# Patient Record
Sex: FEMALE | Race: WHITE | NOT HISPANIC OR LATINO | Employment: OTHER | RURAL
[De-identification: names, ages, dates, MRNs, and addresses within clinical notes are randomized per-mention and may not be internally consistent; named-entity substitution may affect disease eponyms.]

---

## 2017-06-16 ENCOUNTER — OFFICE VISIT (OUTPATIENT)
Dept: PODIATRY | Facility: CLINIC | Age: 62
End: 2017-06-16

## 2017-06-16 VITALS — WEIGHT: 180 LBS | BODY MASS INDEX: 29.99 KG/M2 | HEIGHT: 65 IN

## 2017-06-16 DIAGNOSIS — L84 CORNS: ICD-10-CM

## 2017-06-16 DIAGNOSIS — M79.672 LEFT FOOT PAIN: Primary | ICD-10-CM

## 2017-06-16 DIAGNOSIS — B35.1 ONYCHOMYCOSIS: ICD-10-CM

## 2017-06-16 PROCEDURE — 99203 OFFICE O/P NEW LOW 30 MIN: CPT | Performed by: PODIATRIST

## 2017-06-16 RX ORDER — ALBUTEROL SULFATE 90 UG/1
POWDER, METERED RESPIRATORY (INHALATION)
Refills: 1 | COMMUNITY
Start: 2017-03-17 | End: 2018-04-04

## 2017-06-16 RX ORDER — FEXOFENADINE HYDROCHLORIDE AND PSEUDOEPHEDRINE HYDROCHLORIDE 60; 120 MG/1; MG/1
TABLET, FILM COATED, EXTENDED RELEASE ORAL
Refills: 0 | COMMUNITY
Start: 2017-06-02 | End: 2019-10-15

## 2017-06-16 RX ORDER — FLUTICASONE PROPIONATE 50 MCG
SPRAY, SUSPENSION (ML) NASAL
Refills: 2 | COMMUNITY
Start: 2017-05-11 | End: 2018-08-17

## 2017-06-16 RX ORDER — TIOTROPIUM BROMIDE INHALATION SPRAY 1.56 UG/1
SPRAY, METERED RESPIRATORY (INHALATION)
Refills: 5 | COMMUNITY
Start: 2017-05-23 | End: 2018-04-04

## 2017-06-16 RX ORDER — EPINEPHRINE 0.3 MG/.3ML
INJECTION SUBCUTANEOUS
Refills: 1 | COMMUNITY
Start: 2017-05-05 | End: 2021-08-18 | Stop reason: SDUPTHER

## 2017-06-16 RX ORDER — MONTELUKAST SODIUM 10 MG/1
TABLET ORAL
Refills: 3 | COMMUNITY
Start: 2017-05-11 | End: 2018-04-04

## 2017-06-16 RX ORDER — DEXLANSOPRAZOLE 60 MG/1
CAPSULE, DELAYED RELEASE ORAL
Refills: 12 | COMMUNITY
Start: 2017-05-11 | End: 2018-05-02

## 2017-06-16 RX ORDER — FAMOTIDINE 40 MG/1
TABLET, FILM COATED ORAL
Refills: 5 | COMMUNITY
Start: 2017-05-31 | End: 2018-08-17

## 2017-06-16 RX ORDER — DICYCLOMINE HCL 20 MG
TABLET ORAL
Refills: 4 | COMMUNITY
Start: 2017-05-31 | End: 2018-08-17

## 2017-06-16 NOTE — PROGRESS NOTES
Genna Garcia  1955  62 y.o. female   Patient presents today for callus of the left foot and toenail fungus of the right foot.    6/16/2017  Chief Complaint   Patient presents with   • Right Foot - Nail Problem   • Left Foot - Callouses           History of Present Illness    62-year-old female presents to clinic today with chief complaint of callus to her left foot and nail fungus on the right foot.  The left fourth digit has a callus on the distal tip of it which is very painful.  She describes the pain as constant and sharp especially when shoe gear.  She occasionally uses the nail nippers to trim it which helps a little.  She also complains of discolored thickened right fourth digit toenail which she believes is spreading to her great toenail.  This has been present for greater than one year.  She has tried nothing besides trimming it to relieve the issue.  She denies any injuries to her feet.  She has no other pedal complaints.        No past medical history on file.      No past surgical history on file.      No family history on file.      Social History     Social History   • Marital status:      Spouse name: N/A   • Number of children: N/A   • Years of education: N/A     Occupational History   • Not on file.     Social History Main Topics   • Smoking status: Former Smoker   • Smokeless tobacco: Not on file   • Alcohol use No   • Drug use: Not on file   • Sexual activity: Not on file     Other Topics Concern   • Not on file     Social History Narrative   • No narrative on file         Current Outpatient Prescriptions   Medication Sig Dispense Refill   • ALLEGRA-D ALLERGY & CONGESTION  MG per 12 hr tablet TK 1 T PO BID  0   • BREO ELLIPTA 100-25 MCG/INH aerosol powder  INHALE 1 PUFF PO QD  4   • DEXILANT 60 MG capsule TK 1 C PO QD  12   • dicyclomine (BENTYL) 20 MG tablet TK 1 T PO TID 30 MINUTES BEFORE MEALS  4   • EPINEPHrine (EPIPEN) 0.3 MG/0.3ML solution auto-injector injection INJECT  "INTRAMUSCULARLY AS DIRECTED  1   • famotidine (PEPCID) 40 MG tablet TK 1 T PO QHS  5   • fluticasone (FLONASE) 50 MCG/ACT nasal spray SHAKE LQ AND U 1 SPR IEN BID  2   • montelukast (SINGULAIR) 10 MG tablet TK 1 T PO D  3   • mupirocin (BACTROBAN) 2 % ointment APPLY TO NASAL PASSAGES BID UTD  0   • nystatin (MYCOSTATIN) 049904 UNIT/ML suspension SWISH AND GARGLE 5ML QID  0   • PROAIR RESPICLICK 108 (90 BASE) MCG/ACT inhaler INL 2 PFS PO Q 4 TO 6 H PRF SOB OR WHZ  1   • SPIRIVA RESPIMAT 1.25 MCG/ACT aerosol solution INHALE 2 PUFFS PO QD  5     No current facility-administered medications for this visit.          OBJECTIVE    Ht 65\" (165.1 cm)  Wt 180 lb (81.6 kg)  BMI 29.95 kg/m2      Review of Systems   Constitutional: Negative for chills and fever.   Cardiovascular: Negative for chest pain.   Gastrointestinal: Negative for constipation, diarrhea, nausea and vomiting.   Skin: Negative for wound. callus left foot, discolored thick toe nails right foot  Musculoskeletal: left foot pain      Constitutional: well developed, well nourished    HEENT: Normocephalic and atraumatic, normal hearing    Respiratory: Non labored respirations noted    Cardiovascular:    DP/PT pulses palpable    CFT brisk  to all digits  Skin temp is warm to warm from proximal tibia to distal digits  Pedal hair growth diminished    No erythema or edema noted   Diffuse varicosities noted to bilateral lower extremities    Musculoskeletal:  Muscle strength is 5/5 for all muscle groups tested   ROM of the 1st MTP is full without pain or crepitus  ROM of the MTJ is full without pain or crepitus    ROM of the STJ is full without pain or crepitus    ROM of the ankle joint is full without pain or crepitus    Pain on palpation to the distal left fourth digit  Rectus foot type     Dermatological:   Nails 1-5 are within normal limits for length and thickness  on the left foot, fourth digit nail and hallux nail are thickened, discolored on the right  Skin is " warm, dry and intact    Webspaces 1-4 bilateral are clean, dry and intact.   No subcutaneous nodules or masses noted    No open wounds noted   Hyperkeratotic lesion with central nucleated core noted to the distal tip of the left fourth digit.    Neurological:   Protective sensation intact    Sensation intact to light touch    DTR intact    Psychiatric: A&O x 3 with normal mood and affect. NAD.         Procedures        ASSESSMENT AND PLAN    Genna was seen today for nail problem and callouses.    Diagnoses and all orders for this visit:    Left foot pain    Corns    Onychomycosis    - Comprehensive foot and ankle exam performed  - Educated patient on proper callus care  - Rx for urea 40%  - Diagnoses, prevention and treatment of fungal nails were discussed with the patient including nail biopsy and treatment with oral antifungal versus nail avulsion both temporary and permanent versus regular debridements.  Patient will continue to debride the nail herself.  - All questions were answered and the patient is in agreement with the current treatment plan.  - RTC in 2 months            This document has been electronically signed by Lars Brambila DPM on June 16, 2017 9:27 AM     6/16/2017  9:27 AM    EMR Dragon/Transcription disclaimer:   Much of this encounter note is an electronic transcription/translation of spoken language to printed text. The electronic translation of spoken language may permit erroneous, or at times, nonsensical words or phrases to be inadvertently transcribed; Although I have reviewed the note for such errors, some may still exist.

## 2017-06-22 ENCOUNTER — TELEPHONE (OUTPATIENT)
Dept: PODIATRY | Facility: CLINIC | Age: 62
End: 2017-06-22

## 2017-06-22 NOTE — TELEPHONE ENCOUNTER
CARLOS CALLED AND SAID THAT THE UREA CREAM SCRIPT YOU GAVE TO JOSEMANUEL WITT WAS NOT COVERED UNDER HER INSURANCE.    SAYS SHE SENT SOMETHING ASKING IF THERE WAS A REPLACEMENT/SOMETHING ELSE SHE COULD USE.    HAS NOT HEARD ANYTHING BACK.      THANKS.

## 2018-04-04 ENCOUNTER — OFFICE VISIT (OUTPATIENT)
Dept: OTOLARYNGOLOGY | Facility: CLINIC | Age: 63
End: 2018-04-04

## 2018-04-04 VITALS — TEMPERATURE: 96.7 F | BODY MASS INDEX: 29.09 KG/M2 | WEIGHT: 181 LBS | HEIGHT: 66 IN

## 2018-04-04 DIAGNOSIS — K21.9 LARYNGOPHARYNGEAL REFLUX (LPR): Primary | ICD-10-CM

## 2018-04-04 DIAGNOSIS — J34.3 NASAL TURBINATE HYPERTROPHY: ICD-10-CM

## 2018-04-04 DIAGNOSIS — J34.89 NASAL VALVE STENOSIS: ICD-10-CM

## 2018-04-04 PROCEDURE — 99204 OFFICE O/P NEW MOD 45 MIN: CPT | Performed by: OTOLARYNGOLOGY

## 2018-04-04 RX ORDER — AZELASTINE 1 MG/ML
2 SPRAY, METERED NASAL 2 TIMES DAILY
Qty: 30 ML | Refills: 11 | Status: SHIPPED | OUTPATIENT
Start: 2018-04-04 | End: 2018-07-11

## 2018-04-04 RX ORDER — DIAZEPAM 5 MG/1
5 TABLET ORAL NIGHTLY PRN
COMMUNITY
End: 2018-08-17

## 2018-04-04 RX ORDER — RANITIDINE 300 MG/1
300 TABLET ORAL 2 TIMES DAILY
Qty: 60 TABLET | Refills: 3 | Status: SHIPPED | OUTPATIENT
Start: 2018-04-04 | End: 2018-07-11

## 2018-04-04 NOTE — PROGRESS NOTES
Subjective   Genna Garcia is a 63 y.o. female.   Chief complaint drainage coughing throat irritation symptoms eustachian tube problems  History of Present Illness   A she was referred from her primary physician allergist for chronic drainage congestion coughing throat clearing patient's condition she has significant reflux she's had a CT scan sinuses which is normal except for a deviated septum she is been on immunotherapy without desire results has esophageal reflux is no medication for that is not controlling her symptoms.  She had the an EGD which revealed a hiatal hernia in the past old records were reviewed and his significant reflux findings previously.  She thinks it reflux is causing irritation throat going up in her nose.  Drainage is a concern of hers she's not responded to nasal steroids    The following portions of the patient's history were reviewed and updated as appropriate: allergies, current medications, past family history, past medical history, past social history, past surgical history and problem list.      Genna Garcia reports that she has quit smoking. She has never used smokeless tobacco. She reports that she does not drink alcohol or use drugs.  Patient is not a tobacco user and has been counseled for use of tobacco products    Family History   Problem Relation Age of Onset   • Heart failure Mother    • Thyroid disease Mother          Current Outpatient Prescriptions:   •  ALLEGRA-D ALLERGY & CONGESTION  MG per 12 hr tablet, TK 1 T PO BID, Disp: , Rfl: 0  •  DEXILANT 60 MG capsule, TK 1 C PO QD, Disp: , Rfl: 12  •  diazePAM (VALIUM) 5 MG tablet, Take 5 mg by mouth At Night As Needed for Anxiety., Disp: , Rfl:   •  dicyclomine (BENTYL) 20 MG tablet, TK 1 T PO TID 30 MINUTES BEFORE MEALS, Disp: , Rfl: 4  •  EPINEPHrine (EPIPEN) 0.3 MG/0.3ML solution auto-injector injection, INJECT INTRAMUSCULARLY AS DIRECTED, Disp: , Rfl: 1  •  famotidine (PEPCID) 40 MG tablet, TK 1 T PO QHS,  Disp: , Rfl: 5  •  fluticasone (FLONASE) 50 MCG/ACT nasal spray, SHAKE LQ AND U 1 SPR IEN BID, Disp: , Rfl: 2  •  mupirocin (BACTROBAN) 2 % ointment, APPLY TO NASAL PASSAGES BID UTD, Disp: , Rfl: 0  •  azelastine (ASTELIN) 0.1 % nasal spray, 2 sprays into each nostril 2 (Two) Times a Day. Use in each nostril as directed, Disp: 30 mL, Rfl: 11  •  raNITIdine (ZANTAC) 300 MG tablet, Take 1 tablet by mouth 2 (Two) Times a Day., Disp: 60 tablet, Rfl: 3    Allergies   Allergen Reactions   • Sulfa Antibiotics        Past Medical History:   Diagnosis Date   • GERD (gastroesophageal reflux disease)    • Sinusitis          Review of Systems   Constitutional: Negative.    HENT: Positive for hearing loss.         Hoarseness  choking   Eyes: Negative.    Respiratory: Positive for cough.    Gastrointestinal: Positive for abdominal pain, diarrhea, nausea and vomiting.        Heart burn     Endocrine: Negative.         Hot flashes   Genitourinary: Negative.    Musculoskeletal: Positive for back pain.        Muscle weakness  Leg pain   Skin: Negative.    Allergic/Immunologic: Negative.    Neurological: Positive for headaches.   Hematological: Negative.    Psychiatric/Behavioral: Negative.    All other systems reviewed and are negative.          Objective   Physical Exam   Constitutional: She is oriented to person, place, and time. She appears well-developed and well-nourished.   HENT:   Head: Normocephalic and atraumatic.   Right Ear: Hearing, tympanic membrane, external ear and ear canal normal.   Left Ear: Hearing, tympanic membrane, external ear and ear canal normal.   Nose: Mucosal edema and septal deviation present. No rhinorrhea or nasal deformity. No epistaxis. Right sinus exhibits no maxillary sinus tenderness and no frontal sinus tenderness. Left sinus exhibits no maxillary sinus tenderness and no frontal sinus tenderness.   Mouth/Throat: Uvula is midline, oropharynx is clear and moist and mucous membranes are normal. No  trismus in the jaw. Normal dentition. No oropharyngeal exudate or posterior oropharyngeal edema. No tonsillar exudate.   Eyes: Conjunctivae are normal.   Neck: Normal range of motion. Neck supple. No JVD present. No tracheal deviation present. No thyromegaly present.   Pulmonary/Chest: Effort normal.   Lymphadenopathy:        Head (right side): No submental, no submandibular, no tonsillar, no preauricular, no posterior auricular and no occipital adenopathy present.        Head (left side): No submental, no submandibular, no tonsillar, no preauricular, no posterior auricular and no occipital adenopathy present.     She has no cervical adenopathy.        Right cervical: No superficial cervical, no deep cervical and no posterior cervical adenopathy present.       Left cervical: No superficial cervical, no deep cervical and no posterior cervical adenopathy present.   Neurological: She is alert and oriented to person, place, and time. No cranial nerve deficit.   Skin: Skin is warm.   Psychiatric: She has a normal mood and affect. Her speech is normal and behavior is normal. Thought content normal.   Nursing note and vitals reviewed.        Reviewed report of her CT and chest x-ray showing atelectasis and normal sinuses  Indirecvt laryngoscopy is no mass lesion but evidence of reflux no nodules or polyps seen  Assessment/Plan   Genna was seen today for ear problem, sinus problem and cough.    Diagnoses and all orders for this visit:    Laryngopharyngeal reflux (LPR)    Nasal turbinate hypertrophy    Nasal valve stenosis    Other orders  -     azelastine (ASTELIN) 0.1 % nasal spray; 2 sprays into each nostril 2 (Two) Times a Day. Use in each nostril as directed  -     raNITIdine (ZANTAC) 300 MG tablet; Take 1 tablet by mouth 2 (Two) Times a Day.      Just suggesting reflux medications and adding to it.  I talked her she must limit her diet particularly at night.  She may need to consider seeing a gastroesophageal surgeon  regarding Nissen fundoplication if not improving  Review  the use and side effects or nasal spray and her ranitidine she'll use along with her proton pump inhibitor.  See side effects problem she'll let us know otherwise reassess in about a month to recheck she is to call for questions or problems in interim talked about flexible laryngoscopy if not improving on follow-up

## 2018-04-04 NOTE — PATIENT INSTRUCTIONS

## 2018-05-02 ENCOUNTER — OFFICE VISIT (OUTPATIENT)
Dept: OTOLARYNGOLOGY | Facility: CLINIC | Age: 63
End: 2018-05-02

## 2018-05-02 VITALS — WEIGHT: 178 LBS | HEIGHT: 66 IN | BODY MASS INDEX: 28.61 KG/M2 | TEMPERATURE: 96.4 F

## 2018-05-02 DIAGNOSIS — J34.89 NASAL VALVE STENOSIS: ICD-10-CM

## 2018-05-02 DIAGNOSIS — K21.9 LARYNGOPHARYNGEAL REFLUX (LPR): Primary | ICD-10-CM

## 2018-05-02 DIAGNOSIS — J34.3 NASAL TURBINATE HYPERTROPHY: ICD-10-CM

## 2018-05-02 PROCEDURE — 99213 OFFICE O/P EST LOW 20 MIN: CPT | Performed by: OTOLARYNGOLOGY

## 2018-05-02 RX ORDER — TIOTROPIUM BROMIDE INHALATION SPRAY 1.56 UG/1
SPRAY, METERED RESPIRATORY (INHALATION)
Refills: 5 | COMMUNITY
Start: 2018-04-10 | End: 2018-08-17

## 2018-05-02 RX ORDER — ACETAMINOPHEN AND CODEINE PHOSPHATE 300; 30 MG/1; MG/1
TABLET ORAL
Refills: 0 | COMMUNITY
Start: 2018-04-26 | End: 2018-08-17 | Stop reason: SDUPTHER

## 2018-05-02 NOTE — PROGRESS NOTES
Subjective   Genna Garcia is a 63 y.o. female.   Chief complaint follow-up LPR, nasal obstruction    History of Present Illness   A she thinks most her symptoms related to her nasal obstruction that she does have some rhinitis symptoms she continues see Dr. Sampson 4.  She is using her spray is like she is more open than she was still has trouble especially when front of whether come through.  I have any facial swelling fever chills says she's swallowing better not is most throat irritation.  Her voice is been good she's not choking  Recently on abx Augmentin, nasal spray helping and LPR    The following portions of the patient's history were reviewed and updated as appropriate: allergies, current medications, past family history, past medical history, past social history, past surgical history and problem list.      Current Outpatient Prescriptions:   •  acetaminophen-codeine (TYLENOL #3) 300-30 MG per tablet, TK 1 T PO Q 6 H PRF PAIN, Disp: , Rfl: 0  •  ALLEGRA-D ALLERGY & CONGESTION  MG per 12 hr tablet, TK 1 T PO BID, Disp: , Rfl: 0  •  azelastine (ASTELIN) 0.1 % nasal spray, 2 sprays into each nostril 2 (Two) Times a Day. Use in each nostril as directed, Disp: 30 mL, Rfl: 11  •  diazePAM (VALIUM) 5 MG tablet, Take 5 mg by mouth At Night As Needed for Anxiety., Disp: , Rfl:   •  dicyclomine (BENTYL) 20 MG tablet, TK 1 T PO TID 30 MINUTES BEFORE MEALS, Disp: , Rfl: 4  •  EPINEPHrine (EPIPEN) 0.3 MG/0.3ML solution auto-injector injection, INJECT INTRAMUSCULARLY AS DIRECTED, Disp: , Rfl: 1  •  famotidine (PEPCID) 40 MG tablet, TK 1 T PO QHS, Disp: , Rfl: 5  •  fluticasone (FLONASE) 50 MCG/ACT nasal spray, SHAKE LQ AND U 1 SPR IEN BID, Disp: , Rfl: 2  •  mupirocin (BACTROBAN) 2 % ointment, APPLY TO NASAL PASSAGES BID UTD, Disp: , Rfl: 0  •  raNITIdine (ZANTAC) 300 MG tablet, Take 1 tablet by mouth 2 (Two) Times a Day., Disp: 60 tablet, Rfl: 3  •  SPIRIVA RESPIMAT 1.25 MCG/ACT aerosol solution inhaler,  INHALE 2 PUFFS PO QD, Disp: , Rfl: 5    Allergies   Allergen Reactions   • Augmentin [Amoxicillin-Pot Clavulanate] Diarrhea   • Sulfa Antibiotics              Review of Systems   Constitutional: Negative for fever.   HENT: Negative for facial swelling and voice change.    Respiratory: Negative for choking.    Hematological: Negative for adenopathy.           Objective   Physical Exam   Constitutional: She is oriented to person, place, and time. She appears well-developed and well-nourished.   HENT:   Head: Normocephalic and atraumatic.   Right Ear: Hearing, tympanic membrane, external ear and ear canal normal.   Left Ear: Hearing, tympanic membrane, external ear and ear canal normal.   Nose: Nasal deformity and septal deviation present. No mucosal edema or rhinorrhea. No epistaxis. Right sinus exhibits no maxillary sinus tenderness and no frontal sinus tenderness. Left sinus exhibits no maxillary sinus tenderness and no frontal sinus tenderness.       Mouth/Throat: Uvula is midline and oropharynx is clear and moist. No trismus in the jaw. Normal dentition. No oropharyngeal exudate or posterior oropharyngeal edema.   Eyes: Conjunctivae are normal.   Neck: Normal range of motion. Neck supple. No JVD present. No tracheal deviation present. No thyromegaly present.   Pulmonary/Chest: Effort normal.   Musculoskeletal: Normal range of motion.   Lymphadenopathy:        Head (right side): No submental, no submandibular, no tonsillar, no preauricular, no posterior auricular and no occipital adenopathy present.        Head (left side): No submental, no submandibular, no tonsillar, no preauricular, no posterior auricular and no occipital adenopathy present.     She has no cervical adenopathy.        Right cervical: No superficial cervical, no deep cervical and no posterior cervical adenopathy present.       Left cervical: No superficial cervical, no deep cervical and no posterior cervical adenopathy present.   Neurological: She  is alert and oriented to person, place, and time. No cranial nerve deficit.   Skin: Skin is warm.   Psychiatric: She has a normal mood and affect. Her speech is normal and behavior is normal. Thought content normal.   Nursing note and vitals reviewed.          Assessment/Plan   Genna was seen today for follow-up.    Diagnoses and all orders for this visit:    Laryngopharyngeal reflux (LPR)    Nasal turbinate hypertrophy    Nasal valve stenosis    Discussed in detail nasal valve repair  Continue medications   Discussed risk and benefits medications she has probably refills for did not need to prescribe her today that we discussed this in detail.    Lengthy discussion about nasal surgery was involved the risks benefits and what can be expected would not be expected tolerated can only (he wouldn't history help with allergy symptoms and there is a 90% success rate with helping breathing we discussed risk change in appearance bleeding infection scarring she is leaning towards surgical weight until she thinks about this further we'll see her back in about 6 weeks then discuss further

## 2018-05-02 NOTE — PATIENT INSTRUCTIONS

## 2018-07-11 ENCOUNTER — OFFICE VISIT (OUTPATIENT)
Dept: OTOLARYNGOLOGY | Facility: CLINIC | Age: 63
End: 2018-07-11

## 2018-07-11 VITALS — TEMPERATURE: 97.6 F | WEIGHT: 180 LBS | BODY MASS INDEX: 28.93 KG/M2 | HEIGHT: 66 IN

## 2018-07-11 DIAGNOSIS — J34.3 NASAL TURBINATE HYPERTROPHY: ICD-10-CM

## 2018-07-11 DIAGNOSIS — J34.2 NASAL SEPTAL DEVIATION: ICD-10-CM

## 2018-07-11 DIAGNOSIS — K21.9 LARYNGOPHARYNGEAL REFLUX (LPR): Primary | ICD-10-CM

## 2018-07-11 DIAGNOSIS — J34.89 NASAL VALVE BLOCKAGE: ICD-10-CM

## 2018-07-11 PROCEDURE — 99213 OFFICE O/P EST LOW 20 MIN: CPT | Performed by: OTOLARYNGOLOGY

## 2018-07-11 RX ORDER — OLOPATADINE HYDROCHLORIDE 665 UG/1
2 SPRAY NASAL
Qty: 30 G | Refills: 11 | Status: CANCELLED | OUTPATIENT
Start: 2018-07-11

## 2018-07-11 RX ORDER — OLOPATADINE HYDROCHLORIDE 665 UG/1
2 SPRAY NASAL 2 TIMES DAILY
Qty: 30.5 G | Refills: 6 | Status: SHIPPED | OUTPATIENT
Start: 2018-07-11 | End: 2020-09-08 | Stop reason: SDUPTHER

## 2018-07-11 NOTE — PATIENT INSTRUCTIONS

## 2018-07-11 NOTE — PROGRESS NOTES
Subjective   Genna Garcia is a 63 y.o. female.   Follow-up LPR    History of Present Illness      The patient has a history of LPR seeing and gastroenterologist in [place] whose done a dilation.  He's treating her with a different proton pump inhibitors after Zantac.  She is doing better with the nasal spray having less drainage and less congestion that still a problem for her.  She's concerned about the taste.  She also has questions about nasal surgery and success rate        The following portions of the patient's history were reviewed and updated as appropriate: allergies, current medications, past family history, past medical history, past social history, past surgical history and problem list.      Current Outpatient Prescriptions:   •  acetaminophen-codeine (TYLENOL #3) 300-30 MG per tablet, TK 1 T PO Q 6 H PRF PAIN, Disp: , Rfl: 0  •  ALLEGRA-D ALLERGY & CONGESTION  MG per 12 hr tablet, TK 1 T PO BID, Disp: , Rfl: 0  •  azelastine (ASTELIN) 0.1 % nasal spray, 2 sprays into each nostril 2 (Two) Times a Day. Use in each nostril as directed, Disp: 30 mL, Rfl: 11  •  diazePAM (VALIUM) 5 MG tablet, Take 5 mg by mouth At Night As Needed for Anxiety., Disp: , Rfl:   •  dicyclomine (BENTYL) 20 MG tablet, TK 1 T PO TID 30 MINUTES BEFORE MEALS, Disp: , Rfl: 4  •  EPINEPHrine (EPIPEN) 0.3 MG/0.3ML solution auto-injector injection, INJECT INTRAMUSCULARLY AS DIRECTED, Disp: , Rfl: 1  •  famotidine (PEPCID) 40 MG tablet, TK 1 T PO QHS, Disp: , Rfl: 5  •  fluticasone (FLONASE) 50 MCG/ACT nasal spray, SHAKE LQ AND U 1 SPR IEN BID, Disp: , Rfl: 2  •  mupirocin (BACTROBAN) 2 % ointment, APPLY TO NASAL PASSAGES BID UTD, Disp: , Rfl: 0  •  SPIRIVA RESPIMAT 1.25 MCG/ACT aerosol solution inhaler, INHALE 2 PUFFS PO QD, Disp: , Rfl: 5    Allergies   Allergen Reactions   • Augmentin [Amoxicillin-Pot Clavulanate] Diarrhea   • Sulfa Antibiotics              Review of Systems   Constitutional: Negative for fever.   HENT:  Positive for congestion, postnasal drip and rhinorrhea. Negative for facial swelling.    Hematological: Negative for adenopathy.           Objective   Physical Exam   Constitutional: She is oriented to person, place, and time. She appears well-developed and well-nourished.   HENT:   Head: Normocephalic and atraumatic.   Right Ear: Hearing, tympanic membrane, external ear and ear canal normal.   Left Ear: Hearing, tympanic membrane, external ear and ear canal normal.   Nose: Nasal deformity and septal deviation present. No mucosal edema or rhinorrhea. No epistaxis. Right sinus exhibits no maxillary sinus tenderness and no frontal sinus tenderness. Left sinus exhibits no maxillary sinus tenderness and no frontal sinus tenderness.       Mouth/Throat: Uvula is midline and oropharynx is clear and moist. No trismus in the jaw. Normal dentition. No oropharyngeal exudate or posterior oropharyngeal edema.   Eyes: Conjunctivae are normal.   Neck: Normal range of motion. Neck supple. No JVD present. No tracheal deviation present. No thyromegaly present.   Pulmonary/Chest: Effort normal.   Musculoskeletal: Normal range of motion.   Lymphadenopathy:        Head (right side): No submental, no submandibular, no tonsillar, no preauricular, no posterior auricular and no occipital adenopathy present.        Head (left side): No submental, no submandibular, no tonsillar, no preauricular, no posterior auricular and no occipital adenopathy present.     She has no cervical adenopathy.        Right cervical: No superficial cervical, no deep cervical and no posterior cervical adenopathy present.       Left cervical: No superficial cervical, no deep cervical and no posterior cervical adenopathy present.   Neurological: She is alert and oriented to person, place, and time. No cranial nerve deficit.   Skin: Skin is warm.   Psychiatric: She has a normal mood and affect. Her speech is normal and behavior is normal. Thought content normal.    Nursing note and vitals reviewed.          Assessment/Plan   Genna was seen today for follow-up.    Diagnoses and all orders for this visit:    Laryngopharyngeal reflux (LPR)    Nasal turbinate hypertrophy    Nasal valve blockage    Nasal septal deviation    Other orders  -     olopatadine (PATANASE) 0.6 % solution nasal solution; 2 sprays by Each Nare route.        Which her nasal spray to 1 hopefully doesn't cause too much more but helps her symptoms and taste better.  Reviewed surgical issues in detail the risks benefits she's got think about that.  I gave her a list of names operations considered including nasal valve surgery septal surgery and turbinate surgery.  Discussed possible nasal valve surgery in the office I told her she may not be able to tolerate it.     see her back in 6 months and she has a problem or questions she'll have the GI physician treated her from GI standpoint for LPR

## 2018-08-17 ENCOUNTER — OFFICE VISIT (OUTPATIENT)
Dept: FAMILY MEDICINE CLINIC | Facility: CLINIC | Age: 63
End: 2018-08-17

## 2018-08-17 VITALS
RESPIRATION RATE: 16 BRPM | DIASTOLIC BLOOD PRESSURE: 77 MMHG | WEIGHT: 183 LBS | HEIGHT: 66 IN | TEMPERATURE: 97.5 F | OXYGEN SATURATION: 98 % | HEART RATE: 79 BPM | BODY MASS INDEX: 29.41 KG/M2 | SYSTOLIC BLOOD PRESSURE: 132 MMHG

## 2018-08-17 DIAGNOSIS — G89.29 CHRONIC LOW BACK PAIN, UNSPECIFIED BACK PAIN LATERALITY, WITH SCIATICA PRESENCE UNSPECIFIED: ICD-10-CM

## 2018-08-17 DIAGNOSIS — G89.29 CHRONIC THORACIC BACK PAIN, UNSPECIFIED BACK PAIN LATERALITY: ICD-10-CM

## 2018-08-17 DIAGNOSIS — M54.5 CHRONIC LOW BACK PAIN, UNSPECIFIED BACK PAIN LATERALITY, WITH SCIATICA PRESENCE UNSPECIFIED: ICD-10-CM

## 2018-08-17 DIAGNOSIS — Z13.820 SCREENING FOR OSTEOPOROSIS: ICD-10-CM

## 2018-08-17 DIAGNOSIS — M54.6 CHRONIC THORACIC BACK PAIN, UNSPECIFIED BACK PAIN LATERALITY: ICD-10-CM

## 2018-08-17 DIAGNOSIS — M41.9 SCOLIOSIS OF THORACOLUMBAR SPINE, UNSPECIFIED SCOLIOSIS TYPE: Primary | ICD-10-CM

## 2018-08-17 PROCEDURE — 99214 OFFICE O/P EST MOD 30 MIN: CPT | Performed by: NURSE PRACTITIONER

## 2018-08-17 RX ORDER — DEXLANSOPRAZOLE 60 MG/1
60 CAPSULE, DELAYED RELEASE ORAL
Refills: 6 | COMMUNITY
Start: 2018-07-24 | End: 2019-06-26 | Stop reason: ALTCHOICE

## 2018-08-17 RX ORDER — METHOCARBAMOL 500 MG/1
500 TABLET, FILM COATED ORAL 3 TIMES DAILY PRN
Qty: 60 TABLET | Refills: 3 | Status: SHIPPED | OUTPATIENT
Start: 2018-08-17 | End: 2019-01-09

## 2018-08-17 RX ORDER — ACETAMINOPHEN AND CODEINE PHOSPHATE 300; 30 MG/1; MG/1
1 TABLET ORAL 2 TIMES DAILY
Qty: 60 TABLET | Refills: 0 | Status: SHIPPED | OUTPATIENT
Start: 2018-08-17 | End: 2019-06-26 | Stop reason: SDUPTHER

## 2018-08-20 NOTE — PROGRESS NOTES
Subjective   Genna Garcia is a 63 y.o. female.     Here today to establish;  She has chronic back pain.  She reports she might have scoliosis but has not seen a specialist about this.  She is willing to see a neurosurgeon.  She also wants a dexa scan done.  Has been told in the past that she might have osteoporosis.      Back Pain   This is a chronic problem. The current episode started more than 1 year ago. The problem occurs constantly. The problem has been gradually worsening since onset. The pain is present in the lumbar spine and thoracic spine. The pain does not radiate. The pain is at a severity of 8/10. The pain is severe. The pain is the same all the time. The symptoms are aggravated by bending, standing, stress and position. Stiffness is present all day. Pertinent negatives include no abdominal pain, bladder incontinence, bowel incontinence, chest pain, dysuria, fever, headaches, leg pain, numbness, paresis, paresthesias, pelvic pain, perianal numbness, tingling, weakness or weight loss. Risk factors include menopause and sedentary lifestyle. She has tried analgesics (cant take nsaids due to reflux) for the symptoms. The treatment provided mild relief.        The following portions of the patient's history were reviewed and updated as appropriate: allergies, current medications, past family history, past medical history, past social history, past surgical history and problem list.    Review of Systems   Constitutional: Negative.  Negative for fever and unexpected weight loss.   HENT: Negative.    Respiratory: Negative.    Cardiovascular: Negative.  Negative for chest pain.   Gastrointestinal: Negative for abdominal pain and bowel incontinence.   Genitourinary: Negative for urinary incontinence, dysuria and pelvic pain.   Musculoskeletal: Positive for back pain.   Skin: Negative.    Neurological: Negative.  Negative for tingling, weakness, numbness and paresthesias.       Objective   Physical Exam    Constitutional: She is oriented to person, place, and time. She appears well-developed and well-nourished. No distress.   HENT:   Head: Normocephalic.   Eyes: Pupils are equal, round, and reactive to light.   Neck: Normal range of motion. Neck supple. No thyromegaly present.   Cardiovascular: Normal rate, regular rhythm and normal heart sounds.  Exam reveals no friction rub.    No murmur heard.  Pulmonary/Chest: Effort normal and breath sounds normal. No respiratory distress. She has no wheezes. She has no rales.   Abdominal: Soft.   Musculoskeletal:        Thoracic back: She exhibits pain and spasm.        Lumbar back: She exhibits decreased range of motion, tenderness, pain and spasm.   X ray is reviewed and shows degenerative changes along with mod severe left sided scoliosis.   Neurological: She is alert and oriented to person, place, and time.   Skin: Skin is warm and dry.   Psychiatric: She has a normal mood and affect.   Nursing note and vitals reviewed.        Assessment/Plan   Genna was seen today for back pain.    Diagnoses and all orders for this visit:    Chronic low back pain, unspecified back pain laterality, with sciatica presence unspecified  -     XR Spine Lumbar 4+ View (In Office)    Chronic thoracic back pain, unspecified back pain laterality  -     XR Spine Thoracic 4+ View (In Office)    Scoliosis of thoracolumbar spine, unspecified scoliosis type  -     acetaminophen-codeine (TYLENOL #3) 300-30 MG per tablet; Take 1 tablet by mouth 2 (Two) Times a Day.  -     methocarbamol (ROBAXIN) 500 MG tablet; Take 1 tablet by mouth 3 (Three) Times a Day As Needed for Muscle Spasms. For muscle spasms    huber report # [id number] is reviewed.

## 2018-08-21 ENCOUNTER — TELEPHONE (OUTPATIENT)
Dept: FAMILY MEDICINE CLINIC | Facility: CLINIC | Age: 63
End: 2018-08-21

## 2018-08-28 ENCOUNTER — TELEPHONE (OUTPATIENT)
Dept: FAMILY MEDICINE CLINIC | Facility: CLINIC | Age: 63
End: 2018-08-28

## 2018-08-28 DIAGNOSIS — M81.0 OSTEOPOROSIS, UNSPECIFIED OSTEOPOROSIS TYPE, UNSPECIFIED PATHOLOGICAL FRACTURE PRESENCE: Primary | ICD-10-CM

## 2018-08-28 RX ORDER — ALENDRONATE SODIUM 70 MG/1
70 TABLET ORAL
Qty: 12 TABLET | Refills: 1 | Status: SHIPPED | OUTPATIENT
Start: 2018-08-28 | End: 2018-10-18 | Stop reason: SDUPTHER

## 2018-08-30 ENCOUNTER — HOSPITAL ENCOUNTER (OUTPATIENT)
Dept: PHYSICAL THERAPY | Facility: HOSPITAL | Age: 63
Setting detail: THERAPIES SERIES
Discharge: HOME | End: 2018-08-30

## 2018-08-30 DIAGNOSIS — M41.9 SCOLIOSIS OF THORACOLUMBAR SPINE, UNSPECIFIED SCOLIOSIS TYPE: Primary | ICD-10-CM

## 2018-08-30 PROCEDURE — 97162 PT EVAL MOD COMPLEX 30 MIN: CPT | Performed by: PHYSICAL THERAPIST

## 2018-08-30 PROCEDURE — 97110 THERAPEUTIC EXERCISES: CPT | Performed by: PHYSICAL THERAPIST

## 2018-09-05 ENCOUNTER — HOSPITAL ENCOUNTER (OUTPATIENT)
Dept: PHYSICAL THERAPY | Facility: HOSPITAL | Age: 63
Setting detail: THERAPIES SERIES
Discharge: HOME | End: 2018-09-05

## 2018-09-05 DIAGNOSIS — M41.9 SCOLIOSIS OF THORACOLUMBAR SPINE, UNSPECIFIED SCOLIOSIS TYPE: Primary | ICD-10-CM

## 2018-09-05 PROCEDURE — 97110 THERAPEUTIC EXERCISES: CPT | Performed by: PHYSICAL THERAPIST

## 2018-09-05 NOTE — THERAPY TREATMENT NOTE
Outpatient Physical Therapy Ortho Treatment Note  [facility]     Patient Name: Genna Garcia  : 1955  MRN: [id number]  Today's Date: 2018      Visit Date: 2018  Visit 2/  Return to MD: YI  Re-cert date: 18  % improvement: 0%    Visit Dx:    ICD-10-CM ICD-9-CM   1. Scoliosis of thoracolumbar spine, unspecified scoliosis type M41.9 737.30       There is no problem list on file for this patient.       Past Medical History:   Diagnosis Date   • Elevated cholesterol    • Endometriosis    • Falls    • GERD (gastroesophageal reflux disease)    • Osteoporosis    • Scoliosis of thoracolumbar spine    • Sinusitis         Past Surgical History:   Procedure Laterality Date   • APPENDECTOMY     • FOREARM SURGERY     • HYSTERECTOMY     • SALPINGO OOPHORECTOMY     • WRIST FRACTURE SURGERY Right              PT Ortho     Row Name 18 1700       Subjective Comments    Subjective Comments pt reports doing HEP 2x/day  -BS       Precautions and Contraindications    Precautions/Limitations no known precautions/limitations  -BS       Subjective Pain    Able to rate subjective pain? yes  -BS    Pre-Treatment Pain Level 0  -BS      User Key  (r) = Recorded By, (t) = Taken By, (c) = Cosigned By    Initials Name Provider Type    Krunal Patel, PT Physical Therapist                            PT Assessment/Plan     Row Name 18 170          PT Assessment    Assessment Comments pt with reduced LBP with RFIL. Limited by B hip flex and core trunk weakness.  -BS        PT Plan    PT Frequency 2x/week  -BS     PT Plan Comments continue w/ core trunk stability  -BS       User Key  (r) = Recorded By, (t) = Taken By, (c) = Cosigned By    Initials Name Provider Type    Krunal Patel, PT Physical Therapist                Modalities     Row Name 18 170             Subjective Pain    Post-Treatment Pain Level 0  -BS         Moist Heat    MH Applied Yes  -BS      Location low  "back   -BS      Rx Minutes 10 mins  -BS      MH S/P Rx Yes  -BS        User Key  (r) = Recorded By, (t) = Taken By, (c) = Cosigned By    Initials Name Provider Type    Krunal Patel, PT Physical Therapist                Exercises     Row Name 09/05/18 1700             Subjective Comments    Subjective Comments pt reports doing HEP 2x/day  -BS         Subjective Pain    Able to rate subjective pain? yes  -BS      Pre-Treatment Pain Level 0  -BS      Post-Treatment Pain Level 0  -BS         Exercise 1    Exercise Name 1 LTR  -BS      Sets 1 1  -BS      Reps 1 10  -BS      Time 1 5\"  -BS         Exercise 2    Exercise Name 2 B SLR  -BS      Sets 2 2  -BS      Reps 2 10  -BS         Exercise 3    Exercise Name 3 B standing HS stretch  -BS      Sets 3 1  -BS      Reps 3 1  -BS      Time 3 d/c'd due to increased post thigh burning  -BS         Exercise 4    Exercise Name 4 RFIL  -BS      Sets 4 1  -BS      Reps 4 15  -BS         Exercise 5    Exercise Name 5 RFIS  -BS      Sets 5 1  -BS      Reps 5 10  -BS        User Key  (r) = Recorded By, (t) = Taken By, (c) = Cosigned By    Initials Name Provider Type    Krunal Patel, PT Physical Therapist                               PT OP Goals     Row Name 09/05/18 1600          PT Short Term Goals    STG Date to Achieve 09/13/18  -BS     STG 1 Pt indep with HEP  -BS     STG 1 Progress Ongoing  -BS     STG 2 Improve lumbar spine rotation AROM bilat to minimal 75% limit   -BS     STG 2 Progress Ongoing  -BS     STG 3 Reduce LBP with prolonged standing and walking by 25%  -BS     STG 3 Progress Ongoing  -BS     STG 4 Improve B hip flex/B knee ext MMT to 4+/5  -BS     STG 4 Progress Ongoing  -BS        Long Term Goals    LTG Date to Achieve 09/27/18  -BS     LTG 1 Reduce LBP with prolonged standing and walking by 50%  -BS     LTG 1 Progress Ongoing  -BS     LTG 2 Reduce Modified Oswestry score to 16 or less  -BS     LTG 2 Progress Ongoing  -BS        Time Calculation    PT " Goal Re-Cert Due Date 09/20/18  -ZENAIDA       User Key  (r) = Recorded By, (t) = Taken By, (c) = Cosigned By    Initials Name Provider Type    Krunal Patel, PT Physical Therapist          Therapy Education  Given: HEP, Symptoms/condition management  Program: Reinforced  How Provided: Verbal  Provided to: Patient              Time Calculation:   Start Time: 1650  Stop Time: 1745  Time Calculation (min): 55 min  PT Non-Billable Time (min): 10 min  Total Timed Code Minutes- PT: 45 minute(s)  Therapy Suggested Charges     Code   Minutes Charges    None           Therapy Charges for Today     Code Description Service Date Service Provider Modifiers Qty    94664994327 HC PT THER PROC EA 15 MIN 9/5/2018 Krunal Wynn, PT GP 3    74376654548 HC PT THER SUPP EA 15 MIN 9/5/2018 Krunal Wynn, PT GP 1                    Krunal Wynn, PT  9/5/2018

## 2018-09-10 ENCOUNTER — HOSPITAL ENCOUNTER (OUTPATIENT)
Dept: PHYSICAL THERAPY | Facility: HOSPITAL | Age: 63
Setting detail: THERAPIES SERIES
Discharge: HOME | End: 2018-09-10

## 2018-09-10 DIAGNOSIS — M41.9 SCOLIOSIS OF THORACOLUMBAR SPINE, UNSPECIFIED SCOLIOSIS TYPE: Primary | ICD-10-CM

## 2018-09-10 PROCEDURE — 97110 THERAPEUTIC EXERCISES: CPT | Performed by: PHYSICAL THERAPIST

## 2018-09-10 NOTE — THERAPY TREATMENT NOTE
"    Outpatient Physical Therapy Ortho Treatment Note  [facility]     Patient Name: Genna Garcia  : 1955  MRN: [id number]  Today's Date: 9/10/2018      Visit Date: 09/10/2018  Visit 3/3  Return to MD: YI  Re-cert date: 18  % improvement: \"better\"%  Visit Dx:    ICD-10-CM ICD-9-CM   1. Scoliosis of thoracolumbar spine, unspecified scoliosis type M41.9 737.30       There is no problem list on file for this patient.       Past Medical History:   Diagnosis Date   • Elevated cholesterol    • Endometriosis    • Falls    • GERD (gastroesophageal reflux disease)    • Osteoporosis    • Scoliosis of thoracolumbar spine    • Sinusitis         Past Surgical History:   Procedure Laterality Date   • APPENDECTOMY     • FOREARM SURGERY     • HYSTERECTOMY     • SALPINGO OOPHORECTOMY     • WRIST FRACTURE SURGERY Right              PT Ortho     Row Name 09/10/18 1700       Subjective Comments    Subjective Comments pt reports aggravated back 2 days ago and felt tremendous relief doing HEP of RFIS to alleviate her symptoms.  -BS       Precautions and Contraindications    Precautions/Limitations no known precautions/limitations  -BS       Subjective Pain    Able to rate subjective pain? yes  -BS       Myotomal Screen- Lower Quarter Clearing    Hip flexion (L2) Right:;4- (Good -);Left:;4 (Good)  -BS    Knee extension (L3) Right:;4 (Good);Left:;4+ (Good +)  -BS      User Key  (r) = Recorded By, (t) = Taken By, (c) = Cosigned By    Initials Name Provider Type    BS Krunal Wynn, PT Physical Therapist                                Modalities     Row Name 09/10/18 170             Subjective Pain    Post-Treatment Pain Level 0  -BS        User Key  (r) = Recorded By, (t) = Taken By, (c) = Cosigned By    Initials Name Provider Type    Krunal Patel, PT Physical Therapist                Exercises     Row Name 09/10/18 1700             Subjective Comments    Subjective Comments pt reports aggravated " "back 2 days ago and felt tremendous relief doing HEP of RFIS to alleviate her symptoms.  -BS         Subjective Pain    Able to rate subjective pain? yes  -BS      Pre-Treatment Pain Level 0  -BS      Post-Treatment Pain Level 0  -BS         Exercise 1    Exercise Name 1 LTR  -BS      Sets 1 1  -BS      Reps 1 10  -BS      Time 1 5\"  -BS         Exercise 2    Exercise Name 2 B SLR  -BS      Sets 2 2  -BS      Reps 2 10  -BS         Exercise 3    Exercise Name 3 seated B HS stretch  -BS      Reps 3 2  -BS      Time 3 30\" hold ea  -BS         Exercise 4    Exercise Name 4 RFISitting  -BS      Sets 4 1  -BS      Reps 4 10  -BS         Exercise 5    Exercise Name 5 RFIS  -BS      Sets 5 1  -BS      Reps 5 10  -BS         Exercise 6    Exercise Name 6 Pro II, level 3  -BS      Time 6 10 minutes  -BS         Exercise 7    Exercise Name 7 supine B HS stretch  -BS      Reps 7 2  -BS      Time 7 30\" hold ea  -BS      Additional Comments d/c'd due to increased acid reflux  -BS         Exercise 8    Exercise Name 8 pec stretch at doorway  -BS      Reps 8 2  -BS      Time 8 30\" hold  -BS         Exercise 9    Exercise Name 9 seated thoracic ext w/ self mob  -BS      Sets 9 1  -BS      Reps 9 10  -BS        User Key  (r) = Recorded By, (t) = Taken By, (c) = Cosigned By    Initials Name Provider Type    Krunal Patel, PT Physical Therapist                               PT OP Goals     Row Name 09/10/18 1700          Time Calculation    PT Goal Re-Cert Due Date 09/20/18  -BS       User Key  (r) = Recorded By, (t) = Taken By, (c) = Cosigned By    Initials Name Provider Type    Krunal Patel, PT Physical Therapist                         Time Calculation:   Start Time: 1652  Stop Time: 1739  Time Calculation (min): 47 min  Total Timed Code Minutes- PT: 47 minute(s)  Therapy Suggested Charges     Code   Minutes Charges    None           Therapy Charges for Today     Code Description Service Date Service Provider Modifiers Qty "    97686531791  PT THER PROC EA 15 MIN 9/10/2018 Krunal Wynn, PT GP 3                    Krunal Wynn, PT  9/10/2018

## 2018-09-13 ENCOUNTER — HOSPITAL ENCOUNTER (OUTPATIENT)
Dept: PHYSICAL THERAPY | Facility: HOSPITAL | Age: 63
Setting detail: THERAPIES SERIES
Discharge: HOME | End: 2018-09-13

## 2018-09-13 DIAGNOSIS — M41.9 SCOLIOSIS OF THORACOLUMBAR SPINE, UNSPECIFIED SCOLIOSIS TYPE: Primary | ICD-10-CM

## 2018-09-13 PROCEDURE — 97110 THERAPEUTIC EXERCISES: CPT

## 2018-09-13 NOTE — THERAPY TREATMENT NOTE
Outpatient Physical Therapy Ortho Treatment Note  [facility]  Ilda Bledsoe PTA       Patient Name: Genna Garcia  : 1955  MRN: [id number]  Today's Date: 2018      Visit Date: 2018     Visits: 4/4  Insurance Visits Approved: 20 visits  Recert Due: 2018  MD Appt: TBD  Pain: pretreatment 0/10; post treatment 0/10  Improvement: pt is subjectively reporting does not rate% improvement since initial evaluation    Visit Dx:    ICD-10-CM ICD-9-CM   1. Scoliosis of thoracolumbar spine, unspecified scoliosis type M41.9 737.30       There is no problem list on file for this patient.       Past Medical History:   Diagnosis Date   • Elevated cholesterol    • Endometriosis    • Falls    • GERD (gastroesophageal reflux disease)    • Osteoporosis    • Scoliosis of thoracolumbar spine    • Sinusitis         Past Surgical History:   Procedure Laterality Date   • APPENDECTOMY     • FOREARM SURGERY     • HYSTERECTOMY     • SALPINGO OOPHORECTOMY     • WRIST FRACTURE SURGERY Right              PT Ortho     Row Name 18 1600       Subjective Comments    Subjective Comments patient reports that she will be glad to retire so that she can be up moving more. her work keeps her confined to a desk often.   -       Precautions and Contraindications    Precautions/Limitations no known precautions/limitations  -    Precautions pt cannot lay flat secondary to reflux issues; elevate at least 30 degrees  -       Subjective Pain    Able to rate subjective pain? yes  -    Pre-Treatment Pain Level 0  -    Post-Treatment Pain Level 0  -      User Key  (r) = Recorded By, (t) = Taken By, (c) = Cosigned By    Initials Name Provider Type     Ilda Bledsoe PTA Physical Therapy Assistant                            PT Assessment/Plan     Row Name 18 1600          PT Assessment    Assessment Comments patient tolerates all therex well this treatment with no increase in complaints. is  able to complete a standing hamstring stretch with no complaints when given good cues for appropriate technique.   -        PT Plan    PT Frequency 2x/week  -     PT Plan Comments continue per POC add hip abd with tband resist  -       User Key  (r) = Recorded By, (t) = Taken By, (c) = Cosigned By    Initials Name Provider Type     Ilda Bledsoe PTA Physical Therapy Assistant                Modalities     Row Name 09/13/18 1600             Moist Heat    MH Applied No   pt defers  -        User Key  (r) = Recorded By, (t) = Taken By, (c) = Cosigned By    Initials Name Provider Type     Ilda Bledsoe PTA Physical Therapy Assistant                Exercises     Row Name 09/13/18 1600             Subjective Comments    Subjective Comments patient reports that she will be glad to retire so that she can be up moving more. her work keeps her confined to a desk often.   -         Subjective Pain    Able to rate subjective pain? yes  -      Pre-Treatment Pain Level 0  -      Post-Treatment Pain Level 0  -         Exercise 1    Exercise Name 1 Pro II LE's  -      Time 1 10 minutes  -      Additional Comments L 4.0  -MH         Exercise 2    Exercise Name 2 B St. HS S  -MH      Reps 2 2  -MH      Time 2 30 sec hold  -MH         Exercise 3    Exercise Name 3 instruction for long sitting HS S  -MH         Exercise 4    Exercise Name 4 B Sitting Piriformis S  -MH      Reps 4 2  -MH      Time 4 30 sec hold  -MH         Exercise 5    Exercise Name 5 B Sitting QL S  -MH      Reps 5 10  -MH         Exercise 6    Exercise Name 6 Right Sidelying Bolster S  -MH      Time 6 2 minutes  -MH         Exercise 7    Exercise Name 7 LTR with HOB elevated with red wedge and pillows  -      Reps 7 10  -MH      Time 7 10 sec hold  -MH         Exercise 8    Exercise Name 8 Bridges  -      Sets 8 2  -MH      Reps 8 10  -MH      Time 8 5 sec hold  -MH         Exercise 9    Exercise Name 9 BKLL  -MH      Reps 9 20  -MH       Time 9 5 sec hold  -        User Key  (r) = Recorded By, (t) = Taken By, (c) = Cosigned By    Initials Name Provider Type     Ilda Bledsoe PTA Physical Therapy Assistant                               PT OP Goals     Row Name 09/13/18 1600          PT Short Term Goals    STG Date to Achieve 09/13/18  -     STG 1 Pt indep with HEP  -     STG 1 Progress Ongoing  -     STG 2 Improve lumbar spine rotation AROM bilat to minimal 75% limit   -     STG 2 Progress Ongoing  -     STG 3 Reduce LBP with prolonged standing and walking by 25%  -     STG 3 Progress Ongoing  -     STG 4 Improve B hip flex/B knee ext MMT to 4+/5  -     STG 4 Progress Ongoing  -        Long Term Goals    LTG Date to Achieve 09/27/18  -     LTG 1 Reduce LBP with prolonged standing and walking by 50%  -     LTG 1 Progress Ongoing  -     LTG 2 Reduce Modified Oswestry score to 16 or less  -     LTG 2 Progress Ongoing  -        Time Calculation    PT Goal Re-Cert Due Date 09/20/18  -       User Key  (r) = Recorded By, (t) = Taken By, (c) = Cosigned By    Initials Name Provider Type     Ilda Bledsoe PTA Physical Therapy Assistant          Therapy Education  Education Details: all therex completed in treatment today added to HEP  Given: HEP, Symptoms/condition management, Pain management, Posture/body mechanics  Program: Reinforced  How Provided: Verbal, Demonstration, Written  Provided to: Patient  Level of Understanding: Teach back education performed, Verbalized, Demonstrated              Time Calculation:   Start Time: 1645  Stop Time: 1735  Time Calculation (min): 50 min  Total Timed Code Minutes- PT: 50 minute(s)    Therapy Charges for Today     Code Description Service Date Service Provider Modifiers Qty    41072576088 HC PT THER PROC EA 15 MIN 9/13/2018 Ilda Bledsoe PTA GP 3    34618428299 HC PT THER SUPP EA 15 MIN 9/13/2018 Ilda Bledsoe PTA GP 1                    Ilda Bledsoe  PTA  9/13/2018

## 2018-09-17 ENCOUNTER — OFFICE VISIT (OUTPATIENT)
Dept: FAMILY MEDICINE CLINIC | Facility: CLINIC | Age: 63
End: 2018-09-17

## 2018-09-17 VITALS
WEIGHT: 183 LBS | SYSTOLIC BLOOD PRESSURE: 126 MMHG | RESPIRATION RATE: 18 BRPM | HEIGHT: 66 IN | OXYGEN SATURATION: 95 % | HEART RATE: 93 BPM | TEMPERATURE: 97.9 F | BODY MASS INDEX: 29.41 KG/M2 | DIASTOLIC BLOOD PRESSURE: 78 MMHG

## 2018-09-17 DIAGNOSIS — M41.9 SCOLIOSIS, UNSPECIFIED SCOLIOSIS TYPE, UNSPECIFIED SPINAL REGION: Primary | ICD-10-CM

## 2018-09-17 PROCEDURE — 99213 OFFICE O/P EST LOW 20 MIN: CPT | Performed by: NURSE PRACTITIONER

## 2018-09-18 ENCOUNTER — HOSPITAL ENCOUNTER (OUTPATIENT)
Dept: PHYSICAL THERAPY | Facility: HOSPITAL | Age: 63
Setting detail: THERAPIES SERIES
Discharge: HOME | End: 2018-09-18

## 2018-09-18 DIAGNOSIS — M41.9 SCOLIOSIS OF THORACOLUMBAR SPINE, UNSPECIFIED SCOLIOSIS TYPE: Primary | ICD-10-CM

## 2018-09-18 PROCEDURE — 97110 THERAPEUTIC EXERCISES: CPT | Performed by: PHYSICAL THERAPIST

## 2018-09-18 NOTE — THERAPY DISCHARGE NOTE
Outpatient Physical Therapy Ortho Progress Note/Discharge  [facility]     Patient Name: Genna Garcia  : 1955  MRN: [id number]  Today's Date: 2018      Visit Date: 2018  Visits: 5/5  Insurance Visits Approved: 20 visits  Recert Due: N/A  MD Appt: TBD  Pain: pretreatment 0/10; post treatment 0/10  Improvement: pt is subjectively reporting 25% improvement since initial evaluation  There is no problem list on file for this patient.       Past Medical History:   Diagnosis Date   • Elevated cholesterol    • Endometriosis    • Falls    • GERD (gastroesophageal reflux disease)    • Osteoporosis    • Scoliosis of thoracolumbar spine    • Sinusitis         Past Surgical History:   Procedure Laterality Date   • APPENDECTOMY     • FOREARM SURGERY     • HYSTERECTOMY     • SALPINGO OOPHORECTOMY     • WRIST FRACTURE SURGERY Right          Visit Dx:     ICD-10-CM ICD-9-CM   1. Scoliosis of thoracolumbar spine, unspecified scoliosis type M41.9 737.30                 PT Ortho     Row Name 18 1700       Subjective Comments    Subjective Comments pt arrived 15 min late for re-cert, 25% improvement overall.  -BS       Precautions and Contraindications    Precautions/Limitations no known precautions/limitations  -BS    Precautions pt cannot lay flat secondary to reflux issues; elevate at least 30 degrees  -BS       Subjective Pain    Able to rate subjective pain? yes  -BS    Pre-Treatment Pain Level 0  -BS       Myotomal Screen- Lower Quarter Clearing    Hip flexion (L2) Right:;4+ (Good +);Left:;4 (Good)  -BS    Knee extension (L3) Right:;4 (Good);Left:;4+ (Good +)  -BS       Lumbar ROM Screen- Lower Quarter Clearing    Lumbar Rotation Impaired   R 50%, L 100% WNL  -BS      User Key  (r) = Recorded By, (t) = Taken By, (c) = Cosigned By    Initials Name Provider Type    Krunal Patel, PT Physical Therapist                       Therapy Education  Given: HEP, Symptoms/condition  management, Pain management, Posture/body mechanics  Program: Reinforced  How Provided: Verbal, Demonstration, Written  Provided to: Patient  Level of Understanding: Teach back education performed, Verbalized, Demonstrated          PT OP Goals     Row Name 09/18/18 1700          PT Short Term Goals    STG Date to Achieve 09/13/18  -BS     STG 1 Pt indep with HEP  -BS     STG 1 Progress Met  -BS     STG 2 Improve lumbar spine rotation AROM bilat to minimal 75% limit   -BS     STG 2 Progress Partially Met  -BS     STG 3 Reduce LBP with prolonged standing and walking by 25%  -BS     STG 3 Progress Met  -BS     STG 4 Improve B hip flex/B knee ext MMT to 4+/5  -BS     STG 4 Progress Not Met  -BS        Long Term Goals    LTG Date to Achieve 09/27/18  -BS     LTG 1 Reduce LBP with prolonged standing and walking by 50%  -BS     LTG 1 Progress Not Met  -BS     LTG 2 Reduce Modified Oswestry score to 16 or less  -BS     LTG 2 Progress Not Met  -BS        Time Calculation    PT Goal Re-Cert Due Date 09/18/18  -BS       User Key  (r) = Recorded By, (t) = Taken By, (c) = Cosigned By    Initials Name Provider Type    BS Krunal Wynn, PT Physical Therapist                PT Assessment/Plan     Row Name 09/18/18 1700          PT Assessment    Functional Limitations Impaired gait;Performance in work activities;Performance in sport activities;Performance in leisure activities;Limitation in home management  -BS     Impairments Gait;Endurance;Sensation;Pain;Muscle strength;Posture;Range of motion  -BS     Assessment Comments pt met 2 of 4 STG's, progressed toward all goals. Pt expressed desire to d/c from skilled PT at this time.  -BS     Please refer to paper survey for additional self-reported information Yes  -BS     Rehab Potential Fair  -BS     Patient/caregiver participated in establishment of treatment plan and goals Yes  -BS     Patient would benefit from skilled therapy intervention Yes  -BS        PT Plan    PT Frequency  "Other (comment)   d/c visit  -BS     Predicted Duration of Therapy Intervention (Therapy Eval) d/c visit  -BS     Planned CPT's? PT EVAL MOD COMPLELITY: 32266;PT THER PROC EA 15 MIN: 69116  -BS     Physical Therapy Interventions (Optional Details) postural re-education;joint mobilization;patient/family education;stretching;strengthening  -BS     PT Plan Comments d/c'd from PT per pt request  -BS       User Key  (r) = Recorded By, (t) = Taken By, (c) = Cosigned By    Initials Name Provider Type    Krunal Patel, PT Physical Therapist                Exercises     Row Name 09/18/18 1700             Subjective Comments    Subjective Comments pt arrived 15 min late for re-cert, 25% improvement overall.  -BS         Subjective Pain    Able to rate subjective pain? yes  -BS      Pre-Treatment Pain Level 0  -BS      Post-Treatment Pain Level 0  -BS         Exercise 1    Exercise Name 1 Pro II LE's  -BS      Time 1 5 minutes  -BS         Exercise 2    Exercise Name 2 standing lumbar ext (forearms on the wall)  -BS      Sets 2 1  -BS      Reps 2 10  -BS         Exercise 3    Exercise Name 3 seated B HS stretch  -BS      Reps 3 2  -BS      Time 3 30\" hold ea  -BS         Exercise 4    Exercise Name 4 B Sitting Piriformis S  -BS      Reps 4 2  -BS      Time 4 30 sec hold  -BS         Exercise 5    Exercise Name 5 seated thoracic ext w/ self mob (against towel roll)  -BS      Sets 5 1  -BS      Reps 5 15  -BS         Exercise 6    Exercise Name 6 standing thoracic flex w/ self OP  -BS      Sets 6 1  -BS      Reps 6 10  -BS        User Key  (r) = Recorded By, (t) = Taken By, (c) = Cosigned By    Initials Name Provider Type    Krunal Patel, PT Physical Therapist                       Outcome Measure Options: Modifed Owestry  Modified Oswestry  Modified Oswestry Score/Comments: 17/50-34%      Time Calculation:   Start Time: 1700  Stop Time: 1730  Time Calculation (min): 30 min  Total Timed Code Minutes- PT: 30 " minute(s)  Therapy Suggested Charges     Code   Minutes Charges    None           Therapy Charges for Today     Code Description Service Date Service Provider Modifiers Qty    29962035715 HC PT THER PROC EA 15 MIN 9/18/2018 Krunal Wynn, PT GP 2          PT G-Codes  Outcome Measure Options: Paul Fox  Modified Oswestry Score/Comments: 17/50-34%              Krunal Wynn, PT  9/18/2018

## 2018-09-20 ENCOUNTER — APPOINTMENT (OUTPATIENT)
Dept: PHYSICAL THERAPY | Facility: HOSPITAL | Age: 63
End: 2018-09-20

## 2018-10-02 ENCOUNTER — TELEPHONE (OUTPATIENT)
Dept: FAMILY MEDICINE CLINIC | Facility: CLINIC | Age: 63
End: 2018-10-02

## 2018-10-02 DIAGNOSIS — N39.0 URINARY TRACT INFECTION WITHOUT HEMATURIA, SITE UNSPECIFIED: Primary | ICD-10-CM

## 2018-10-02 RX ORDER — NITROFURANTOIN 25; 75 MG/1; MG/1
100 CAPSULE ORAL EVERY 12 HOURS SCHEDULED
Qty: 20 CAPSULE | Refills: 0 | Status: SHIPPED | OUTPATIENT
Start: 2018-10-02 | End: 2019-01-09

## 2018-10-02 NOTE — TELEPHONE ENCOUNTER
Patient called and has an uti and wants to know if you will send her in and antibiotic for it she has tried cranberry Juice but not helping.  Seems to be getting worse.

## 2018-10-18 DIAGNOSIS — M81.0 OSTEOPOROSIS, UNSPECIFIED OSTEOPOROSIS TYPE, UNSPECIFIED PATHOLOGICAL FRACTURE PRESENCE: ICD-10-CM

## 2018-10-18 RX ORDER — ALENDRONATE SODIUM 70 MG/1
70 TABLET ORAL
Qty: 12 TABLET | Refills: 3 | Status: SHIPPED | OUTPATIENT
Start: 2018-10-18 | End: 2019-01-09

## 2019-01-09 ENCOUNTER — OFFICE VISIT (OUTPATIENT)
Dept: OTOLARYNGOLOGY | Facility: CLINIC | Age: 64
End: 2019-01-09

## 2019-01-09 VITALS — WEIGHT: 182 LBS | BODY MASS INDEX: 29.25 KG/M2 | TEMPERATURE: 97.1 F | HEIGHT: 66 IN

## 2019-01-09 DIAGNOSIS — J34.89 NASAL VALVE STENOSIS: ICD-10-CM

## 2019-01-09 DIAGNOSIS — K21.9 LARYNGOPHARYNGEAL REFLUX (LPR): ICD-10-CM

## 2019-01-09 DIAGNOSIS — J34.3 NASAL TURBINATE HYPERTROPHY: Primary | ICD-10-CM

## 2019-01-09 PROCEDURE — 99213 OFFICE O/P EST LOW 20 MIN: CPT | Performed by: OTOLARYNGOLOGY

## 2019-01-09 RX ORDER — SUCRALFATE 1 G/1
1 TABLET ORAL
Qty: 30 TABLET | Refills: 4 | Status: SHIPPED | OUTPATIENT
Start: 2019-01-09 | End: 2019-10-25 | Stop reason: SDUPTHER

## 2019-01-09 RX ORDER — FAMOTIDINE 20 MG/1
20 TABLET, FILM COATED ORAL DAILY
Refills: 4 | COMMUNITY
Start: 2018-11-01 | End: 2019-10-25 | Stop reason: SDUPTHER

## 2019-01-09 NOTE — PROGRESS NOTES
Subjective   Genna Garcia is a 63 y.o. female.     Follow-up LPR  History of Present Illness    Patient states she's seeing gastroenterologist and had a dilation she swallowing better she still has reflux at night despite being on the excellent and Pepcid.  She's not taking an acid she's tried Zantac that didn't help she's not having choking or breathing problems      The following portions of the patient's history were reviewed and updated as appropriate: allergies, current medications, past family history, past medical history, past social history, past surgical history and problem list.      Current Outpatient Medications:   •  acetaminophen-codeine (TYLENOL #3) 300-30 MG per tablet, Take 1 tablet by mouth 2 (Two) Times a Day., Disp: 60 tablet, Rfl: 0  •  ALLEGRA-D ALLERGY & CONGESTION  MG per 12 hr tablet, TK 1 T PO BID, Disp: , Rfl: 0  •  DEXILANT 60 MG capsule, Take 60 mg by mouth Once., Disp: , Rfl: 6  •  EPINEPHrine (EPIPEN) 0.3 MG/0.3ML solution auto-injector injection, INJECT INTRAMUSCULARLY AS DIRECTED, Disp: , Rfl: 1  •  famotidine (PEPCID) 20 MG tablet, Take 20 mg by mouth Daily., Disp: , Rfl: 4  •  olopatadine (PATANASE) 0.6 % solution nasal solution, 2 sprays by Each Nare route 2 (Two) Times a Day., Disp: 30.5 g, Rfl: 6  •  sucralfate (CARAFATE) 1 g tablet, Take 1 tablet by mouth every night at bedtime., Disp: 30 tablet, Rfl: 4    Allergies   Allergen Reactions   • Augmentin [Amoxicillin-Pot Clavulanate] Diarrhea   • Sulfa Antibiotics              Review of Systems   Constitutional: Negative for fever.   HENT: Positive for congestion. Negative for trouble swallowing and voice change.         Burning sour taste and mornings   Hematological: Negative for adenopathy.           Objective   Physical Exam   Constitutional: She is oriented to person, place, and time. She appears well-developed and well-nourished.   HENT:   Head: Normocephalic and atraumatic.   Right Ear: Hearing, tympanic membrane,  external ear and ear canal normal.   Left Ear: Hearing, tympanic membrane, external ear and ear canal normal.   Nose: Nasal deformity and septal deviation present. No mucosal edema or rhinorrhea. No epistaxis. Right sinus exhibits no maxillary sinus tenderness and no frontal sinus tenderness. Left sinus exhibits no maxillary sinus tenderness and no frontal sinus tenderness.       Mouth/Throat: Uvula is midline, oropharynx is clear and moist and mucous membranes are normal. No trismus in the jaw. Normal dentition. No oropharyngeal exudate or posterior oropharyngeal edema.   Eyes: Conjunctivae are normal.   Neck: Normal range of motion. Neck supple. No JVD present. No tracheal deviation present. No thyromegaly present.   Pulmonary/Chest: Effort normal.   Musculoskeletal: Normal range of motion.   Lymphadenopathy:        Head (right side): No submental, no submandibular, no tonsillar, no preauricular, no posterior auricular and no occipital adenopathy present.        Head (left side): No submental, no submandibular, no tonsillar, no preauricular, no posterior auricular and no occipital adenopathy present.     She has no cervical adenopathy.        Right cervical: No superficial cervical, no deep cervical and no posterior cervical adenopathy present.       Left cervical: No superficial cervical, no deep cervical and no posterior cervical adenopathy present.   Neurological: She is alert and oriented to person, place, and time. No cranial nerve deficit.   Skin: Skin is warm.   Psychiatric: She has a normal mood and affect. Her speech is normal and behavior is normal. Thought content normal.   Nursing note and vitals reviewed.         mirror laryngoscopy reveals mild swelling but no new masses  Assessment/Plan   Genna was seen today for follow-up.    Diagnoses and all orders for this visit:    Nasal turbinate hypertrophy    Laryngopharyngeal reflux (LPR)    Nasal valve stenosis    Other orders  -     sucralfate (CARAFATE) 1  g tablet; Take 1 tablet by mouth every night at bedtime.    And Carafate    She is not interested nasal valve surgery she'll continue to use her Breathe Right strips   I gave her a card to call us and give us some feedback and how she is doing next month   otherwise will see her back in 6 months

## 2019-06-25 DIAGNOSIS — M41.9 SCOLIOSIS OF THORACOLUMBAR SPINE, UNSPECIFIED SCOLIOSIS TYPE: ICD-10-CM

## 2019-06-25 RX ORDER — ACETAMINOPHEN AND CODEINE PHOSPHATE 300; 30 MG/1; MG/1
TABLET ORAL
Qty: 60 TABLET | Refills: 0 | Status: CANCELLED | OUTPATIENT
Start: 2019-06-25

## 2019-06-26 ENCOUNTER — OFFICE VISIT (OUTPATIENT)
Dept: FAMILY MEDICINE CLINIC | Facility: CLINIC | Age: 64
End: 2019-06-26

## 2019-06-26 VITALS
WEIGHT: 179 LBS | TEMPERATURE: 98.2 F | SYSTOLIC BLOOD PRESSURE: 128 MMHG | BODY MASS INDEX: 28.77 KG/M2 | HEART RATE: 80 BPM | DIASTOLIC BLOOD PRESSURE: 78 MMHG | OXYGEN SATURATION: 95 % | HEIGHT: 66 IN

## 2019-06-26 DIAGNOSIS — M41.9 SCOLIOSIS OF THORACOLUMBAR SPINE, UNSPECIFIED SCOLIOSIS TYPE: ICD-10-CM
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
## 2019-06-26 DIAGNOSIS — R07.81 PLEURITIC PAIN: Primary | ICD-10-CM

## 2019-06-26 PROCEDURE — 99213 OFFICE O/P EST LOW 20 MIN: CPT | Performed by: NURSE PRACTITIONER

## 2019-06-26 RX ORDER — ALBUTEROL SULFATE 90 UG/1
AEROSOL, METERED RESPIRATORY (INHALATION)
Refills: 4 | COMMUNITY
Start: 2019-05-24 | End: 2019-10-15

## 2019-06-26 RX ORDER — METHYLPREDNISOLONE 4 MG/1
TABLET ORAL
Qty: 1 EACH | Refills: 0 | Status: SHIPPED | OUTPATIENT
Start: 2019-06-26 | End: 2019-10-15

## 2019-06-26 RX ORDER — ACETAMINOPHEN AND CODEINE PHOSPHATE 300; 30 MG/1; MG/1
1 TABLET ORAL 2 TIMES DAILY
Qty: 60 TABLET | Refills: 0 | Status: SHIPPED | OUTPATIENT
Start: 2019-06-26 | End: 2020-04-03 | Stop reason: SDUPTHER

## 2019-06-26 NOTE — PROGRESS NOTES
"Subjective   Genna Garcia is a 64 y.o. female.     [facility] Visit    PCP: MARCELLA Beal    CC: \"fell a week ago, though I broke a rib, but it has gotten worse.  Left rib pain behind breast, left lung and rib pain\"    PMH: scoliosis--needs refill of Tylenol #3--uses BID as needed--usually refilled by LISA Maier who is out of office today.       Rib Injury   This is a new problem. The current episode started in the past 7 days (tripped on her patio on 6-20-19 and fell onto her left side and has had left side rib pain since that time). The problem occurs daily. The problem has been gradually worsening (started getting worse on 6-24-19 and now having pain with deep breathing, coughing). Associated symptoms include chest pain (pleuritic ). Pertinent negatives include no abdominal pain, anorexia, arthralgias, change in bowel habit, chills, congestion, coughing, diaphoresis, fatigue, fever, headaches, joint swelling, myalgias, nausea, neck pain, numbness, rash, sore throat, swollen glands, urinary symptoms, vertigo, visual change, vomiting or weakness. The symptoms are aggravated by twisting and coughing (deep breathing). Treatments tried: tylenol #3, motrin, ice/heat. The treatment provided mild relief.        The following portions of the patient's history were reviewed and updated as appropriate: allergies, current medications, past medical history, past social history, past surgical history and problem list.    Review of Systems   Constitutional: Negative for appetite change, chills, diaphoresis, fatigue and fever.   HENT: Negative for congestion and sore throat.    Respiratory: Negative for cough, chest tightness, shortness of breath ( pain with deep breathing) and wheezing.    Cardiovascular: Positive for chest pain (pleuritic ). Negative for palpitations and leg swelling.   Gastrointestinal: Positive for GERD ( chronic). Negative for abdominal pain, anorexia, change in bowel habit, nausea and " "vomiting.   Genitourinary: Negative for difficulty urinating.   Musculoskeletal: Negative for arthralgias, joint swelling, myalgias and neck pain.   Skin: Negative for color change, rash and bruise.   Neurological: Negative for vertigo, weakness and numbness.     /78 (BP Location: Left arm, Patient Position: Sitting, Cuff Size: Adult)   Pulse 80   Temp 98.2 °F (36.8 °C) (Tympanic)   Ht 167.6 cm (66\")   Wt 81.2 kg (179 lb)   SpO2 95%   BMI 28.89 kg/m²     Objective   Physical Exam   Constitutional: She is oriented to person, place, and time. She appears well-developed and well-nourished. No distress ( no distress, but in some discomfort with movement, deep breathing).   Cardiovascular: Normal rate and regular rhythm.   Pulmonary/Chest: Effort normal and breath sounds normal. She has no wheezes. She has no rales.   Musculoskeletal:        Thoracic back: She exhibits decreased range of motion, tenderness, swelling ( mild) and deformity (scoliosis).        Lumbar back: She exhibits deformity (scoliosis).        Back:    Pain also noted to left anterior rib cage just below breast   Neurological: She is alert and oriented to person, place, and time.   Skin: Skin is warm and dry. No abrasion, no bruising, no ecchymosis, no laceration and no rash noted. No erythema.   Nursing note and vitals reviewed.    No results found for this or any previous visit (from the past 24 hour(s)).  Xr Ribs Left With Pa Chest    Result Date: 6/26/2019  CONCLUSION:    1. No evidence of an active cardiopulmonary process.  2. No evidence of a displaced rib fracture.     3. Thoracolumbar scoliosis.                                            Electronically signed by:  KEEGAN Sorensen MD  6/26/2019 11:56 AM CDT Workstation: [id number]        Assessment/Plan   Genna was seen today for rib injury and back pain.    Diagnoses and all orders for this visit:    Pleuritic pain  -     methylPREDNISolone (MEDROL, KIRTI,) 4 MG tablet; Take as " directed on package instructions.    Fall, initial encounter  -     XR Ribs Left With PA Chest  -     acetaminophen-codeine (TYLENOL #3) 300-30 MG per tablet; Take 1 tablet by mouth 2 (Two) Times a Day.    Scoliosis of thoracolumbar spine, unspecified scoliosis type  -     acetaminophen-codeine (TYLENOL #3) 300-30 MG per tablet; Take 1 tablet by mouth 2 (Two) Times a Day.      Rx for Medrol, refill of Tylenol #3--[facility] # [id number] reviewed    Continue with Motrin every 6-8 hours  Continue with heat/ice as needed    See PCP or RTC if symptoms persist/worsen  See PCP for routine f/u visit and management of chronic medical conditions

## 2019-06-26 NOTE — PATIENT INSTRUCTIONS
Pleurisy  Pleurisy is irritation and swelling (inflammation) of the linings of your lungs (pleura). This can cause pain in your chest, back, or shoulder. It can also cause trouble breathing.  Follow these instructions at home:  Medicines  · Take over-the-counter and prescription medicines only as told by your doctor.  · If you were prescribed antibiotic medicine, take it as told by your doctor. Do not stop taking the antibiotic even if you start to feel better.  Activity  · Rest and return to your normal activities as told by your doctor. Ask your doctor what activities are safe for you.  · Do not drive or use heavy machinery while taking prescription pain medicine.  General instructions  · Watch for any changes in your condition.  · Take deep breaths often, even if it is painful. This can help prevent lung problems.  · When lying down, lie on your painful side. This may help you feel less pain.  · Do not smoke. If you need help quitting, ask your doctor.  · Keep all follow-up visits as told by your doctor. This is important.  Contact a doctor if:  · You have pain that:  ? Gets worse.  ? Does not get better with medicine.  ? Lasts for more than 1 week.  · You have a fever or chills.  · You have a cough that does not get better at home.  · You have trouble breathing that does not get better at home.  · You cough up liquid that looks like pus (purulent secretions).  Get help right away if:  · Your lips, fingernails, or toenails turn dark or turn blue.  · You cough up blood.  · You have trouble breathing that gets worse.  · You are making loud noises when you breathe (wheezing) and this gets worse.  · You have pain that spreads to your neck, arms, or jaw.  · You get a rash.  · You throw up (vomit).  · You pass out (faint).  Summary  · Pleurisy is irritation and swelling (inflammation) of the linings of your lungs (pleura).  · Pleurisy can cause pain and trouble breathing.  · If you have a cough that does not get better  at home, contact your doctor.  · Get help right away if you are having trouble breathing and it is getting worse.  This information is not intended to replace advice given to you by your health care provider. Make sure you discuss any questions you have with your health care provider.  Document Released: 11/30/2009 Document Revised: 09/11/2017 Document Reviewed: 09/11/2017  [employer] Interactive Patient Education © 2019 [employer]

## 2019-08-07 ENCOUNTER — OFFICE VISIT (OUTPATIENT)
Dept: OTOLARYNGOLOGY | Facility: CLINIC | Age: 64
End: 2019-08-07

## 2019-08-07 VITALS — TEMPERATURE: 97.4 F | WEIGHT: 179 LBS | HEIGHT: 66 IN | BODY MASS INDEX: 28.77 KG/M2

## 2019-08-07 DIAGNOSIS — J34.89 NASAL VALVE STENOSIS: ICD-10-CM

## 2019-08-07 DIAGNOSIS — R13.14 PHARYNGOESOPHAGEAL DYSPHAGIA: ICD-10-CM

## 2019-08-07 DIAGNOSIS — J34.2 NASAL SEPTAL DEVIATION: ICD-10-CM

## 2019-08-07 DIAGNOSIS — J34.89 NASAL VALVE BLOCKAGE: ICD-10-CM

## 2019-08-07 DIAGNOSIS — K21.9 LARYNGOPHARYNGEAL REFLUX (LPR): Primary | ICD-10-CM

## 2019-08-07 PROCEDURE — 31575 DIAGNOSTIC LARYNGOSCOPY: CPT | Performed by: OTOLARYNGOLOGY

## 2019-08-07 PROCEDURE — 99213 OFFICE O/P EST LOW 20 MIN: CPT | Performed by: OTOLARYNGOLOGY

## 2019-08-07 RX ORDER — DOXYCYCLINE 100 MG/1
100 CAPSULE ORAL DAILY
Refills: 0 | COMMUNITY
Start: 2019-07-30 | End: 2019-08-07

## 2019-08-07 RX ORDER — NICOTINE POLACRILEX 2 MG
1 GUM BUCCAL DAILY
COMMUNITY

## 2019-08-07 NOTE — PATIENT INSTRUCTIONS

## 2019-08-07 NOTE — PROGRESS NOTES
Subjective   Genna Garcia is a 64 y.o. female.     Follow-up nose throat  History of Present Illness   Since not having sore throat just have choking cannot swallow pills has thick mucus that collects and hangs up in her throat she has severe reflux can go back and see her other GI physician had to have a dilated esophagus previously.  She wants to see someone new also has some lung problems and seen Dr. Sampson and told she had abnormal chest x-ray is concerned that may be causing her cough to she has not discussed this recently with her primary physician  No neck mass is not losing weight she does say that her voice is changed and her range is not as good as though she is not complete lost her voice she does have trouble with pills in addition to other things guarding her swallowing    The following portions of the patient's history were reviewed and updated as appropriate: allergies, current medications, past family history, past medical history, past social history, past surgical history and problem list.      Current Outpatient Medications:   •  acetaminophen-codeine (TYLENOL #3) 300-30 MG per tablet, Take 1 tablet by mouth 2 (Two) Times a Day., Disp: 60 tablet, Rfl: 0  •  albuterol sulfate  (90 Base) MCG/ACT inhaler, INHALE 2 PUFFS PO Q 4 TO 6 H PRN SOB OR WHEEZING, Disp: , Rfl: 4  •  ALLEGRA-D ALLERGY & CONGESTION  MG per 12 hr tablet, TK 1 T PO BID, Disp: , Rfl: 0  •  Biotin 1 MG capsule, Take  by mouth., Disp: , Rfl:   •  EPINEPHrine (EPIPEN) 0.3 MG/0.3ML solution auto-injector injection, INJECT INTRAMUSCULARLY AS DIRECTED, Disp: , Rfl: 1  •  famotidine (PEPCID) 20 MG tablet, Take 20 mg by mouth Daily., Disp: , Rfl: 4  •  olopatadine (PATANASE) 0.6 % solution nasal solution, 2 sprays by Each Nare route 2 (Two) Times a Day., Disp: 30.5 g, Rfl: 6  •  sucralfate (CARAFATE) 1 g tablet, Take 1 tablet by mouth every night at bedtime., Disp: 30 tablet, Rfl: 4  •  methylPREDNISolone (MEDROL, KIRTI,)  4 MG tablet, Take as directed on package instructions., Disp: 1 each, Rfl: 0    Allergies   Allergen Reactions   • Augmentin [Amoxicillin-Pot Clavulanate] Diarrhea   • Sulfa Antibiotics              Review of Systems   Constitutional: Negative for fever.   HENT: Positive for trouble swallowing and voice change.    Respiratory: Positive for cough. Negative for shortness of breath.            Objective   Physical Exam   Constitutional: She is oriented to person, place, and time. She appears well-developed and well-nourished.   HENT:   Head: Normocephalic and atraumatic.   Right Ear: Hearing, tympanic membrane, external ear and ear canal normal.   Left Ear: Hearing, tympanic membrane, external ear and ear canal normal.   Nose: Nasal deformity and septal deviation present. No mucosal edema or rhinorrhea. No epistaxis. Right sinus exhibits no maxillary sinus tenderness and no frontal sinus tenderness. Left sinus exhibits no maxillary sinus tenderness and no frontal sinus tenderness.       Mouth/Throat: Uvula is midline, oropharynx is clear and moist and mucous membranes are normal. No trismus in the jaw. Normal dentition. No oropharyngeal exudate or posterior oropharyngeal edema. Tonsils are 1+ on the right. Tonsils are 1+ on the left. No tonsillar exudate.   Eyes: Conjunctivae are normal.   Neck: Neck supple. No JVD present. No tracheal deviation present. No thyromegaly present.   Pulmonary/Chest: Effort normal.   Musculoskeletal: Normal range of motion.   Lymphadenopathy:        Head (right side): No submental, no submandibular, no tonsillar, no preauricular, no posterior auricular and no occipital adenopathy present.        Head (left side): No submental, no submandibular, no tonsillar, no preauricular, no posterior auricular and no occipital adenopathy present.     She has no cervical adenopathy.        Right cervical: No superficial cervical, no deep cervical and no posterior cervical adenopathy present.       Left  cervical: No superficial cervical, no deep cervical and no posterior cervical adenopathy present.   Neurological: She is alert and oriented to person, place, and time. No cranial nerve deficit.   Skin: Skin is warm.   Psychiatric: She has a normal mood and affect. Her speech is normal and behavior is normal. Thought content normal.   Nursing note and vitals reviewed.        Procedure Note    Pre-operative Diagnosis:   Chief Complaint   Patient presents with   • Follow-up   Dysphasia voice changes    Post-operative Diagnosis: same    Anesthesia: topical with xylocaine and neosynephrine    Endoscopy Type:  Flexible Laryngoscopy    Procedure Details:    The patient was placed in the sitting position.  After topical anesthesia and decongestion, the 4 mm laryngoscope was passed.  The nasal cavities, nasopharynx, oropharynx, hypopharynx, and larynx were all examined.  Vocal cords were examined during respiration and phonation.  The following findings were noted:    Findings: Previously noted nasal findings were confirmed. Nasopharynx without mass, hypopharynx and larynx without evidence of neoplasm. Vocal cord mobility intact. There is chronic appearing edema and erythema of the laryngeal structures consistent with chronic laryngitis.    Condition:  Stable.  Patient tolerated procedure well.    Complications:  None  Assessment/Plan   Genna was seen today for follow-up.    Diagnoses and all orders for this visit:    Laryngopharyngeal reflux (LPR)  -     Ambulatory Referral to Gastroenterology    Nasal valve stenosis    Nasal valve blockage    Nasal septal deviation    Pharyngoesophageal dysphagia  -     Ambulatory Referral to Gastroenterology    No new medications medications get GI opinion  She is not interested in nasal surgery  \  Previous CT of the sinuses we will hold off on that we will get GI opinion regarding her swallowing and she is to talk to her regular physician whether she had to have a other lung test done  because she apparently had abnormal x-rays

## 2019-09-05 ENCOUNTER — OFFICE VISIT (OUTPATIENT)
Dept: GASTROENTEROLOGY | Facility: CLINIC | Age: 64
End: 2019-09-05

## 2019-09-05 VITALS
SYSTOLIC BLOOD PRESSURE: 140 MMHG | HEART RATE: 101 BPM | DIASTOLIC BLOOD PRESSURE: 80 MMHG | OXYGEN SATURATION: 97 % | WEIGHT: 178 LBS | BODY MASS INDEX: 28.61 KG/M2 | HEIGHT: 66 IN

## 2019-09-05 DIAGNOSIS — R13.19 OTHER DYSPHAGIA: Primary | ICD-10-CM

## 2019-09-05 PROCEDURE — 99203 OFFICE O/P NEW LOW 30 MIN: CPT | Performed by: INTERNAL MEDICINE

## 2019-09-05 RX ORDER — PANTOPRAZOLE SODIUM 40 MG/1
40 TABLET, DELAYED RELEASE ORAL DAILY
COMMUNITY
Start: 2019-08-19 | End: 2019-10-25 | Stop reason: SDUPTHER

## 2019-09-05 RX ORDER — SODIUM CHLORIDE 0.9 % (FLUSH) 0.9 %
3 SYRINGE (ML) INJECTION EVERY 12 HOURS SCHEDULED
Status: CANCELLED | OUTPATIENT
Start: 2019-10-16

## 2019-09-05 RX ORDER — SODIUM CHLORIDE 0.9 % (FLUSH) 0.9 %
10 SYRINGE (ML) INJECTION AS NEEDED
Status: CANCELLED | OUTPATIENT
Start: 2019-10-16

## 2019-09-05 RX ORDER — DEXTROSE AND SODIUM CHLORIDE 5; .45 G/100ML; G/100ML
30 INJECTION, SOLUTION INTRAVENOUS CONTINUOUS PRN
Status: CANCELLED | OUTPATIENT
Start: 2019-10-16

## 2019-09-05 NOTE — PROGRESS NOTES
[facility]      Chief Complaint:   Chief Complaint   Patient presents with   • Heartburn   • Difficulty Swallowing       Subjective     HPI:   Patient with dysphasia.  Patient states that food is getting caught in her esophagus and choking her at night.  Patient states that she feels it irritating her vocal cords.  Patient has been seen by ENT who asked her to come to see me as she may need dilatation of her esophagus again patient has had dilatation previously.    Plan; we will schedule patient for EGD to evaluate with probable dilatation    Past Medical History:   Past Medical History:   Diagnosis Date   • Elevated cholesterol    • Endometriosis    • Falls    • GERD (gastroesophageal reflux disease)    • Osteoporosis    • Scoliosis of thoracolumbar spine    • Sinusitis        Past Surgical History:  Past Surgical History:   Procedure Laterality Date   • APPENDECTOMY     • FOREARM SURGERY     • HYSTERECTOMY     • SALPINGO OOPHORECTOMY     • WRIST FRACTURE SURGERY Right        Family History:  Family History   Problem Relation Age of Onset   • Heart failure Mother    • Thyroid disease Mother    • Ulcerative colitis Father        Social History:   reports that she has quit smoking. She has never used smokeless tobacco. She reports that she does not drink alcohol or use drugs.    Medications:   Prior to Admission medications    Medication Sig Start Date End Date Taking? Authorizing Provider   acetaminophen-codeine (TYLENOL #3) 300-30 MG per tablet Take 1 tablet by mouth 2 (Two) Times a Day. 6/26/19  Yes Brad George APRN   albuterol sulfate  (90 Base) MCG/ACT inhaler INHALE 2 PUFFS PO Q 4 TO 6 H PRN SOB OR WHEEZING 5/24/19  Yes Provider, MD Roosevelt   ALLEGRA-D ALLERGY & CONGESTION  MG per 12 hr tablet TK 1 T PO BID 6/2/17  Yes Provider, MD Roosevelt   Biotin 1 MG capsule Take  by mouth.   Yes Provider, MD Roosevelt   EPINEPHrine (EPIPEN) 0.3 MG/0.3ML solution  "auto-injector injection INJECT INTRAMUSCULARLY AS DIRECTED 5/5/17  Yes Roosevelt Bryant MD   famotidine (PEPCID) 20 MG tablet Take 20 mg by mouth Daily. 11/1/18  Yes Roosevelt Bryant MD   olopatadine (PATANASE) 0.6 % solution nasal solution 2 sprays by Each Nare route 2 (Two) Times a Day. 7/11/18  Yes Alejandro Gonzalez MD   pantoprazole (PROTONIX) 40 MG EC tablet  8/19/19  Yes Roosevelt Bryant MD   sucralfate (CARAFATE) 1 g tablet Take 1 tablet by mouth every night at bedtime. 1/9/19  Yes Alejandro Gonzalez MD   methylPREDNISolone (MEDROL, KIRTI,) 4 MG tablet Take as directed on package instructions. 6/26/19   Brad George APRN       Allergies:  Augmentin [amoxicillin-pot clavulanate] and Sulfa antibiotics    ROS:    Review of Systems   Constitutional: Negative for activity change, appetite change, chills, diaphoresis, fatigue, fever and unexpected weight change.   HENT: Positive for trouble swallowing. Negative for sore throat.    Respiratory: Negative for shortness of breath.    Gastrointestinal: Negative for abdominal distention, abdominal pain, anal bleeding, blood in stool, constipation, diarrhea, nausea, rectal pain and vomiting.   Endocrine: Negative for polydipsia, polyphagia and polyuria.   Genitourinary: Negative for difficulty urinating.   Musculoskeletal: Negative for arthralgias.   Skin: Negative for pallor.   Allergic/Immunologic: Negative for food allergies.   Neurological: Negative for weakness and light-headedness.   Psychiatric/Behavioral: Negative for behavioral problems.     Objective     Blood pressure 140/80, pulse 101, height 167.6 cm (66\"), weight 80.7 kg (178 lb), SpO2 97 %.    Physical Exam   Constitutional: She is oriented to person, place, and time. She appears well-developed and well-nourished. No distress.   HENT:   Head: Normocephalic and atraumatic.   Cardiovascular: Normal rate, regular rhythm, normal heart sounds and intact distal pulses. Exam reveals no gallop and no " friction rub.   No murmur heard.  Pulmonary/Chest: Breath sounds normal. No respiratory distress. She has no wheezes. She has no rales. She exhibits no tenderness.   Abdominal: Soft. Bowel sounds are normal. She exhibits no distension and no mass. There is no tenderness. There is no rebound and no guarding. No hernia.   Musculoskeletal: Normal range of motion. She exhibits no edema.   Neurological: She is alert and oriented to person, place, and time.   Skin: Skin is warm and dry. No rash noted. She is not diaphoretic. No erythema. No pallor.   Psychiatric: She has a normal mood and affect. Her behavior is normal. Judgment and thought content normal.        Assessment/Plan   Genna was seen today for heartburn and difficulty swallowing.    Diagnoses and all orders for this visit:    Other dysphagia  -     Case Request; Standing  -     sodium chloride 0.9 % flush 3 mL  -     sodium chloride 0.9 % flush 10 mL  -     dextrose 5 % and sodium chloride 0.45 % infusion  -     Case Request    Other orders  -     Follow Anesthesia Guidelines / Standing Orders; Future  -     Obtain Informed Consent; Future  -     Implement Anesthesia Orders Day of Procedure; Standing  -     Obtain Informed Consent; Standing  -     POC Glucose Once; Standing  -     Insert Peripheral IV; Standing  -     Saline Lock & Maintain IV Access; Standing        ESOPHAGOGASTRODUODENOSCOPY (N/A)     Diagnosis Plan   1. Other dysphagia  Case Request    sodium chloride 0.9 % flush 3 mL    sodium chloride 0.9 % flush 10 mL    dextrose 5 % and sodium chloride 0.45 % infusion    Case Request       Anticipated Surgical Procedure:  Orders Placed This Encounter   Procedures   • Follow Anesthesia Guidelines / Standing Orders     Standing Status:   Future   • Obtain Informed Consent     Standing Status:   Future     Order Specific Question:   Informed Consent Given For     Answer:   ESOPHAGOGASTRODUODENOSCOPY       The risks, benefits, and alternatives of this  procedure have been discussed with the patient or the responsible party- the patient understands and agrees to proceed.

## 2019-09-05 NOTE — PATIENT INSTRUCTIONS
Heartburn    Heartburn is a type of pain or discomfort that can happen in the throat or chest. It is often described as a burning pain. It may also cause a bad taste in the mouth. Heartburn may feel worse when you lie down or bend over. It may be caused by stomach contents that move back up (reflux) into the tube that connects the mouth with the stomach (esophagus).  Follow these instructions at home:  Take these actions to lessen your discomfort and to help avoid problems.  Diet  · Follow a diet as told by your doctor. You may need to avoid foods and drinks such as:  ? Coffee and tea (with or without caffeine).  ? Drinks that contain alcohol.  ? Energy drinks and sports drinks.  ? Carbonated drinks or sodas.  ? Chocolate and cocoa.  ? Peppermint and mint flavorings.  ? Garlic and onions.  ? Horseradish.  ? Spicy and acidic foods, such as peppers, chili powder, brambila powder, vinegar, hot sauces, and BBQ sauce.  ? Citrus fruit juices and citrus fruits, such as oranges, jacques, and limes.  ? Tomato-based foods, such as red sauce, chili, salsa, and pizza with red sauce.  ? Fried and fatty foods, such as donuts, french fries, potato chips, and high-fat dressings.  ? High-fat meats, such as hot dogs, rib eye steak, sausage, ham, and betancourt.  ? High-fat dairy items, such as whole milk, butter, and cream cheese.  · Eat small meals often. Avoid eating large meals.  · Avoid drinking large amounts of liquid with your meals.  · Avoid eating meals during the 2-3 hours before bedtime.  · Avoid lying down right after you eat.  · Do not exercise right after you eat.  General instructions  · Pay attention to any changes in your symptoms.  · Take over-the-counter and prescription medicines only as told by your doctor. Do not take aspirin, ibuprofen, or other NSAIDs unless your doctor says it is okay.  · Do not use any tobacco products, including cigarettes, chewing tobacco, and e-cigarettes. If you need help quitting, ask your  doctor.  · Wear loose clothes. Do not wear anything tight around your waist.  · Raise (elevate) the head of your bed about 6 inches (15 cm).  · Try to lower your stress. If you need help doing this, ask your doctor.  · If you are overweight, lose an amount of weight that is healthy for you. Ask your doctor about a safe weight loss goal.  · Keep all follow-up visits as told by your doctor. This is important.  Contact a doctor if:  · You have new symptoms.  · You lose weight and you do not know why it is happening.  · You have trouble swallowing, or it hurts to swallow.  · You have wheezing or a cough that keeps happening.  · Your symptoms do not get better with treatment.  · You have heartburn often for more than two weeks.  Get help right away if:  · You have pain in your arms, neck, jaw, teeth, or back.  · You feel sweaty, dizzy, or light-headed.  · You have chest pain or shortness of breath.  · You throw up (vomit) and your throw up looks like blood or coffee grounds.  · Your poop (stool) is bloody or black.  This information is not intended to replace advice given to you by your health care provider. Make sure you discuss any questions you have with your health care provider.  Document Released: 08/29/2012 Document Revised: 05/25/2017 Document Reviewed: 04/13/2016  [employer] Interactive Patient Education © 2019 [employer]

## 2019-10-15 RX ORDER — ALBUTEROL SULFATE 90 UG/1
2 AEROSOL, METERED RESPIRATORY (INHALATION) EVERY 4 HOURS PRN
COMMUNITY
End: 2022-03-30

## 2019-10-16 ENCOUNTER — HOSPITAL ENCOUNTER (OUTPATIENT)
Facility: HOSPITAL | Age: 64
Setting detail: HOSPITAL OUTPATIENT SURGERY
Discharge: HOME | End: 2019-10-16
Attending: INTERNAL MEDICINE | Admitting: INTERNAL MEDICINE

## 2019-10-16 ENCOUNTER — ANESTHESIA (OUTPATIENT)
Dept: GASTROENTEROLOGY | Facility: HOSPITAL | Age: 64
End: 2019-10-16

## 2019-10-16 ENCOUNTER — ANESTHESIA EVENT (OUTPATIENT)
Dept: GASTROENTEROLOGY | Facility: HOSPITAL | Age: 64
End: 2019-10-16

## 2019-10-16 VITALS
HEART RATE: 85 BPM | DIASTOLIC BLOOD PRESSURE: 74 MMHG | RESPIRATION RATE: 18 BRPM | WEIGHT: 175.93 LBS | SYSTOLIC BLOOD PRESSURE: 124 MMHG | BODY MASS INDEX: 30.04 KG/M2 | OXYGEN SATURATION: 97 % | TEMPERATURE: 97.3 F | HEIGHT: 64 IN

## 2019-10-16 DIAGNOSIS — R13.19 OTHER DYSPHAGIA: ICD-10-CM

## 2019-10-16 PROCEDURE — 88305 TISSUE EXAM BY PATHOLOGIST: CPT | Performed by: INTERNAL MEDICINE

## 2019-10-16 PROCEDURE — 88305 TISSUE EXAM BY PATHOLOGIST: CPT | Performed by: PATHOLOGY

## 2019-10-16 PROCEDURE — 25010000002 PROPOFOL 10 MG/ML EMULSION: Performed by: NURSE ANESTHETIST, CERTIFIED REGISTERED

## 2019-10-16 PROCEDURE — 43239 EGD BIOPSY SINGLE/MULTIPLE: CPT | Performed by: INTERNAL MEDICINE

## 2019-10-16 PROCEDURE — 43248 EGD GUIDE WIRE INSERTION: CPT | Performed by: INTERNAL MEDICINE

## 2019-10-16 RX ORDER — PROPOFOL 10 MG/ML
VIAL (ML) INTRAVENOUS AS NEEDED
Status: DISCONTINUED | OUTPATIENT
Start: 2019-10-16 | End: 2019-10-16 | Stop reason: SURG

## 2019-10-16 RX ORDER — PROMETHAZINE HYDROCHLORIDE 25 MG/ML
12.5 INJECTION, SOLUTION INTRAMUSCULAR; INTRAVENOUS ONCE AS NEEDED
Status: DISCONTINUED | OUTPATIENT
Start: 2019-10-16 | End: 2019-10-16 | Stop reason: HOSPADM

## 2019-10-16 RX ORDER — PROMETHAZINE HYDROCHLORIDE 25 MG/1
25 SUPPOSITORY RECTAL ONCE AS NEEDED
Status: DISCONTINUED | OUTPATIENT
Start: 2019-10-16 | End: 2019-10-16 | Stop reason: HOSPADM

## 2019-10-16 RX ORDER — PROMETHAZINE HYDROCHLORIDE 25 MG/1
25 TABLET ORAL ONCE AS NEEDED
Status: DISCONTINUED | OUTPATIENT
Start: 2019-10-16 | End: 2019-10-16 | Stop reason: HOSPADM

## 2019-10-16 RX ORDER — ONDANSETRON 2 MG/ML
4 INJECTION INTRAMUSCULAR; INTRAVENOUS ONCE AS NEEDED
Status: DISCONTINUED | OUTPATIENT
Start: 2019-10-16 | End: 2019-10-16 | Stop reason: HOSPADM

## 2019-10-16 RX ORDER — LIDOCAINE HYDROCHLORIDE 20 MG/ML
INJECTION, SOLUTION EPIDURAL; INFILTRATION; INTRACAUDAL; PERINEURAL AS NEEDED
Status: DISCONTINUED | OUTPATIENT
Start: 2019-10-16 | End: 2019-10-16 | Stop reason: SURG

## 2019-10-16 RX ORDER — SODIUM CHLORIDE 0.9 % (FLUSH) 0.9 %
10 SYRINGE (ML) INJECTION AS NEEDED
Status: DISCONTINUED | OUTPATIENT
Start: 2019-10-16 | End: 2019-10-16 | Stop reason: HOSPADM

## 2019-10-16 RX ORDER — DEXTROSE AND SODIUM CHLORIDE 5; .45 G/100ML; G/100ML
30 INJECTION, SOLUTION INTRAVENOUS CONTINUOUS PRN
Status: DISCONTINUED | OUTPATIENT
Start: 2019-10-16 | End: 2019-10-16 | Stop reason: HOSPADM

## 2019-10-16 RX ORDER — SODIUM CHLORIDE 0.9 % (FLUSH) 0.9 %
3 SYRINGE (ML) INJECTION EVERY 12 HOURS SCHEDULED
Status: DISCONTINUED | OUTPATIENT
Start: 2019-10-16 | End: 2019-10-16 | Stop reason: HOSPADM

## 2019-10-16 RX ADMIN — DEXTROSE AND SODIUM CHLORIDE 30 ML/HR: 5; 450 INJECTION, SOLUTION INTRAVENOUS at 14:57

## 2019-10-16 RX ADMIN — PROPOFOL 150 MG: 10 INJECTION, EMULSION INTRAVENOUS at 15:12

## 2019-10-16 RX ADMIN — LIDOCAINE HYDROCHLORIDE 50 MG: 20 INJECTION, SOLUTION EPIDURAL; INFILTRATION; INTRACAUDAL at 15:12

## 2019-10-16 RX ADMIN — LIDOCAINE HYDROCHLORIDE 50 MG: 20 INJECTION, SOLUTION EPIDURAL; INFILTRATION; INTRACAUDAL at 15:10

## 2019-10-16 NOTE — ANESTHESIA PREPROCEDURE EVALUATION
Anesthesia Evaluation     Patient summary reviewed and Nursing notes reviewed   NPO Solid Status: > 8 hours  NPO Liquid Status: > 8 hours           Airway   No difficulty expected  Dental      Pulmonary - negative pulmonary ROS and normal exam   Cardiovascular - normal exam    (+) hyperlipidemia,       Neuro/Psych- negative ROS  GI/Hepatic/Renal/Endo    (+)  GERD,      Musculoskeletal     Abdominal  - normal exam   Substance History - negative use     OB/GYN          Other   (+) arthritis                     Anesthesia Plan    ASA 3     MAC     intravenous induction   Anesthetic plan, all risks, benefits, and alternatives have been provided, discussed and informed consent has been obtained with: patient.    Plan discussed with CRNA.

## 2019-10-16 NOTE — H&P
"[facility]      Chief Complaint:   No chief complaint on file.      Subjective     HPI:   Patient with dysphasia.  Patient states that food is getting caught in her esophagus and choking her at night.  Patient states that she feels it irritating her vocal cords.  Patient has been seen by ENT who asked her to come to see me as she may need dilatation of her esophagus again patient has had dilatation previously.    Plan; we will schedule patient for EGD to evaluate with probable dilatation    Past Medical History:   Past Medical History:   Diagnosis Date   • Anesthesia complication     states she \"stopped breathing during her last procedure\"   • Arthritis    • Elevated cholesterol    • Endometriosis    • Falls    • GERD (gastroesophageal reflux disease)    • Osteoporosis    • Scoliosis of thoracolumbar spine    • Seasonal rhinitis    • Sinusitis        Past Surgical History:    Past Surgical History:   Procedure Laterality Date   • APPENDECTOMY     • FOREARM SURGERY     • HYSTERECTOMY     • SALPINGO OOPHORECTOMY     • WRIST FRACTURE SURGERY Right        Family History:  Family History   Problem Relation Age of Onset   • Heart failure Mother    • Thyroid disease Mother    • Ulcerative colitis Father        Social History:   reports that she quit smoking about 17 years ago. She quit after 20.00 years of use. She has never used smokeless tobacco. She reports that she does not drink alcohol or use drugs.    Medications:   Prior to Admission medications    Medication Sig Start Date End Date Taking? Authorizing Provider   acetaminophen-codeine (TYLENOL #3) 300-30 MG per tablet Take 1 tablet by mouth 2 (Two) Times a Day. 6/26/19  Yes Brad George APRN   albuterol sulfate  (90 Base) MCG/ACT inhaler INHALE 2 PUFFS PO Q 4 TO 6 H PRN SOB OR WHEEZING 5/24/19  Yes Provider, MD Roosevelt   ALLEGRA-D ALLERGY & CONGESTION  MG per 12 hr tablet TK 1 T PO BID 6/2/17  Yes Provider, MD Roosevelt " "  Biotin 1 MG capsule Take  by mouth.   Yes ProviderRoosevelt MD   EPINEPHrine (EPIPEN) 0.3 MG/0.3ML solution auto-injector injection INJECT INTRAMUSCULARLY AS DIRECTED 5/5/17  Yes Roosevelt Bryant MD   famotidine (PEPCID) 20 MG tablet Take 20 mg by mouth Daily. 11/1/18  Yes Roosevelt Bryant MD   olopatadine (PATANASE) 0.6 % solution nasal solution 2 sprays by Each Nare route 2 (Two) Times a Day. 7/11/18  Yes Alejandro Gonzalez MD   pantoprazole (PROTONIX) 40 MG EC tablet  8/19/19  Yes ProviderRoosevelt MD   sucralfate (CARAFATE) 1 g tablet Take 1 tablet by mouth every night at bedtime. 1/9/19  Yes Alejandro Gonzalez MD   methylPREDNISolone (MEDROL, KIRTI,) 4 MG tablet Take as directed on package instructions. 6/26/19   Brad George APRN       Allergies:  Augmentin [amoxicillin-pot clavulanate] and Sulfa antibiotics    ROS:    Review of Systems   Constitutional: Negative for activity change, appetite change, chills, diaphoresis, fatigue, fever and unexpected weight change.   HENT: Positive for trouble swallowing. Negative for sore throat.    Respiratory: Negative for shortness of breath.    Gastrointestinal: Negative for abdominal distention, abdominal pain, anal bleeding, blood in stool, constipation, diarrhea, nausea, rectal pain and vomiting.   Endocrine: Negative for polydipsia, polyphagia and polyuria.   Genitourinary: Negative for difficulty urinating.   Musculoskeletal: Negative for arthralgias.   Skin: Negative for pallor.   Allergic/Immunologic: Negative for food allergies.   Neurological: Negative for weakness and light-headedness.   Psychiatric/Behavioral: Negative for behavioral problems.     Objective     Height 167.6 cm (66\"), weight 79.4 kg (175 lb).    Physical Exam   Constitutional: She is oriented to person, place, and time. She appears well-developed and well-nourished. No distress.   HENT:   Head: Normocephalic and atraumatic.   Cardiovascular: Normal rate, regular rhythm, normal " heart sounds and intact distal pulses. Exam reveals no gallop and no friction rub.   No murmur heard.  Pulmonary/Chest: Breath sounds normal. No respiratory distress. She has no wheezes. She has no rales. She exhibits no tenderness.   Abdominal: Soft. Bowel sounds are normal. She exhibits no distension and no mass. There is no tenderness. There is no rebound and no guarding. No hernia.   Musculoskeletal: Normal range of motion. She exhibits no edema.   Neurological: She is alert and oriented to person, place, and time.   Skin: Skin is warm and dry. No rash noted. She is not diaphoretic. No erythema. No pallor.   Psychiatric: She has a normal mood and affect. Her behavior is normal. Judgment and thought content normal.        Assessment/Plan   Genna was seen today for heartburn and difficulty swallowing.    Diagnoses and all orders for this visit:    Other dysphagia  -     Case Request; Standing  -     sodium chloride 0.9 % flush 3 mL  -     sodium chloride 0.9 % flush 10 mL  -     dextrose 5 % and sodium chloride 0.45 % infusion  -     Case Request    Other orders  -     Follow Anesthesia Guidelines / Standing Orders; Future  -     Obtain Informed Consent; Future  -     Implement Anesthesia Orders Day of Procedure; Standing  -     Obtain Informed Consent; Standing  -     POC Glucose Once; Standing  -     Insert Peripheral IV; Standing  -     Saline Lock & Maintain IV Access; Standing        ESOPHAGOGASTRODUODENOSCOPY (N/A)    No diagnosis found.    Anticipated Surgical Procedure:  No orders of the defined types were placed in this encounter.      The risks, benefits, and alternatives of this procedure have been discussed with the patient or the responsible party- the patient understands and agrees to proceed.

## 2019-10-16 NOTE — ANESTHESIA POSTPROCEDURE EVALUATION
Patient: Genna Garcia    Procedure Summary     Date:  10/16/19 Room / Location:  [facility] ENDOSCOPY 1 / [facility] ENDOSCOPY    Anesthesia Start:  1511 Anesthesia Stop:  1525    Procedure:  ESOPHAGOGASTRODUODENOSCOPY (N/A ) Diagnosis:       Other dysphagia      (Other dysphagia [R13.19])    Surgeon:  Evan Rondon MD Provider:  Tung Talbot CRNA    Anesthesia Type:  MAC ASA Status:  3          Anesthesia Type: MAC  Last vitals  BP   (!) 145/111 (10/16/19 1451)   Temp   98.1 °F (36.7 °C) (10/16/19 1451)   Pulse   101 (10/16/19 1451)   Resp   16 (10/16/19 1451)     SpO2   98 % (10/16/19 1451)     Post Anesthesia Care and Evaluation    Patient location during evaluation: bedside  Patient participation: complete - patient participated  Level of consciousness: awake and alert  Pain score: 1  Pain management: adequate  Airway patency: patent  Anesthetic complications: No anesthetic complications  PONV Status: none  Cardiovascular status: acceptable  Respiratory status: acceptable  Hydration status: acceptable  Post Neuraxial Block status: Motor and sensory function returned to baseline

## 2019-10-21 LAB
LAB AP CASE REPORT: NORMAL
PATH REPORT.FINAL DX SPEC: NORMAL
PATH REPORT.GROSS SPEC: NORMAL

## 2019-10-25 ENCOUNTER — OFFICE VISIT (OUTPATIENT)
Dept: GASTROENTEROLOGY | Facility: CLINIC | Age: 64
End: 2019-10-25

## 2019-10-25 VITALS
SYSTOLIC BLOOD PRESSURE: 120 MMHG | OXYGEN SATURATION: 97 % | HEIGHT: 65 IN | WEIGHT: 177.6 LBS | DIASTOLIC BLOOD PRESSURE: 74 MMHG | BODY MASS INDEX: 29.59 KG/M2 | HEART RATE: 78 BPM

## 2019-10-25 DIAGNOSIS — R13.19 OTHER DYSPHAGIA: Primary | ICD-10-CM

## 2019-10-25 PROCEDURE — 99213 OFFICE O/P EST LOW 20 MIN: CPT | Performed by: INTERNAL MEDICINE

## 2019-10-25 RX ORDER — PANTOPRAZOLE SODIUM 40 MG/1
40 TABLET, DELAYED RELEASE ORAL DAILY
Qty: 30 TABLET | Refills: 5 | Status: SHIPPED | OUTPATIENT
Start: 2019-10-25 | End: 2020-01-28 | Stop reason: SDUPTHER

## 2019-10-25 RX ORDER — FAMOTIDINE 20 MG/1
20 TABLET, FILM COATED ORAL DAILY
Qty: 30 TABLET | Refills: 4 | Status: SHIPPED | OUTPATIENT
Start: 2019-10-25 | End: 2020-01-28 | Stop reason: SDUPTHER

## 2019-10-25 RX ORDER — SUCRALFATE 1 G/1
1 TABLET ORAL
Qty: 30 TABLET | Refills: 4 | Status: SHIPPED | OUTPATIENT
Start: 2019-10-25 | End: 2020-10-13

## 2019-10-25 NOTE — PATIENT INSTRUCTIONS
Dysphagia    Dysphagia is trouble swallowing. This condition occurs when solids and liquids stick in a person's throat on the way down to the stomach, or when food takes longer to get to the stomach. You may have problems swallowing food, liquids, or both. You may also have pain while trying to swallow. It may take you more time and effort to swallow something.  What are the causes?  This condition is caused by:  · Problems with the muscles. They may make it difficult for you to move food and liquids through the tube that connects your mouth to your stomach (esophagus). You may have ulcers, scar tissue, or inflammation that blocks the normal passage of food and liquids. Causes of these problems include:  ? Acid reflux from your stomach into your esophagus (gastroesophageal reflux).  ? Infections.  ? Radiation treatment for cancer.  ? Medicines taken without enough fluids to wash them down into your stomach.  · Nerve problems. These prevent signals from being sent to the muscles of your esophagus to squeeze (contract) and move what you swallow down to your stomach.  · Globus pharyngeus. This is a common problem that involves feeling like something is stuck in the throat or a sense of trouble with swallowing even though nothing is wrong with the swallowing passages.  · Stroke. This can affect the nerves and make it difficult to swallow.  · Certain conditions, such as cerebral palsy or Parkinson disease.  What are the signs or symptoms?  Common symptoms of this condition include:  · A feeling that solids or liquids are stuck in your throat on the way down to the stomach.  · Food taking too long to get to the stomach.  Other symptoms include:  · Food moving back from your stomach to your mouth (regurgitation).  · Noises coming from your throat.  · Chest discomfort with swallowing.  · A feeling of fullness when swallowing.  · Drooling, especially when the throat is blocked.  · Pain while  swallowing.  · Heartburn.  · Coughing or gagging while trying to swallow.  How is this diagnosed?  This condition is diagnosed by:  · Barium X-ray. In this test, you swallow a white substance (contrast medium)that sticks to the inside of your esophagus. X-ray images are then taken.  · Endoscopy. In this test, a flexible telescope is inserted down your throat to look at your esophagus and your stomach.  · CT scans and MRI.  How is this treated?  Treatment for dysphagia depends on the cause of the condition:  · If the dysphagia is caused by acid reflux or infection, medicines may be used. They may include antibiotics and heartburn medicines.  · If the dysphagia is caused by problems with your muscles, swallowing therapy may be used to help you strengthen your swallowing muscles. You may have to do specific exercises to strengthen the muscles or stretch them.  · If the dysphagia is caused by a blockage or mass, procedures to remove the blockage may be done. You may need surgery and a feeding tube.  You may need to make diet changes. Ask your health care provider for specific instructions.  Follow these instructions at home:  Eating and drinking  · Try to eat soft food that is easier to swallow.  · Follow any diet changes as told by your health care provider.  · Cut your food into small pieces and eat slowly.  · Eat and drink only when you are sitting upright.  · Do not drink alcohol or caffeine. If you need help quitting, ask your health care provider.  General instructions  · Check your weight every day to make sure you are not losing weight.  · Take over-the-counter and prescription medicines only as told by your health care provider.  · If you were prescribed an antibiotic medicine, take it as told by your health care provider. Do not stop taking the antibiotic even if you start to feel better.  · Do not use any products that contain nicotine or tobacco, such as cigarettes and e-cigarettes. If you need help  quitting, ask your health care provider.  · Keep all follow-up visits as told by your health care provider. This is important.  Contact a health care provider if:  · You lose weight because you cannot swallow.  · You cough when you drink liquids (aspiration).  · You cough up partially digested food.  Get help right away if:  · You cannot swallow your saliva.  · You have shortness of breath or a fever, or both.  · You have a hoarse voice and also have trouble swallowing.  Summary  · Dysphagia is trouble swallowing. This condition occurs when solids and liquids stick in a person's throat on the way down to the stomach, or when food takes longer to get to the stomach.  · Dysphagia has many possible causes and symptoms.  · Treatment for dysphagia depends on the cause of the condition.  This information is not intended to replace advice given to you by your health care provider. Make sure you discuss any questions you have with your health care provider.  Document Released: 12/15/2001 Document Revised: 12/07/2017 Document Reviewed: 12/07/2017  [facility] Interactive Patient Education © 2019 [facility] [employer]

## 2019-10-25 NOTE — PROGRESS NOTES
"[facility]      Chief Complaint:   Chief Complaint   Patient presents with   • Other Dysphagia     EGD Performed 10/16/2019       Subjective     HPI:   Patient for follow-up on EGD.  Patient had dilatation of the esophagus during the procedure for stricture and states that dysphagia has markedly improved.  Patient is currently taking a proton pump inhibitor H2 blocker and Carafate.  Patient states marked improvement with these medications.    Plan; outpatient follow-up in 3 months continue current medications.  Discussed with patient that if dysphagia worsens may need to repeat EGD with dilatation.    Past Medical History:   Past Medical History:   Diagnosis Date   • Anesthesia complication     states she \"stopped breathing during her last procedure\"   • Arthritis    • Elevated cholesterol    • Endometriosis    • Falls    • GERD (gastroesophageal reflux disease)    • Osteoporosis    • Scoliosis of thoracolumbar spine    • Seasonal rhinitis    • Sinusitis        Past Surgical History:  Past Surgical History:   Procedure Laterality Date   • APPENDECTOMY     • ENDOSCOPY N/A 10/16/2019    Procedure: ESOPHAGOGASTRODUODENOSCOPY;  Surgeon: Evan Rondon MD;  Location: [facility] ENDOSCOPY;  Service: Gastroenterology   • FOREARM SURGERY     • HYSTERECTOMY     • SALPINGO OOPHORECTOMY     • UPPER GASTROINTESTINAL ENDOSCOPY  10/16/2019   • WRIST FRACTURE SURGERY Right        Family History:  Family History   Problem Relation Age of Onset   • Heart failure Mother    • Thyroid disease Mother    • Ulcerative colitis Father        Social History:   reports that she quit smoking about 17 years ago. She quit after 20.00 years of use. She has never used smokeless tobacco. She reports that she does not drink alcohol or use drugs.    Medications:   Prior to Admission medications    Medication Sig Start Date End Date Taking? Authorizing Provider   acetaminophen-codeine (TYLENOL #3) 300-30 MG per tablet Take 1 " tablet by mouth 2 (Two) Times a Day. 6/26/19   Brad George APRN   albuterol sulfate  (90 Base) MCG/ACT inhaler Inhale 2 puffs Every 4 (Four) Hours As Needed for Wheezing.    Roosevelt Bryant MD   Biotin 1 MG capsule Take  by mouth.    Roosevelt Bryant MD   EPINEPHrine (EPIPEN) 0.3 MG/0.3ML solution auto-injector injection INJECT INTRAMUSCULARLY AS DIRECTED 5/5/17   Roosevelt Bryant MD   famotidine (PEPCID) 20 MG tablet Take 1 tablet by mouth Daily. 10/25/19   Evan Rondon MD   Fexofenadine-Pseudoephedrine (ALLEGRA-D PO) Take 120 mg by mouth 2 (Two) Times a Day As Needed.    Roosevelt Bryant MD   olopatadine (PATANASE) 0.6 % solution nasal solution 2 sprays by Each Nare route 2 (Two) Times a Day. 7/11/18   Alejandro Gonzalez MD   pantoprazole (PROTONIX) 40 MG EC tablet Take 1 tablet by mouth Daily. 10/25/19   Evan Rondon MD   sucralfate (CARAFATE) 1 g tablet Take 1 tablet by mouth every night at bedtime. 10/25/19   Evan Rondon MD   famotidine (PEPCID) 20 MG tablet Take 20 mg by mouth Daily. 11/1/18 10/25/19  Roosevelt Bryant MD   pantoprazole (PROTONIX) 40 MG EC tablet Take 40 mg by mouth Daily. 8/19/19 10/25/19  Roosevelt Bryant MD   sucralfate (CARAFATE) 1 g tablet Take 1 tablet by mouth every night at bedtime. 1/9/19 10/25/19  Alejandro Gonzalez MD       Allergies:  Augmentin [amoxicillin-pot clavulanate] and Sulfa antibiotics    ROS:    Review of Systems   Constitutional: Negative for activity change, appetite change, chills, diaphoresis, fatigue, fever and unexpected weight change.   HENT: Negative for sore throat and trouble swallowing.    Respiratory: Negative for shortness of breath.    Gastrointestinal: Negative for abdominal distention, abdominal pain, anal bleeding, blood in stool, constipation, diarrhea, nausea, rectal pain and vomiting.   Endocrine: Negative for polydipsia, polyphagia and polyuria.   Genitourinary: Negative for difficulty urinating.  "  Musculoskeletal: Negative for arthralgias.   Skin: Negative for pallor.   Allergic/Immunologic: Negative for food allergies.   Neurological: Negative for weakness and light-headedness.   Psychiatric/Behavioral: Negative for behavioral problems.     Objective     Blood pressure 120/74, pulse 78, height 165.1 cm (65\"), weight 80.6 kg (177 lb 9.6 oz), SpO2 97 %.    Physical Exam   Constitutional: She is oriented to person, place, and time. She appears well-developed and well-nourished. No distress.   HENT:   Head: Normocephalic and atraumatic.   Cardiovascular: Normal rate, regular rhythm, normal heart sounds and intact distal pulses. Exam reveals no gallop and no friction rub.   No murmur heard.  Pulmonary/Chest: Breath sounds normal. No respiratory distress. She has no wheezes. She has no rales. She exhibits no tenderness.   Abdominal: Soft. Bowel sounds are normal. She exhibits no distension and no mass. There is no tenderness. There is no rebound and no guarding. No hernia.   Musculoskeletal: Normal range of motion. She exhibits no edema.   Neurological: She is alert and oriented to person, place, and time.   Skin: Skin is warm and dry. No rash noted. She is not diaphoretic. No erythema. No pallor.   Psychiatric: She has a normal mood and affect. Her behavior is normal. Judgment and thought content normal.        Assessment/Plan   Genna was seen today for other dysphagia.    Diagnoses and all orders for this visit:    Other dysphagia    Other orders  -     sucralfate (CARAFATE) 1 g tablet; Take 1 tablet by mouth every night at bedtime.  -     pantoprazole (PROTONIX) 40 MG EC tablet; Take 1 tablet by mouth Daily.  -     famotidine (PEPCID) 20 MG tablet; Take 1 tablet by mouth Daily.        * Surgery not found *     Diagnosis Plan   1. Other dysphagia         Anticipated Surgical Procedure:  No orders of the defined types were placed in this encounter.      The risks, benefits, and alternatives of this procedure " have been discussed with the patient or the responsible party- the patient understands and agrees to proceed.

## 2020-01-07 ENCOUNTER — TELEPHONE (OUTPATIENT)
Dept: FAMILY MEDICINE CLINIC | Facility: CLINIC | Age: 65
End: 2020-01-07

## 2020-01-07 NOTE — TELEPHONE ENCOUNTER
Patient called in requesting an appt with PCP. She stated that she has been coughing up phlegm til she vomits, going on for 4 years, short of breath, will lose her breath, oxygen levels drop, fatigue (appt notes). She is wanting to know if she can be worked in sooner.    Please call at [phone]

## 2020-01-09 ENCOUNTER — TELEPHONE (OUTPATIENT)
Dept: FAMILY MEDICINE CLINIC | Facility: CLINIC | Age: 65
End: 2020-01-09

## 2020-01-09 NOTE — TELEPHONE ENCOUNTER
----- Message from Galina Moody MA sent at 1/9/2020  4:07 PM CST -----  Pt left a message would like for you to call her. [phone]

## 2020-01-14 ENCOUNTER — OFFICE VISIT (OUTPATIENT)
Dept: FAMILY MEDICINE CLINIC | Facility: CLINIC | Age: 65
End: 2020-01-14

## 2020-01-14 VITALS
WEIGHT: 178 LBS | HEIGHT: 65 IN | BODY MASS INDEX: 29.66 KG/M2 | HEART RATE: 95 BPM | OXYGEN SATURATION: 95 % | RESPIRATION RATE: 20 BRPM | TEMPERATURE: 97.8 F | SYSTOLIC BLOOD PRESSURE: 129 MMHG | DIASTOLIC BLOOD PRESSURE: 82 MMHG

## 2020-01-14 DIAGNOSIS — J44.9 CHRONIC OBSTRUCTIVE PULMONARY DISEASE, UNSPECIFIED COPD TYPE (HCC): ICD-10-CM

## 2020-01-14 DIAGNOSIS — R06.02 SHORTNESS OF BREATH: ICD-10-CM

## 2020-01-14 DIAGNOSIS — R05.9 COUGH: Primary | ICD-10-CM

## 2020-01-14 DIAGNOSIS — E66.3 OVERWEIGHT (BMI 25.0-29.9): ICD-10-CM

## 2020-01-14 PROCEDURE — 99214 OFFICE O/P EST MOD 30 MIN: CPT | Performed by: NURSE PRACTITIONER

## 2020-01-14 RX ORDER — ALBUTEROL SULFATE 2.5 MG/3ML
2.5 SOLUTION RESPIRATORY (INHALATION) EVERY 4 HOURS PRN
Qty: 120 ML | Refills: 12 | Status: SHIPPED | OUTPATIENT
Start: 2020-01-14 | End: 2021-09-24

## 2020-01-14 RX ORDER — IPRATROPIUM BROMIDE 42 UG/1
2 SPRAY, METERED NASAL 4 TIMES DAILY
Qty: 120 EACH | Refills: 12 | Status: SHIPPED | OUTPATIENT
Start: 2020-01-14 | End: 2020-10-07

## 2020-01-16 ENCOUNTER — TELEPHONE (OUTPATIENT)
Dept: FAMILY MEDICINE CLINIC | Facility: CLINIC | Age: 65
End: 2020-01-16

## 2020-01-16 NOTE — TELEPHONE ENCOUNTER
Pt stated that [facility] needs a prior auth to fill the:    ipratropium-albuterol (COMBIVENT RESPIMAT)  MCG/ACT inhaler      Genna can be reached at [phone]

## 2020-01-20 ENCOUNTER — TELEPHONE (OUTPATIENT)
Dept: FAMILY MEDICINE CLINIC | Facility: CLINIC | Age: 65
End: 2020-01-20

## 2020-01-20 RX ORDER — CEFUROXIME AXETIL 500 MG/1
500 TABLET ORAL 2 TIMES DAILY
Qty: 20 TABLET | Refills: 0 | Status: SHIPPED | OUTPATIENT
Start: 2020-01-20 | End: 2020-01-28 | Stop reason: SINTOL

## 2020-01-24 NOTE — PATIENT INSTRUCTIONS
Calorie Counting for Weight Loss  Calories are units of energy. Your body needs a certain amount of calories from food to keep you going throughout the day. When you eat more calories than your body needs, your body stores the extra calories as fat. When you eat fewer calories than your body needs, your body burns fat to get the energy it needs.  Calorie counting means keeping track of how many calories you eat and drink each day. Calorie counting can be helpful if you need to lose weight. If you make sure to eat fewer calories than your body needs, you should lose weight. Ask your health care provider what a healthy weight is for you.  For calorie counting to work, you will need to eat the right number of calories in a day in order to lose a healthy amount of weight per week. A dietitian can help you determine how many calories you need in a day and will give you suggestions on how to reach your calorie goal.  · A healthy amount of weight to lose per week is usually 1-2 lb (0.5-0.9 kg). This usually means that your daily calorie intake should be reduced by 500-750 calories.  · Eating 1,200 - 1,500 calories per day can help most women lose weight.  · Eating 1,500 - 1,800 calories per day can help most men lose weight.  What is my plan?  My goal is to have __________ calories per day.  If I have this many calories per day, I should lose around __________ pounds per week.  What do I need to know about calorie counting?  In order to meet your daily calorie goal, you will need to:  · Find out how many calories are in each food you would like to eat. Try to do this before you eat.  · Decide how much of the food you plan to eat.  · Write down what you ate and how many calories it had. Doing this is called keeping a food log.  To successfully lose weight, it is important to balance calorie counting with a healthy lifestyle that includes regular activity. Aim for 150 minutes of moderate exercise (such as walking) or 75  minutes of vigorous exercise (such as running) each week.  Where do I find calorie information?    The number of calories in a food can be found on a Nutrition Facts label. If a food does not have a Nutrition Facts label, try to look up the calories online or ask your dietitian for help.  Remember that calories are listed per serving. If you choose to have more than one serving of a food, you will have to multiply the calories per serving by the amount of servings you plan to eat. For example, the label on a package of bread might say that a serving size is 1 slice and that there are 90 calories in a serving. If you eat 1 slice, you will have eaten 90 calories. If you eat 2 slices, you will have eaten 180 calories.  How do I keep a food log?  Immediately after each meal, record the following information in your food log:  · What you ate. Don't forget to include toppings, sauces, and other extras on the food.  · How much you ate. This can be measured in cups, ounces, or number of items.  · How many calories each food and drink had.  · The total number of calories in the meal.  Keep your food log near you, such as in a small notebook in your pocket, or use a mobile jessica or website. Some programs will calculate calories for you and show you how many calories you have left for the day to meet your goal.  What are some calorie counting tips?    · Use your calories on foods and drinks that will fill you up and not leave you hungry:  ? Some examples of foods that fill you up are nuts and nut butters, vegetables, lean proteins, and high-fiber foods like whole grains. High-fiber foods are foods with more than 5 g fiber per serving.  ? Drinks such as sodas, specialty coffee drinks, alcohol, and juices have a lot of calories, yet do not fill you up.  · Eat nutritious foods and avoid empty calories. Empty calories are calories you get from foods or beverages that do not have many vitamins or protein, such as candy, sweets, and  "soda. It is better to have a nutritious high-calorie food (such as an avocado) than a food with few nutrients (such as a bag of chips).  · Know how many calories are in the foods you eat most often. This will help you calculate calorie counts faster.  · Pay attention to calories in drinks. Low-calorie drinks include water and unsweetened drinks.  · Pay attention to nutrition labels for \"low fat\" or \"fat free\" foods. These foods sometimes have the same amount of calories or more calories than the full fat versions. They also often have added sugar, starch, or salt, to make up for flavor that was removed with the fat.  · Find a way of tracking calories that works for you. Get creative. Try different apps or programs if writing down calories does not work for you.  What are some portion control tips?  · Know how many calories are in a serving. This will help you know how many servings of a certain food you can have.  · Use a measuring cup to measure serving sizes. You could also try weighing out portions on a kitchen scale. With time, you will be able to estimate serving sizes for some foods.  · Take some time to put servings of different foods on your favorite plates, bowls, and cups so you know what a serving looks like.  · Try not to eat straight from a bag or box. Doing this can lead to overeating. Put the amount you would like to eat in a cup or on a plate to make sure you are eating the right portion.  · Use smaller plates, glasses, and bowls to prevent overeating.  · Try not to multitask (for example, watch TV or use your computer) while eating. If it is time to eat, sit down at a table and enjoy your food. This will help you to know when you are full. It will also help you to be aware of what you are eating and how much you are eating.  What are tips for following this plan?  Reading food labels  · Check the calorie count compared to the serving size. The serving size may be smaller than what you are used to " "eating.  · Check the source of the calories. Make sure the food you are eating is high in vitamins and protein and low in saturated and trans fats.  Shopping  · Read nutrition labels while you shop. This will help you make healthy decisions before you decide to purchase your food.  · Make a grocery list and stick to it.  Cooking  · Try to cook your favorite foods in a healthier way. For example, try baking instead of frying.  · Use low-fat dairy products.  Meal planning  · Use more fruits and vegetables. Half of your plate should be fruits and vegetables.  · Include lean proteins like poultry and fish.  How do I count calories when eating out?  · Ask for smaller portion sizes.  · Consider sharing an entree and sides instead of getting your own entree.  · If you get your own entree, eat only half. Ask for a box at the beginning of your meal and put the rest of your entree in it so you are not tempted to eat it.  · If calories are listed on the menu, choose the lower calorie options.  · Choose dishes that include vegetables, fruits, whole grains, low-fat dairy products, and lean protein.  · Choose items that are boiled, broiled, grilled, or steamed. Stay away from items that are buttered, battered, fried, or served with cream sauce. Items labeled \"crispy\" are usually fried, unless stated otherwise.  · Choose water, low-fat milk, unsweetened iced tea, or other drinks without added sugar. If you want an alcoholic beverage, choose a lower calorie option such as a glass of wine or light beer.  · Ask for dressings, sauces, and syrups on the side. These are usually high in calories, so you should limit the amount you eat.  · If you want a salad, choose a garden salad and ask for grilled meats. Avoid extra toppings like betancourt, cheese, or fried items. Ask for the dressing on the side, or ask for olive oil and vinegar or lemon to use as dressing.  · Estimate how many servings of a food you are given. For example, a serving of " cooked rice is ½ cup or about the size of half a baseball. Knowing serving sizes will help you be aware of how much food you are eating at restaurants. The list below tells you how big or small some common portion sizes are based on everyday objects:  ? 1 oz--4 stacked dice.  ? 3 oz--1 deck of cards.  ? 1 tsp--1 die.  ? 1 Tbsp--½ a ping-pong ball.  ? 2 Tbsp--1 ping-pong ball.  ? ½ cup--½ baseball.  ? 1 cup--1 baseball.  Summary  · Calorie counting means keeping track of how many calories you eat and drink each day. If you eat fewer calories than your body needs, you should lose weight.  · A healthy amount of weight to lose per week is usually 1-2 lb (0.5-0.9 kg). This usually means reducing your daily calorie intake by 500-750 calories.  · The number of calories in a food can be found on a Nutrition Facts label. If a food does not have a Nutrition Facts label, try to look up the calories online or ask your dietitian for help.  · Use your calories on foods and drinks that will fill you up, and not on foods and drinks that will leave you hungry.  · Use smaller plates, glasses, and bowls to prevent overeating.  This information is not intended to replace advice given to you by your health care provider. Make sure you discuss any questions you have with your health care provider.  Document Released: 12/18/2006 Document Revised: 09/06/2019 Document Reviewed: 11/17/2017  [employer] Interactive Patient Education © 2019 [employer]      Exercising to Lose Weight  Exercise is structured, repetitive physical activity to improve fitness and health. Getting regular exercise is important for everyone. It is especially important if you are overweight. Being overweight increases your risk of heart disease, stroke, diabetes, high blood pressure, and several types of cancer. Reducing your calorie intake and exercising can help you lose weight.  Exercise is usually categorized as moderate or vigorous intensity. To lose weight, most  people need to do a certain amount of moderate-intensity or vigorous-intensity exercise each week.  Moderate-intensity exercise    Moderate-intensity exercise is any activity that gets you moving enough to burn at least three times more energy (calories) than if you were sitting.  Examples of moderate exercise include:  · Walking a mile in 15 minutes.  · Doing light yard work.  · Biking at an easy pace.  Most people should get at least 150 minutes (2 hours and 30 minutes) a week of moderate-intensity exercise to maintain their body weight.  Vigorous-intensity exercise  Vigorous-intensity exercise is any activity that gets you moving enough to burn at least six times more calories than if you were sitting. When you exercise at this intensity, you should be working hard enough that you are not able to carry on a conversation.  Examples of vigorous exercise include:  · Running.  · Playing a team sport, such as football, basketball, and soccer.  · Jumping rope.  Most people should get at least 75 minutes (1 hour and 15 minutes) a week of vigorous-intensity exercise to maintain their body weight.  How can exercise affect me?  When you exercise enough to burn more calories than you eat, you lose weight. Exercise also reduces body fat and builds muscle. The more muscle you have, the more calories you burn. Exercise also:  · Improves mood.  · Reduces stress and tension.  · Improves your overall fitness, flexibility, and endurance.  · Increases bone strength.  The amount of exercise you need to lose weight depends on:  · Your age.  · The type of exercise.  · Any health conditions you have.  · Your overall physical ability.  Talk to your health care provider about how much exercise you need and what types of activities are safe for you.  What actions can I take to lose weight?  Nutrition    · Make changes to your diet as told by your health care provider or diet and nutrition specialist (dietitian). This may  include:  ? Eating fewer calories.  ? Eating more protein.  ? Eating less unhealthy fats.  ? Eating a diet that includes fresh fruits and vegetables, whole grains, low-fat dairy products, and lean protein.  ? Avoiding foods with added fat, salt, and sugar.  · Drink plenty of water while you exercise to prevent dehydration or heat stroke.  Activity  · Choose an activity that you enjoy and set realistic goals. Your health care provider can help you make an exercise plan that works for you.  · Exercise at a moderate or vigorous intensity most days of the week.  ? The intensity of exercise may vary from person to person. You can tell how intense a workout is for you by paying attention to your breathing and heartbeat. Most people will notice their breathing and heartbeat get faster with more intense exercise.  · Do resistance training twice each week, such as:  ? Push-ups.  ? Sit-ups.  ? Lifting weights.  ? Using resistance bands.  · Getting short amounts of exercise can be just as helpful as long structured periods of exercise. If you have trouble finding time to exercise, try to include exercise in your daily routine.  ? Get up, stretch, and walk around every 30 minutes throughout the day.  ? Go for a walk during your lunch break.  ? Park your car farther away from your destination.  ? If you take public transportation, get off one stop early and walk the rest of the way.  ? Make phone calls while standing up and walking around.  ? Take the stairs instead of elevators or escalators.  · Wear comfortable clothes and shoes with good support.  · Do not exercise so much that you hurt yourself, feel dizzy, or get very short of breath.  Where to find more information  · [place] Department of Health and Human Services: www.hhs.gov  · Centers for Disease Control and Prevention (CDC): www.cdc.gov  Contact a health care provider:  · Before starting a new exercise program.  · If you have questions or concerns about your  weight.  · If you have a medical problem that keeps you from exercising.  Get help right away if you have any of the following while exercising:  · Injury.  · Dizziness.  · Difficulty breathing or shortness of breath that does not go away when you stop exercising.  · Chest pain.  · Rapid heartbeat.  Summary  · Being overweight increases your risk of heart disease, stroke, diabetes, high blood pressure, and several types of cancer.  · Losing weight happens when you burn more calories than you eat.  · Reducing the amount of calories you eat in addition to getting regular moderate or vigorous exercise each week helps you lose weight.  This information is not intended to replace advice given to you by your health care provider. Make sure you discuss any questions you have with your health care provider.  Document Released: 01/20/2012 Document Revised: 12/31/2018 Document Reviewed: 12/31/2018  [employer] Patient Education © 2019 [employer]

## 2020-01-24 NOTE — PROGRESS NOTES
Subjective   Genna Garcia is a 65 y.o. female.     Here today with copd issues.  She has been seeing dr dixon for this problem.  She says she has had a cough for the past 3 yrs.  Is worse in the am and the pm.  She reports the cough to be productive and foamy.    Chest xrays done at [facility] done by dr dixon reports chronic changes with basilar atelectasis/scarring.  Ct of her chest done in 2018 confirmed the chest x ray findings.  She is a former smoker.    Cough   This is a chronic problem. The current episode started more than 1 year ago. The problem has been unchanged. The problem occurs every few minutes. The cough is productive of sputum. Associated symptoms include shortness of breath. Pertinent negatives include no chest pain, chills, ear congestion, ear pain, fever, headaches, heartburn, hemoptysis, myalgias, nasal congestion, postnasal drip, rash, rhinorrhea, sore throat, sweats, weight loss or wheezing. The symptoms are aggravated by lying down. The treatment provided moderate relief. Her past medical history is significant for COPD and emphysema. There is no history of asthma, bronchiectasis, bronchitis, environmental allergies or pneumonia.        The following portions of the patient's history were reviewed and updated as appropriate: allergies, current medications, past family history, past medical history, past social history, past surgical history and problem list.    Review of Systems   Constitutional: Negative.  Negative for chills, fever and unexpected weight loss.   HENT: Negative.  Negative for ear pain, postnasal drip, rhinorrhea and sore throat.    Eyes: Negative.    Respiratory: Positive for cough and shortness of breath. Negative for hemoptysis and wheezing.    Cardiovascular: Negative.  Negative for chest pain.   Gastrointestinal: Negative.    Endocrine: Negative.    Genitourinary: Negative.    Musculoskeletal: Negative.  Negative for myalgias.   Skin: Negative.  Negative for  rash.   Allergic/Immunologic: Negative.  Negative for environmental allergies.   Neurological: Negative.    Hematological: Negative.    Psychiatric/Behavioral: Negative.        Objective   Physical Exam   Constitutional: She is oriented to person, place, and time. She appears well-developed and well-nourished. No distress.   HENT:   Head: Normocephalic and atraumatic.   Right Ear: External ear normal.   Left Ear: External ear normal.   Nose: Nose normal.   Mouth/Throat: Oropharynx is clear and moist. No oropharyngeal exudate.   Eyes: Pupils are equal, round, and reactive to light.   Neck: Normal range of motion. Neck supple. No thyromegaly present.   Cardiovascular: Normal rate, regular rhythm and normal heart sounds. Exam reveals no friction rub.   No murmur heard.  Pulmonary/Chest: Effort normal and breath sounds normal. No respiratory distress. She has no wheezes. She has no rales.   Chest xray is repeated today and shows chronic stable changes.   Abdominal: Soft.   Musculoskeletal: Normal range of motion.   Neurological: She is alert and oriented to person, place, and time.   Skin: Skin is warm and dry.   Psychiatric: She has a normal mood and affect. Thought content normal.   Nursing note and vitals reviewed.        Assessment/Plan   Genna was seen today for cough and nasal congestion.    Diagnoses and all orders for this visit:    Cough  -     XR Chest PA & Lateral (In Office)    Shortness of breath  -     XR Chest PA & Lateral (In Office)    Chronic obstructive pulmonary disease, unspecified COPD type (CMS/[facility])  -     Home Nebulizer Accessories  -     Home Nebulizer  -     albuterol (PROVENTIL) (2.5 MG/3ML) 0.083% nebulizer solution; Take 2.5 mg by nebulization Every 4 (Four) Hours As Needed for Wheezing.  -     ipratropium (ATROVENT) 0.06 % nasal spray; 2 sprays into the nostril(s) as directed by provider 4 (Four) Times a Day.  -     ipratropium-albuterol (COMBIVENT RESPIMAT)  MCG/ACT inhaler; Inhale 1  puff 4 (Four) Times a Day As Needed for Shortness of Air.  -     Ambulatory Referral to Pulmonology    Overweight (BMI 25.0-29.9)  Comments:  diet and exercise info given

## 2020-01-28 ENCOUNTER — OFFICE VISIT (OUTPATIENT)
Dept: GASTROENTEROLOGY | Facility: CLINIC | Age: 65
End: 2020-01-28

## 2020-01-28 VITALS
BODY MASS INDEX: 29.96 KG/M2 | HEIGHT: 65 IN | WEIGHT: 179.8 LBS | SYSTOLIC BLOOD PRESSURE: 129 MMHG | DIASTOLIC BLOOD PRESSURE: 66 MMHG | HEART RATE: 97 BPM

## 2020-01-28 DIAGNOSIS — R13.19 OTHER DYSPHAGIA: Primary | ICD-10-CM

## 2020-01-28 PROCEDURE — 99213 OFFICE O/P EST LOW 20 MIN: CPT | Performed by: INTERNAL MEDICINE

## 2020-01-28 RX ORDER — PANTOPRAZOLE SODIUM 40 MG/1
40 TABLET, DELAYED RELEASE ORAL DAILY
Qty: 30 TABLET | Refills: 5 | Status: SHIPPED | OUTPATIENT
Start: 2020-01-28 | End: 2020-11-30 | Stop reason: SDUPTHER

## 2020-01-28 RX ORDER — FAMOTIDINE 20 MG/1
20 TABLET, FILM COATED ORAL DAILY
Qty: 30 TABLET | Refills: 4 | Status: SHIPPED | OUTPATIENT
Start: 2020-01-28 | End: 2020-03-20 | Stop reason: RX

## 2020-01-28 NOTE — PROGRESS NOTES
"[facility]      Chief Complaint:   Chief Complaint   Patient presents with   • Difficulty Swallowing       Subjective     HPI:   Patient for follow-up on dysphasia.  Patient had an EGD in October states her dysphasia is markedly improved.  Patient does state peanut butter is sticking when she eats it but otherwise not having symptoms.  Patient is currently taking a proton pump inhibitor and H2 blocker marked relief of symptoms.  Discussed with patient that her dysphasia may return if it does she should return for repeat EGD with dilatation.  Patient had a colonoscopy in 2016 which she states was normal patient will be due for another colonoscopy in 2021.    Plan; continue patient on proton pump inhibitor and H2 blocker follow-up in 6 months    Past Medical History:   Past Medical History:   Diagnosis Date   • Anesthesia complication     states she \"stopped breathing during her last procedure\"   • Arthritis    • Elevated cholesterol    • Endometriosis    • Falls    • GERD (gastroesophageal reflux disease)    • Osteoporosis    • Scoliosis of thoracolumbar spine    • Seasonal rhinitis    • Sinusitis        Past Surgical History:  Past Surgical History:   Procedure Laterality Date   • APPENDECTOMY     • COLONOSCOPY     • ENDOSCOPY N/A 10/16/2019    Procedure: ESOPHAGOGASTRODUODENOSCOPY;  Surgeon: Evan Rondon MD;  Location: [facility] ENDOSCOPY;  Service: Gastroenterology   • FOREARM SURGERY     • HYSTERECTOMY     • SALPINGO OOPHORECTOMY     • UPPER GASTROINTESTINAL ENDOSCOPY  10/16/2019   • WRIST FRACTURE SURGERY Right        Family History:  Family History   Problem Relation Age of Onset   • Heart failure Mother    • Thyroid disease Mother    • Ulcerative colitis Father        Social History:   reports that she quit smoking about 18 years ago. She quit after 20.00 years of use. She has never used smokeless tobacco. She reports that she does not drink alcohol or use drugs.    Medications: "   Prior to Admission medications    Medication Sig Start Date End Date Taking? Authorizing Provider   acetaminophen-codeine (TYLENOL #3) 300-30 MG per tablet Take 1 tablet by mouth 2 (Two) Times a Day. 6/26/19  Yes Brad George APRN   albuterol (PROVENTIL) (2.5 MG/3ML) 0.083% nebulizer solution Take 2.5 mg by nebulization Every 4 (Four) Hours As Needed for Wheezing. 1/14/20  Yes Jayleen Maier APRN   albuterol sulfate  (90 Base) MCG/ACT inhaler Inhale 2 puffs Every 4 (Four) Hours As Needed for Wheezing.   Yes Provider, MD Roosevelt   Biotin 1 MG capsule Take  by mouth.   Yes Provider, MD Roosevelt   EPINEPHrine (EPIPEN) 0.3 MG/0.3ML solution auto-injector injection INJECT INTRAMUSCULARLY AS DIRECTED 5/5/17  Yes ProviderRoosevelt MD   famotidine (PEPCID) 20 MG tablet Take 1 tablet by mouth Daily. 1/28/20  Yes Evan Rondon MD   Fexofenadine-Pseudoephedrine (ALLEGRA-D PO) Take 120 mg by mouth 2 (Two) Times a Day As Needed.   Yes ProviderRoosevelt MD   ipratropium (ATROVENT) 0.06 % nasal spray 2 sprays into the nostril(s) as directed by provider 4 (Four) Times a Day. 1/14/20  Yes Jayleen Maier APRN   ipratropium-albuterol (COMBIVENT RESPIMAT)  MCG/ACT inhaler Inhale 1 puff 4 (Four) Times a Day As Needed for Shortness of Air. 1/14/20  Yes Jayleen Maier APRN   olopatadine (PATANASE) 0.6 % solution nasal solution 2 sprays by Each Nare route 2 (Two) Times a Day. 7/11/18  Yes Alejandro Gonzalez MD   pantoprazole (PROTONIX) 40 MG EC tablet Take 1 tablet by mouth Daily. 1/28/20  Yes Evan Rondon MD   sucralfate (CARAFATE) 1 g tablet Take 1 tablet by mouth every night at bedtime. 10/25/19  Yes Evan Rondon MD   famotidine (PEPCID) 20 MG tablet Take 1 tablet by mouth Daily. 10/25/19 1/28/20 Yes Evan Rondon MD   pantoprazole (PROTONIX) 40 MG EC tablet Take 1 tablet by mouth Daily. 10/25/19 1/28/20 Yes Evan Rondon MD   cefuroxime (CEFTIN) 500 MG tablet Take 1 tablet by  "mouth 2 (Two) Times a Day. 1/20/20 1/28/20  Jayleen Maier, MARCELLA       Allergies:  Augmentin [amoxicillin-pot clavulanate] and Sulfa antibiotics    ROS:    Review of Systems   Constitutional: Negative for activity change, appetite change, chills, diaphoresis, fatigue, fever and unexpected weight change.   HENT: Negative for sore throat and trouble swallowing.    Respiratory: Negative for shortness of breath.    Gastrointestinal: Negative for abdominal distention, abdominal pain, anal bleeding, blood in stool, constipation, diarrhea, nausea, rectal pain and vomiting.   Endocrine: Negative for polydipsia, polyphagia and polyuria.   Genitourinary: Negative for difficulty urinating.   Musculoskeletal: Negative for arthralgias.   Skin: Negative for pallor.   Allergic/Immunologic: Negative for food allergies.   Neurological: Negative for weakness and light-headedness.   Psychiatric/Behavioral: Negative for behavioral problems.     Objective     Blood pressure 129/66, pulse 97, height 165.1 cm (65\"), weight 81.6 kg (179 lb 12.8 oz).    Physical Exam   Constitutional: She is oriented to person, place, and time. She appears well-developed and well-nourished. No distress.   HENT:   Head: Normocephalic and atraumatic.   Cardiovascular: Normal rate, regular rhythm, normal heart sounds and intact distal pulses. Exam reveals no gallop and no friction rub.   No murmur heard.  Pulmonary/Chest: Breath sounds normal. No respiratory distress. She has no wheezes. She has no rales. She exhibits no tenderness.   Abdominal: Soft. Bowel sounds are normal. She exhibits no distension and no mass. There is no tenderness. There is no rebound and no guarding. No hernia.   Musculoskeletal: Normal range of motion. She exhibits no edema.   Neurological: She is alert and oriented to person, place, and time.   Skin: Skin is warm and dry. No rash noted. She is not diaphoretic. No erythema. No pallor.   Psychiatric: She has a normal mood and " affect. Her behavior is normal. Judgment and thought content normal.        Assessment/Plan   Genna was seen today for difficulty swallowing.    Diagnoses and all orders for this visit:    Other dysphagia    Other orders  -     pantoprazole (PROTONIX) 40 MG EC tablet; Take 1 tablet by mouth Daily.  -     famotidine (PEPCID) 20 MG tablet; Take 1 tablet by mouth Daily.        * Surgery not found *     Diagnosis Plan   1. Other dysphagia         Anticipated Surgical Procedure:  No orders of the defined types were placed in this encounter.      The risks, benefits, and alternatives of this procedure have been discussed with the patient or the responsible party- the patient understands and agrees to proceed.

## 2020-01-28 NOTE — PATIENT INSTRUCTIONS

## 2020-02-24 ENCOUNTER — OFFICE VISIT (OUTPATIENT)
Dept: FAMILY MEDICINE CLINIC | Facility: CLINIC | Age: 65
End: 2020-02-24

## 2020-02-24 VITALS
HEIGHT: 65 IN | HEART RATE: 93 BPM | BODY MASS INDEX: 30.16 KG/M2 | SYSTOLIC BLOOD PRESSURE: 127 MMHG | WEIGHT: 181 LBS | OXYGEN SATURATION: 94 % | DIASTOLIC BLOOD PRESSURE: 78 MMHG | RESPIRATION RATE: 20 BRPM | TEMPERATURE: 99.8 F

## 2020-02-24 DIAGNOSIS — B02.9 HERPES ZOSTER WITHOUT COMPLICATION: Primary | ICD-10-CM

## 2020-02-24 DIAGNOSIS — E66.9 OBESITY (BMI 30.0-34.9): ICD-10-CM

## 2020-02-24 PROCEDURE — 99214 OFFICE O/P EST MOD 30 MIN: CPT | Performed by: NURSE PRACTITIONER

## 2020-02-24 RX ORDER — VALACYCLOVIR HYDROCHLORIDE 1 G/1
1000 TABLET, FILM COATED ORAL DAILY
Qty: 10 TABLET | Refills: 0 | Status: SHIPPED | OUTPATIENT
Start: 2020-02-24 | End: 2020-03-13 | Stop reason: SDUPTHER

## 2020-02-24 RX ORDER — TRAMADOL HYDROCHLORIDE 50 MG/1
50 TABLET ORAL EVERY 6 HOURS PRN
Qty: 30 TABLET | Refills: 0 | Status: SHIPPED | OUTPATIENT
Start: 2020-02-24 | End: 2021-02-05

## 2020-03-02 NOTE — PATIENT INSTRUCTIONS
Calorie Counting for Weight Loss  Calories are units of energy. Your body needs a certain amount of calories from food to keep you going throughout the day. When you eat more calories than your body needs, your body stores the extra calories as fat. When you eat fewer calories than your body needs, your body burns fat to get the energy it needs.  Calorie counting means keeping track of how many calories you eat and drink each day. Calorie counting can be helpful if you need to lose weight. If you make sure to eat fewer calories than your body needs, you should lose weight. Ask your health care provider what a healthy weight is for you.  For calorie counting to work, you will need to eat the right number of calories in a day in order to lose a healthy amount of weight per week. A dietitian can help you determine how many calories you need in a day and will give you suggestions on how to reach your calorie goal.  · A healthy amount of weight to lose per week is usually 1-2 lb (0.5-0.9 kg). This usually means that your daily calorie intake should be reduced by 500-750 calories.  · Eating 1,200 - 1,500 calories per day can help most women lose weight.  · Eating 1,500 - 1,800 calories per day can help most men lose weight.  What is my plan?  My goal is to have __________ calories per day.  If I have this many calories per day, I should lose around __________ pounds per week.  What do I need to know about calorie counting?  In order to meet your daily calorie goal, you will need to:  · Find out how many calories are in each food you would like to eat. Try to do this before you eat.  · Decide how much of the food you plan to eat.  · Write down what you ate and how many calories it had. Doing this is called keeping a food log.  To successfully lose weight, it is important to balance calorie counting with a healthy lifestyle that includes regular activity. Aim for 150 minutes of moderate exercise (such as walking) or 75  minutes of vigorous exercise (such as running) each week.  Where do I find calorie information?    The number of calories in a food can be found on a Nutrition Facts label. If a food does not have a Nutrition Facts label, try to look up the calories online or ask your dietitian for help.  Remember that calories are listed per serving. If you choose to have more than one serving of a food, you will have to multiply the calories per serving by the amount of servings you plan to eat. For example, the label on a package of bread might say that a serving size is 1 slice and that there are 90 calories in a serving. If you eat 1 slice, you will have eaten 90 calories. If you eat 2 slices, you will have eaten 180 calories.  How do I keep a food log?  Immediately after each meal, record the following information in your food log:  · What you ate. Don't forget to include toppings, sauces, and other extras on the food.  · How much you ate. This can be measured in cups, ounces, or number of items.  · How many calories each food and drink had.  · The total number of calories in the meal.  Keep your food log near you, such as in a small notebook in your pocket, or use a mobile jessica or website. Some programs will calculate calories for you and show you how many calories you have left for the day to meet your goal.  What are some calorie counting tips?    · Use your calories on foods and drinks that will fill you up and not leave you hungry:  ? Some examples of foods that fill you up are nuts and nut butters, vegetables, lean proteins, and high-fiber foods like whole grains. High-fiber foods are foods with more than 5 g fiber per serving.  ? Drinks such as sodas, specialty coffee drinks, alcohol, and juices have a lot of calories, yet do not fill you up.  · Eat nutritious foods and avoid empty calories. Empty calories are calories you get from foods or beverages that do not have many vitamins or protein, such as candy, sweets, and  "soda. It is better to have a nutritious high-calorie food (such as an avocado) than a food with few nutrients (such as a bag of chips).  · Know how many calories are in the foods you eat most often. This will help you calculate calorie counts faster.  · Pay attention to calories in drinks. Low-calorie drinks include water and unsweetened drinks.  · Pay attention to nutrition labels for \"low fat\" or \"fat free\" foods. These foods sometimes have the same amount of calories or more calories than the full fat versions. They also often have added sugar, starch, or salt, to make up for flavor that was removed with the fat.  · Find a way of tracking calories that works for you. Get creative. Try different apps or programs if writing down calories does not work for you.  What are some portion control tips?  · Know how many calories are in a serving. This will help you know how many servings of a certain food you can have.  · Use a measuring cup to measure serving sizes. You could also try weighing out portions on a kitchen scale. With time, you will be able to estimate serving sizes for some foods.  · Take some time to put servings of different foods on your favorite plates, bowls, and cups so you know what a serving looks like.  · Try not to eat straight from a bag or box. Doing this can lead to overeating. Put the amount you would like to eat in a cup or on a plate to make sure you are eating the right portion.  · Use smaller plates, glasses, and bowls to prevent overeating.  · Try not to multitask (for example, watch TV or use your computer) while eating. If it is time to eat, sit down at a table and enjoy your food. This will help you to know when you are full. It will also help you to be aware of what you are eating and how much you are eating.  What are tips for following this plan?  Reading food labels  · Check the calorie count compared to the serving size. The serving size may be smaller than what you are used to " "eating.  · Check the source of the calories. Make sure the food you are eating is high in vitamins and protein and low in saturated and trans fats.  Shopping  · Read nutrition labels while you shop. This will help you make healthy decisions before you decide to purchase your food.  · Make a grocery list and stick to it.  Cooking  · Try to cook your favorite foods in a healthier way. For example, try baking instead of frying.  · Use low-fat dairy products.  Meal planning  · Use more fruits and vegetables. Half of your plate should be fruits and vegetables.  · Include lean proteins like poultry and fish.  How do I count calories when eating out?  · Ask for smaller portion sizes.  · Consider sharing an entree and sides instead of getting your own entree.  · If you get your own entree, eat only half. Ask for a box at the beginning of your meal and put the rest of your entree in it so you are not tempted to eat it.  · If calories are listed on the menu, choose the lower calorie options.  · Choose dishes that include vegetables, fruits, whole grains, low-fat dairy products, and lean protein.  · Choose items that are boiled, broiled, grilled, or steamed. Stay away from items that are buttered, battered, fried, or served with cream sauce. Items labeled \"crispy\" are usually fried, unless stated otherwise.  · Choose water, low-fat milk, unsweetened iced tea, or other drinks without added sugar. If you want an alcoholic beverage, choose a lower calorie option such as a glass of wine or light beer.  · Ask for dressings, sauces, and syrups on the side. These are usually high in calories, so you should limit the amount you eat.  · If you want a salad, choose a garden salad and ask for grilled meats. Avoid extra toppings like betancourt, cheese, or fried items. Ask for the dressing on the side, or ask for olive oil and vinegar or lemon to use as dressing.  · Estimate how many servings of a food you are given. For example, a serving of " cooked rice is ½ cup or about the size of half a baseball. Knowing serving sizes will help you be aware of how much food you are eating at restaurants. The list below tells you how big or small some common portion sizes are based on everyday objects:  ? 1 oz--4 stacked dice.  ? 3 oz--1 deck of cards.  ? 1 tsp--1 die.  ? 1 Tbsp--½ a ping-pong ball.  ? 2 Tbsp--1 ping-pong ball.  ? ½ cup--½ baseball.  ? 1 cup--1 baseball.  Summary  · Calorie counting means keeping track of how many calories you eat and drink each day. If you eat fewer calories than your body needs, you should lose weight.  · A healthy amount of weight to lose per week is usually 1-2 lb (0.5-0.9 kg). This usually means reducing your daily calorie intake by 500-750 calories.  · The number of calories in a food can be found on a Nutrition Facts label. If a food does not have a Nutrition Facts label, try to look up the calories online or ask your dietitian for help.  · Use your calories on foods and drinks that will fill you up, and not on foods and drinks that will leave you hungry.  · Use smaller plates, glasses, and bowls to prevent overeating.  This information is not intended to replace advice given to you by your health care provider. Make sure you discuss any questions you have with your health care provider.  Document Released: 12/18/2006 Document Revised: 09/06/2019 Document Reviewed: 11/17/2017  [employer] Patient Education © 2020 [employer]      Exercising to Lose Weight  Exercise is structured, repetitive physical activity to improve fitness and health. Getting regular exercise is important for everyone. It is especially important if you are overweight. Being overweight increases your risk of heart disease, stroke, diabetes, high blood pressure, and several types of cancer. Reducing your calorie intake and exercising can help you lose weight.  Exercise is usually categorized as moderate or vigorous intensity. To lose weight, most  people need to do a certain amount of moderate-intensity or vigorous-intensity exercise each week.  Moderate-intensity exercise    Moderate-intensity exercise is any activity that gets you moving enough to burn at least three times more energy (calories) than if you were sitting.  Examples of moderate exercise include:  · Walking a mile in 15 minutes.  · Doing light yard work.  · Biking at an easy pace.  Most people should get at least 150 minutes (2 hours and 30 minutes) a week of moderate-intensity exercise to maintain their body weight.  Vigorous-intensity exercise  Vigorous-intensity exercise is any activity that gets you moving enough to burn at least six times more calories than if you were sitting. When you exercise at this intensity, you should be working hard enough that you are not able to carry on a conversation.  Examples of vigorous exercise include:  · Running.  · Playing a team sport, such as football, basketball, and soccer.  · Jumping rope.  Most people should get at least 75 minutes (1 hour and 15 minutes) a week of vigorous-intensity exercise to maintain their body weight.  How can exercise affect me?  When you exercise enough to burn more calories than you eat, you lose weight. Exercise also reduces body fat and builds muscle. The more muscle you have, the more calories you burn. Exercise also:  · Improves mood.  · Reduces stress and tension.  · Improves your overall fitness, flexibility, and endurance.  · Increases bone strength.  The amount of exercise you need to lose weight depends on:  · Your age.  · The type of exercise.  · Any health conditions you have.  · Your overall physical ability.  Talk to your health care provider about how much exercise you need and what types of activities are safe for you.  What actions can I take to lose weight?  Nutrition    · Make changes to your diet as told by your health care provider or diet and nutrition specialist (dietitian). This may  include:  ? Eating fewer calories.  ? Eating more protein.  ? Eating less unhealthy fats.  ? Eating a diet that includes fresh fruits and vegetables, whole grains, low-fat dairy products, and lean protein.  ? Avoiding foods with added fat, salt, and sugar.  · Drink plenty of water while you exercise to prevent dehydration or heat stroke.  Activity  · Choose an activity that you enjoy and set realistic goals. Your health care provider can help you make an exercise plan that works for you.  · Exercise at a moderate or vigorous intensity most days of the week.  ? The intensity of exercise may vary from person to person. You can tell how intense a workout is for you by paying attention to your breathing and heartbeat. Most people will notice their breathing and heartbeat get faster with more intense exercise.  · Do resistance training twice each week, such as:  ? Push-ups.  ? Sit-ups.  ? Lifting weights.  ? Using resistance bands.  · Getting short amounts of exercise can be just as helpful as long structured periods of exercise. If you have trouble finding time to exercise, try to include exercise in your daily routine.  ? Get up, stretch, and walk around every 30 minutes throughout the day.  ? Go for a walk during your lunch break.  ? Park your car farther away from your destination.  ? If you take public transportation, get off one stop early and walk the rest of the way.  ? Make phone calls while standing up and walking around.  ? Take the stairs instead of elevators or escalators.  · Wear comfortable clothes and shoes with good support.  · Do not exercise so much that you hurt yourself, feel dizzy, or get very short of breath.  Where to find more information  · [place] Department of Health and Human Services: www.hhs.gov  · Centers for Disease Control and Prevention (CDC): www.cdc.gov  Contact a health care provider:  · Before starting a new exercise program.  · If you have questions or concerns about your  weight.  · If you have a medical problem that keeps you from exercising.  Get help right away if you have any of the following while exercising:  · Injury.  · Dizziness.  · Difficulty breathing or shortness of breath that does not go away when you stop exercising.  · Chest pain.  · Rapid heartbeat.  Summary  · Being overweight increases your risk of heart disease, stroke, diabetes, high blood pressure, and several types of cancer.  · Losing weight happens when you burn more calories than you eat.  · Reducing the amount of calories you eat in addition to getting regular moderate or vigorous exercise each week helps you lose weight.  This information is not intended to replace advice given to you by your health care provider. Make sure you discuss any questions you have with your health care provider.  Document Released: 01/20/2012 Document Revised: 12/31/2018 Document Reviewed: 12/31/2018  [employer] Interactive Patient Education © 2020 [employer]

## 2020-03-02 NOTE — PROGRESS NOTES
Subjective   Genna Garcia is a 65 y.o. female.     Here today thinking that she might have shingles on her abd.  She first noticed blisters and pain about 2 days ago.  Has used no meds for sx.    Rash   The problem is unchanged. The affected locations include the abdomen. The rash is characterized by blistering and pain. She was exposed to nothing. Pertinent negatives include no anorexia, congestion, cough, diarrhea, eye pain, facial edema, fatigue, fever, joint pain, nail changes, rhinorrhea, shortness of breath, sore throat or vomiting. Past treatments include nothing. The treatment provided no relief.        The following portions of the patient's history were reviewed and updated as appropriate: allergies, current medications, past family history, past medical history, past social history, past surgical history and problem list.    Review of Systems   Constitutional: Negative.  Negative for fatigue and fever.   HENT: Negative.  Negative for congestion, rhinorrhea and sore throat.    Eyes: Negative.  Negative for pain.   Respiratory: Negative.  Negative for cough and shortness of breath.    Cardiovascular: Negative.    Gastrointestinal: Negative.  Negative for anorexia, diarrhea and vomiting.   Endocrine: Negative.    Genitourinary: Negative.    Musculoskeletal: Negative.  Negative for joint pain.   Skin: Positive for rash. Negative for nail changes.   Allergic/Immunologic: Negative.    Neurological: Negative.    Hematological: Negative.    Psychiatric/Behavioral: Negative.        Objective   Physical Exam   Constitutional: She is oriented to person, place, and time. She appears well-developed and well-nourished. No distress.   HENT:   Head: Normocephalic and atraumatic.   Mouth/Throat: No oropharyngeal exudate.   Eyes: Pupils are equal, round, and reactive to light.   Neck: Normal range of motion. Neck supple. No thyromegaly present.   Cardiovascular: Normal rate, regular rhythm and normal heart  sounds. Exam reveals no friction rub.   No murmur heard.  Pulmonary/Chest: Effort normal and breath sounds normal. No respiratory distress. She has no wheezes. She has no rales.   Abdominal: Soft.   Musculoskeletal: Normal range of motion.   Neurological: She is alert and oriented to person, place, and time.   Skin: Skin is warm and dry.   She has a group of blisters on abd.  Base is erythematous.   Psychiatric: She has a normal mood and affect. Thought content normal.   Nursing note and vitals reviewed.        Assessment/Plan   Genna was seen today for skin lesion.    Diagnoses and all orders for this visit:    Herpes zoster without complication  -     valACYclovir (VALTREX) 1000 MG tablet; Take 1 tablet by mouth Daily.  -     traMADol (ULTRAM) 50 MG tablet; Take 1 tablet by mouth Every 6 (Six) Hours As Needed for Moderate Pain .    Obesity (BMI 30.0-34.9)  Comments:  diet and exercise info given

## 2020-03-13 ENCOUNTER — TELEPHONE (OUTPATIENT)
Dept: FAMILY MEDICINE CLINIC | Facility: CLINIC | Age: 65
End: 2020-03-13

## 2020-03-13 DIAGNOSIS — B02.9 HERPES ZOSTER WITHOUT COMPLICATION: ICD-10-CM

## 2020-03-13 RX ORDER — VALACYCLOVIR HYDROCHLORIDE 1 G/1
1000 TABLET, FILM COATED ORAL DAILY
Qty: 10 TABLET | Refills: 0 | Status: SHIPPED | OUTPATIENT
Start: 2020-03-13 | End: 2020-10-13

## 2020-03-13 NOTE — TELEPHONE ENCOUNTER
PT called, states she was seen by PCP and diagnosed with shingles last month (2/24/20). PT started Valtrex 1000 MG, reports the rash/blistering was almost completely gone and then she finished the script. Since finishing the script the shingles have returned. PT would like PCP to call in something else to combat shingles symptoms before the weekend if possible.     Confirmed Pharmacy: [id number] [address] - [phone]  - [phone] FX    Please call Genna with any questions/concerns: [phone]

## 2020-03-20 ENCOUNTER — TELEPHONE (OUTPATIENT)
Dept: GASTROENTEROLOGY | Facility: CLINIC | Age: 65
End: 2020-03-20

## 2020-03-20 RX ORDER — RANITIDINE 300 MG/1
300 TABLET ORAL DAILY
Qty: 30 TABLET | Refills: 4 | Status: SHIPPED | OUTPATIENT
Start: 2020-03-20 | End: 2020-10-13

## 2020-03-20 NOTE — TELEPHONE ENCOUNTER
I called and left a VM stating that we had called in another med as she requested.    Reason for call:  I sent in ZANTAC 300 per DR Rondon

## 2020-04-03 ENCOUNTER — TELEPHONE (OUTPATIENT)
Dept: FAMILY MEDICINE CLINIC | Facility: CLINIC | Age: 65
End: 2020-04-03

## 2020-04-03 DIAGNOSIS — M41.9 SCOLIOSIS OF THORACOLUMBAR SPINE, UNSPECIFIED SCOLIOSIS TYPE: ICD-10-CM
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
## 2020-04-03 RX ORDER — FEXOFENADINE HCL AND PSEUDOEPHEDRINE HCI 60; 120 MG/1; MG/1
1 TABLET, EXTENDED RELEASE ORAL 2 TIMES DAILY
Qty: 60 TABLET | Refills: 5 | Status: SHIPPED | OUTPATIENT
Start: 2020-04-03 | End: 2020-10-21 | Stop reason: ALTCHOICE

## 2020-04-03 NOTE — TELEPHONE ENCOUNTER
PATIENT IS REQUESTING A REFILL ON   acetaminophen-codeine (TYLENOL #3) 300-30 MG per tablet  1 tablet, 2 Times Daily      AND SHE IS REQUESTING ALLEGRA D 1PILL FOR 12 HOURS       GOOD CONTACT NUMBER   [phone]        VERIFIED PHARMACY  [facility] #[id number] - [place], [place] - [address] - [phone] PH - [phone] FX

## 2020-04-06 RX ORDER — ACETAMINOPHEN AND CODEINE PHOSPHATE 300; 30 MG/1; MG/1
1 TABLET ORAL 2 TIMES DAILY
Qty: 60 TABLET | Refills: 0 | Status: SHIPPED | OUTPATIENT
Start: 2020-04-06 | End: 2021-02-05

## 2020-05-18 DIAGNOSIS — J44.9 CHRONIC OBSTRUCTIVE PULMONARY DISEASE, UNSPECIFIED COPD TYPE (HCC): Primary | ICD-10-CM

## 2020-05-19 ENCOUNTER — OFFICE VISIT (OUTPATIENT)
Dept: PULMONOLOGY | Facility: CLINIC | Age: 65
End: 2020-05-19

## 2020-05-19 ENCOUNTER — APPOINTMENT (OUTPATIENT)
Dept: LAB | Facility: HOSPITAL | Age: 65
End: 2020-05-19

## 2020-05-19 ENCOUNTER — HOSPITAL ENCOUNTER (OUTPATIENT)
Dept: GENERAL RADIOLOGY | Facility: HOSPITAL | Age: 65
Discharge: HOME | End: 2020-05-19
Admitting: INTERNAL MEDICINE

## 2020-05-19 VITALS
WEIGHT: 181.8 LBS | BODY MASS INDEX: 30.29 KG/M2 | SYSTOLIC BLOOD PRESSURE: 130 MMHG | HEART RATE: 95 BPM | DIASTOLIC BLOOD PRESSURE: 80 MMHG | OXYGEN SATURATION: 98 % | HEIGHT: 65 IN

## 2020-05-19 DIAGNOSIS — J98.11 PULMONARY ATELECTASIS: Primary | ICD-10-CM

## 2020-05-19 DIAGNOSIS — J44.9 CHRONIC OBSTRUCTIVE PULMONARY DISEASE, UNSPECIFIED COPD TYPE (HCC): ICD-10-CM

## 2020-05-19 DIAGNOSIS — J42 CHRONIC BRONCHITIS, UNSPECIFIED CHRONIC BRONCHITIS TYPE (HCC): ICD-10-CM

## 2020-05-19 PROBLEM — J01.90 ACUTE SINUSITIS: Status: ACTIVE | Noted: 2020-05-19

## 2020-05-19 PROBLEM — M25.511 PAIN IN JOINT OF RIGHT SHOULDER: Status: ACTIVE | Noted: 2018-04-09

## 2020-05-19 PROBLEM — B37.31 CANDIDIASIS OF VAGINA: Status: ACTIVE | Noted: 2020-05-19

## 2020-05-19 PROBLEM — J06.9 ACUTE UPPER RESPIRATORY INFECTION: Status: ACTIVE | Noted: 2020-05-19

## 2020-05-19 LAB — CHROMATIN AB SERPL-ACNC: <10 IU/ML (ref 0–14)

## 2020-05-19 PROCEDURE — 86431 RHEUMATOID FACTOR QUANT: CPT | Performed by: INTERNAL MEDICINE

## 2020-05-19 PROCEDURE — 86038 ANTINUCLEAR ANTIBODIES: CPT | Performed by: INTERNAL MEDICINE

## 2020-05-19 PROCEDURE — 96372 THER/PROPH/DIAG INJ SC/IM: CPT | Performed by: INTERNAL MEDICINE

## 2020-05-19 PROCEDURE — 36415 COLL VENOUS BLD VENIPUNCTURE: CPT | Performed by: INTERNAL MEDICINE

## 2020-05-19 PROCEDURE — 83520 IMMUNOASSAY QUANT NOS NONAB: CPT | Performed by: INTERNAL MEDICINE

## 2020-05-19 PROCEDURE — 86225 DNA ANTIBODY NATIVE: CPT | Performed by: INTERNAL MEDICINE

## 2020-05-19 PROCEDURE — 86256 FLUORESCENT ANTIBODY TITER: CPT | Performed by: INTERNAL MEDICINE

## 2020-05-19 PROCEDURE — 99204 OFFICE O/P NEW MOD 45 MIN: CPT | Performed by: INTERNAL MEDICINE

## 2020-05-19 PROCEDURE — 71046 X-RAY EXAM CHEST 2 VIEWS: CPT

## 2020-05-19 PROCEDURE — 82164 ANGIOTENSIN I ENZYME TEST: CPT | Performed by: INTERNAL MEDICINE

## 2020-05-19 RX ORDER — METHYLPREDNISOLONE ACETATE 40 MG/ML
80 INJECTION, SUSPENSION INTRA-ARTICULAR; INTRALESIONAL; INTRAMUSCULAR; SOFT TISSUE ONCE
Status: COMPLETED | OUTPATIENT
Start: 2020-05-19 | End: 2020-05-19

## 2020-05-19 RX ADMIN — METHYLPREDNISOLONE ACETATE 80 MG: 40 INJECTION, SUSPENSION INTRA-ARTICULAR; INTRALESIONAL; INTRAMUSCULAR; SOFT TISSUE at 13:53

## 2020-05-19 NOTE — PROGRESS NOTES
Genna Garcia is a 65 y.o. female.     Chief Complaint   Patient presents with   • COPD         History of Present Illness   This 65-year-old lady has had breathing problems for few years which is getting progressively worse.  She has had a history of severe GERD.  She also has had her esophagus stretched a number of occasions.  She complains of cough wheezing dyspnea on minimal exertion and clear sputum production.  She denies chest pain or hemoptysis.  She has had pneumonia several times but denies asthma.  She is a former smoker.  Surgical history salpingo-oophorectomy and appendectomy, hysterectomy, surgery right arm and wrist.  Medications please see her list.  Medication allergies sulfa Augmentin and cefuroxime.  Family history is positive for multiple illnesses.  Social history smoked 3 packs a day for 18 years.      The following portions of the patient's history were reviewed and updated as appropriate: allergies, current medications, past family history, past medical history, past social history, past surgical history and problem list.      Review of Systems   Constitutional: Negative for activity change, chills, fatigue, fever and unexpected weight change.   HENT: Positive for hearing loss. Negative for congestion, dental problem, ear discharge, ear pain, facial swelling, nosebleeds, postnasal drip, rhinorrhea, sinus pressure, sneezing, sore throat, tinnitus, trouble swallowing and voice change.    Eyes: Negative.  Negative for photophobia, pain, discharge, redness, itching and visual disturbance.   Respiratory: Positive for cough, chest tightness, shortness of breath and wheezing.    Cardiovascular: Negative for chest pain, palpitations and leg swelling.   Gastrointestinal: Negative for abdominal distention, abdominal pain and vomiting.   Endocrine: Negative.  Negative for cold intolerance, heat intolerance, polydipsia and polyphagia.   Genitourinary: Negative.  Negative for decreased  "urine volume, dysuria, enuresis, flank pain, frequency, hematuria and urgency.   Musculoskeletal: Positive for arthralgias and back pain. Negative for gait problem, joint swelling, myalgias and neck pain.   Skin: Negative for color change, pallor, rash and wound.   Allergic/Immunologic: Negative.  Negative for environmental allergies, food allergies and immunocompromised state.   Neurological: Negative.  Negative for dizziness, tremors, seizures, syncope, facial asymmetry, speech difficulty, weakness, light-headedness, numbness and headaches.   Hematological: Negative for adenopathy. Does not bruise/bleed easily.   Psychiatric/Behavioral: Negative for agitation, behavioral problems, confusion, decreased concentration, hallucinations and self-injury. The patient is not hyperactive.    All other systems reviewed and are negative.      /80   Pulse 95   Ht 165.1 cm (65\")   Wt 82.5 kg (181 lb 12.8 oz)   SpO2 98%   BMI 30.25 kg/m²   Physical Exam   Constitutional: She appears well-developed and well-nourished. She appears distressed ( Dyspneic white female with cough).   HENT:   Head: Normocephalic.   Mouth/Throat: No oropharyngeal exudate.   Eyes: Pupils are equal, round, and reactive to light. Conjunctivae are normal. Right eye exhibits no discharge. Left eye exhibits no discharge. No scleral icterus.   Neck: Normal range of motion. Neck supple. No JVD present. No tracheal deviation present. No thyromegaly present.   Cardiovascular: Normal rate, regular rhythm, normal heart sounds and intact distal pulses. Exam reveals no gallop and no friction rub.   No murmur heard.  Pulmonary/Chest: Effort normal and breath sounds normal. No respiratory distress ( Severe rales in both lung bases with diminished breath sounds in the bases). She has no wheezes. She has no rales. She exhibits no tenderness.   Abdominal: Bowel sounds are normal. She exhibits no distension and no mass. There is no tenderness. There is no " guarding.   Musculoskeletal: She exhibits no tenderness or deformity.   Lymphadenopathy:     She has no cervical adenopathy.   Neurological: She is alert. She has normal reflexes. She displays normal reflexes. No cranial nerve deficit. She exhibits normal muscle tone. Coordination normal.   Skin: Skin is warm and dry. No rash noted. She is not diaphoretic. No pallor.   Psychiatric: She has a normal mood and affect. Her behavior is normal. Judgment and thought content normal.     Chest x-ray reveals bibasilar atelectasis and a streaky infiltrate in the right lower lung field    Assessment/Plan   Genna was seen today for copd.    Diagnoses and all orders for this visit:    Pulmonary atelectasis  -     CT Chest Without Contrast; Future  -     THAIS  -     ANCA Panel  -     Rheumatoid Factor  -     Angiotensin Converting Enzyme  -     methylPREDNISolone acetate (DEPO-medrol) injection 80 mg    Chronic bronchitis, unspecified chronic bronchitis type (CMS/HCC)  -     CT Chest Without Contrast; Future  -     THAIS  -     ANCA Panel  -     Rheumatoid Factor  -     Angiotensin Converting Enzyme  -     methylPREDNISolone acetate (DEPO-medrol) injection 80 mg      Assessment bibasilar lung disease with suspicion for chronic aspiration from GERD.    Plan repeat CT, blood work, Depo-Medrol, Trelegy inhaler, return a few days after CT        This document has been produced with the assistance of Dragon dictation  This document has been electronically signed by Lennox Dahl MD on May 19, 2020 13:44

## 2020-05-20 LAB
ACE SERPL-CCNC: 22 U/L (ref 14–82)
ANA SER QL: POSITIVE
DSDNA AB SER-ACNC: 1 IU/ML (ref 0–9)
Lab: NORMAL

## 2020-05-21 LAB
C-ANCA TITR SER IF: NORMAL TITER
MYELOPEROXIDASE AB SER-ACNC: <9 U/ML (ref 0–9)
P-ANCA ATYPICAL TITR SER IF: NORMAL TITER
P-ANCA TITR SER IF: NORMAL TITER
PROTEINASE3 AB SER IA-ACNC: <3.5 U/ML (ref 0–3.5)

## 2020-05-29 ENCOUNTER — HOSPITAL ENCOUNTER (OUTPATIENT)
Dept: CT IMAGING | Facility: HOSPITAL | Age: 65
Discharge: HOME | End: 2020-05-29
Admitting: INTERNAL MEDICINE

## 2020-05-29 DIAGNOSIS — J42 CHRONIC BRONCHITIS, UNSPECIFIED CHRONIC BRONCHITIS TYPE (HCC): ICD-10-CM

## 2020-05-29 DIAGNOSIS — J98.11 PULMONARY ATELECTASIS: ICD-10-CM

## 2020-05-29 PROCEDURE — 71250 CT THORAX DX C-: CPT

## 2020-06-02 ENCOUNTER — OFFICE VISIT (OUTPATIENT)
Dept: PULMONOLOGY | Facility: CLINIC | Age: 65
End: 2020-06-02

## 2020-06-02 VITALS
BODY MASS INDEX: 30.39 KG/M2 | HEIGHT: 65 IN | SYSTOLIC BLOOD PRESSURE: 115 MMHG | HEART RATE: 86 BPM | OXYGEN SATURATION: 96 % | WEIGHT: 182.4 LBS | DIASTOLIC BLOOD PRESSURE: 60 MMHG

## 2020-06-02 DIAGNOSIS — J84.10 PULMONARY FIBROSIS (HCC): Primary | ICD-10-CM

## 2020-06-02 PROCEDURE — 99213 OFFICE O/P EST LOW 20 MIN: CPT | Performed by: INTERNAL MEDICINE

## 2020-06-02 RX ORDER — PREDNISONE 20 MG/1
20 TABLET ORAL DAILY
Qty: 30 TABLET | Refills: 0 | Status: SHIPPED | OUTPATIENT
Start: 2020-06-02 | End: 2020-10-13

## 2020-06-02 RX ORDER — FLUTICASONE PROPIONATE 50 MCG
2 SPRAY, SUSPENSION (ML) NASAL DAILY
COMMUNITY
End: 2020-10-07

## 2020-06-02 RX ORDER — FAMOTIDINE 20 MG/1
20 TABLET, FILM COATED ORAL DAILY
COMMUNITY
Start: 2020-05-26 | End: 2020-11-17 | Stop reason: SDUPTHER

## 2020-06-02 NOTE — PROGRESS NOTES
"This lady continues to have cough and dyspnea.  Her CT revealed pulmonary fibrosis and her THAIS was mildly positive.  She does not have any arthritis.  The rest of her laboratory including rheumatoid factor angiotensin-converting enzyme and THAIS were negative    The following portions of the patient's history were reviewed and updated as appropriate: current medications, past family history, past medical history, past social history, past surgical history and problem list.      ROS    Constitutional-no night sweats weight loss headaches  GI no abdominal pain nausea or diarrhea  Neuro no seizure or neurologic deficits  Musculoskeletal no deformity or joint pain   no dysuria or hematuria  Skin no rash or other lesions  All other systems reviewed and were negative except for the above.      Physical Exam  /60   Pulse 86   Ht 165.1 cm (65\")   Wt 82.7 kg (182 lb 6.4 oz)   SpO2 96%   BMI 30.35 kg/m²   Vital signs as above  Pupils equally round and reactive to light and accommodation, neck no JVD or adenopathy.  Cardiovascular regular rhythm and rate no murmur or gallop.  Abdomen soft no organomegaly tenderness.  Extremities no clubbing cyanosis or edema.  No cervical adenopathy.  No skin rash.  Neurologic good strength bilaterally without deficits  Lungs reveal a few rales bilaterally    Impression pulmonary fibrosis etiology?  With mildly positive THAIS    Plan lung biopsy was discussed.  The patient would rather try medications first.  Since her symptoms are shortness of breath and increased mucus production we will try a short course of prednisone prior to considering methotrexate    Return in 3 weeks        This document has been produced with the assistance of Dragon dictation  This document has been electronically signed by Lennox Dahl MD on June 2, 2020 14:00      "

## 2020-06-23 ENCOUNTER — OFFICE VISIT (OUTPATIENT)
Dept: GASTROENTEROLOGY | Facility: CLINIC | Age: 65
End: 2020-06-23

## 2020-06-23 VITALS
HEART RATE: 94 BPM | OXYGEN SATURATION: 97 % | HEIGHT: 65 IN | SYSTOLIC BLOOD PRESSURE: 140 MMHG | WEIGHT: 182.8 LBS | DIASTOLIC BLOOD PRESSURE: 80 MMHG | BODY MASS INDEX: 30.46 KG/M2

## 2020-06-23 DIAGNOSIS — R19.4 CHANGE IN BOWEL HABITS: Primary | ICD-10-CM

## 2020-06-23 DIAGNOSIS — R13.19 OTHER DYSPHAGIA: ICD-10-CM

## 2020-06-23 PROCEDURE — 99214 OFFICE O/P EST MOD 30 MIN: CPT | Performed by: INTERNAL MEDICINE

## 2020-06-23 RX ORDER — DEXTROSE AND SODIUM CHLORIDE 5; .45 G/100ML; G/100ML
30 INJECTION, SOLUTION INTRAVENOUS CONTINUOUS PRN
Status: CANCELLED | OUTPATIENT
Start: 2020-06-30

## 2020-06-23 RX ORDER — SODIUM, POTASSIUM,MAG SULFATES 17.5-3.13G
1 SOLUTION, RECONSTITUTED, ORAL ORAL EVERY 12 HOURS
Qty: 1 BOTTLE | Refills: 0 | Status: SHIPPED | OUTPATIENT
Start: 2020-06-23 | End: 2020-10-13

## 2020-06-23 RX ORDER — DICYCLOMINE HCL 20 MG
20 TABLET ORAL
Qty: 90 TABLET | Refills: 5 | Status: SHIPPED | OUTPATIENT
Start: 2020-06-23 | End: 2020-07-23

## 2020-06-23 NOTE — PATIENT INSTRUCTIONS

## 2020-06-23 NOTE — PROGRESS NOTES
"[facility]      Chief Complaint:   Chief Complaint   Patient presents with   • Irritable Bowel Syndrome   • Follow-up       Subjective     HPI:   Patient with change in bowel habits with burning with bowel movements.  Patient also with epigastric abdominal pain and dysphasia.  Patient has had EGD with dilatation before and states she is having trouble swallowing again.  Patient states that when she has a bowel movement it burns like her stool is on fire.    Plan; schedule patient for EGD and colonoscopy to evaluate the cause of this.    Past Medical History:   Past Medical History:   Diagnosis Date   • Anesthesia complication     states she \"stopped breathing during her last procedure\"   • Arthritis    • Elevated cholesterol    • Endometriosis    • Falls    • GERD (gastroesophageal reflux disease)    • Osteoporosis    • Scoliosis of thoracolumbar spine    • Seasonal rhinitis    • Sinusitis        Past Surgical History:  Past Surgical History:   Procedure Laterality Date   • APPENDECTOMY     • COLONOSCOPY     • ENDOSCOPY N/A 10/16/2019    Procedure: ESOPHAGOGASTRODUODENOSCOPY;  Surgeon: Evan Rondon MD;  Location: [facility] ENDOSCOPY;  Service: Gastroenterology   • FOREARM SURGERY     • HYSTERECTOMY     • SALPINGO OOPHORECTOMY     • UPPER GASTROINTESTINAL ENDOSCOPY  10/16/2019   • WRIST FRACTURE SURGERY Right        Family History:  Family History   Problem Relation Age of Onset   • Heart failure Mother    • Thyroid disease Mother    • Ulcerative colitis Father        Social History:   reports that she quit smoking about 18 years ago. She quit after 20.00 years of use. She has never used smokeless tobacco. She reports that she does not drink alcohol or use drugs.    Medications:   Prior to Admission medications    Medication Sig Start Date End Date Taking? Authorizing Provider   acetaminophen-codeine (TYLENOL #3) 300-30 MG per tablet Take 1 tablet by mouth 2 (Two) Times a Day. 4/6/20  Yes " Jayleen Maier APRN   albuterol (PROVENTIL) (2.5 MG/3ML) 0.083% nebulizer solution Take 2.5 mg by nebulization Every 4 (Four) Hours As Needed for Wheezing. 1/14/20  Yes Jayleen Maier APRN   albuterol sulfate  (90 Base) MCG/ACT inhaler Inhale 2 puffs Every 4 (Four) Hours As Needed for Wheezing.   Yes Provider, MD Roosevelt   Biotin 1 MG capsule Take  by mouth.   Yes Provider, MD Roosevelt   EPINEPHrine (EPIPEN) 0.3 MG/0.3ML solution auto-injector injection INJECT INTRAMUSCULARLY AS DIRECTED 5/5/17  Yes ProviderRoosevelt MD   famotidine (PEPCID) 20 MG tablet Take 20 mg by mouth Daily. 5/26/20  Yes ProviderRoosevelt MD   fexofenadine-pseudoephedrine (ALLEGRA-D)  MG per 12 hr tablet Take 1 tablet by mouth 2 (Two) Times a Day. 4/3/20  Yes Jayleen Maier APRN   fluticasone (FLONASE) 50 MCG/ACT nasal spray 2 sprays into the nostril(s) as directed by provider Daily.   Yes ProviderRoosevelt MD   ipratropium (ATROVENT) 0.06 % nasal spray 2 sprays into the nostril(s) as directed by provider 4 (Four) Times a Day. 1/14/20  Yes Jayleen Maier APRN   ipratropium-albuterol (COMBIVENT RESPIMAT)  MCG/ACT inhaler Inhale 1 puff 4 (Four) Times a Day As Needed for Shortness of Air. 1/14/20  Yes Jayleen Maier APRN   olopatadine (PATANASE) 0.6 % solution nasal solution 2 sprays by Each Nare route 2 (Two) Times a Day. 7/11/18  Yes Alejandro Gonzalez MD   pantoprazole (PROTONIX) 40 MG EC tablet Take 1 tablet by mouth Daily. 1/28/20  Yes Evan Rondon MD   predniSONE (Deltasone) 20 MG tablet Take 1 tablet by mouth Daily. 6/2/20  Yes Lennox Dahl MD   raNITIdine (ZANTAC) 300 MG tablet Take 1 tablet by mouth Daily. 3/20/20  Yes Evan Rondon MD   sucralfate (CARAFATE) 1 g tablet Take 1 tablet by mouth every night at bedtime. 10/25/19  Yes Evan Rondon MD   traMADol (ULTRAM) 50 MG tablet Take 1 tablet by mouth Every 6 (Six) Hours As Needed for Moderate Pain . 2/24/20  Yes  "Jayleen Maier APRN   valACYclovir (VALTREX) 1000 MG tablet Take 1 tablet by mouth Daily. 3/13/20  Yes Jayleen Maier APRN   dicyclomine (BENTYL) 20 MG tablet Take 1 tablet by mouth 4 (Four) Times a Day Before Meals & at Bedtime As Needed (cramping) for up to 30 days. 6/23/20 7/23/20  Evan Rondon MD   sodium-potassium-magnesium sulfates (SUPREP) 17.5-3.13-1.6 GM/177ML solution oral solution Take 1 bottle by mouth Every 12 (Twelve) Hours. 6/23/20   Evan Rondon MD       Allergies:  Sulfa antibiotics; Augmentin [amoxicillin-pot clavulanate]; and Cefuroxime    ROS:    Review of Systems   Constitutional: Negative for activity change, appetite change, chills, diaphoresis, fatigue, fever and unexpected weight change.   HENT: Negative for sore throat and trouble swallowing.    Respiratory: Negative for shortness of breath.    Gastrointestinal: Negative for abdominal distention, abdominal pain, anal bleeding, blood in stool, constipation, diarrhea, nausea, rectal pain and vomiting.   Endocrine: Negative for polydipsia, polyphagia and polyuria.   Genitourinary: Negative for difficulty urinating.   Musculoskeletal: Negative for arthralgias.   Skin: Negative for pallor.   Allergic/Immunologic: Negative for food allergies.   Neurological: Negative for weakness and light-headedness.   Psychiatric/Behavioral: Negative for behavioral problems.     Objective     Blood pressure 140/80, pulse 94, height 165.1 cm (65\"), weight 82.9 kg (182 lb 12.8 oz), SpO2 97 %.    Physical Exam   Constitutional: She is oriented to person, place, and time. She appears well-developed and well-nourished. No distress.   HENT:   Head: Normocephalic and atraumatic.   Cardiovascular: Normal rate, regular rhythm, normal heart sounds and intact distal pulses. Exam reveals no gallop and no friction rub.   No murmur heard.  Pulmonary/Chest: Breath sounds normal. No respiratory distress. She has no wheezes. She has no rales. She exhibits no " tenderness.   Abdominal: Soft. Bowel sounds are normal. She exhibits no distension and no mass. There is no tenderness. There is no rebound and no guarding. No hernia.   Musculoskeletal: Normal range of motion. She exhibits no edema.   Neurological: She is alert and oriented to person, place, and time.   Skin: Skin is warm and dry. No rash noted. She is not diaphoretic. No erythema. No pallor.   Psychiatric: She has a normal mood and affect. Her behavior is normal. Judgment and thought content normal.        Assessment/Plan   Genna was seen today for irritable bowel syndrome and follow-up.    Diagnoses and all orders for this visit:    Change in bowel habits  -     Case Request; Standing  -     Case Request    Other dysphagia  -     Case Request; Standing  -     Case Request    Other orders  -     Follow Anesthesia Guidelines / Standing Orders; Future  -     Obtain Informed Consent; Future  -     sodium-potassium-magnesium sulfates (SUPREP) 17.5-3.13-1.6 GM/177ML solution oral solution; Take 1 bottle by mouth Every 12 (Twelve) Hours.  -     dicyclomine (BENTYL) 20 MG tablet; Take 1 tablet by mouth 4 (Four) Times a Day Before Meals & at Bedtime As Needed (cramping) for up to 30 days.        ESOPHAGOGASTRODUODENOSCOPY (N/A), COLONOSCOPY (N/A)     Diagnosis Plan   1. Change in bowel habits  Case Request    dextrose 5 % and sodium chloride 0.45 % infusion    Case Request   2. Other dysphagia  Case Request    dextrose 5 % and sodium chloride 0.45 % infusion    Case Request       Anticipated Surgical Procedure:  Orders Placed This Encounter   Procedures   • Follow Anesthesia Guidelines / Standing Orders     Standing Status:   Future   • Obtain Informed Consent     Standing Status:   Future     Order Specific Question:   Informed Consent Given For     Answer:   egd and colonoscopy       The risks, benefits, and alternatives of this procedure have been discussed with the patient or the responsible party- the patient  understands and agrees to proceed.

## 2020-06-27 PROCEDURE — U0003 INFECTIOUS AGENT DETECTION BY NUCLEIC ACID (DNA OR RNA); SEVERE ACUTE RESPIRATORY SYNDROME CORONAVIRUS 2 (SARS-COV-2) (CORONAVIRUS DISEASE [COVID-19]), AMPLIFIED PROBE TECHNIQUE, MAKING USE OF HIGH THROUGHPUT TECHNOLOGIES AS DESCRIBED BY CMS-2020-01-R: HCPCS | Performed by: INTERNAL MEDICINE

## 2020-06-27 PROCEDURE — C9803 HOPD COVID-19 SPEC COLLECT: HCPCS | Performed by: INTERNAL MEDICINE

## 2020-06-28 LAB
COVID LABCORP PRIORITY: NORMAL
SARS-COV-2 RNA RESP QL NAA+PROBE: NOT DETECTED

## 2020-06-30 ENCOUNTER — ANESTHESIA (OUTPATIENT)
Dept: GASTROENTEROLOGY | Facility: HOSPITAL | Age: 65
End: 2020-06-30

## 2020-06-30 ENCOUNTER — ANESTHESIA EVENT (OUTPATIENT)
Dept: GASTROENTEROLOGY | Facility: HOSPITAL | Age: 65
End: 2020-06-30

## 2020-06-30 ENCOUNTER — HOSPITAL ENCOUNTER (OUTPATIENT)
Facility: HOSPITAL | Age: 65
Setting detail: HOSPITAL OUTPATIENT SURGERY
Discharge: HOME | End: 2020-06-30
Attending: INTERNAL MEDICINE | Admitting: INTERNAL MEDICINE

## 2020-06-30 VITALS
OXYGEN SATURATION: 97 % | HEIGHT: 65 IN | WEIGHT: 176 LBS | TEMPERATURE: 98.5 F | HEART RATE: 92 BPM | SYSTOLIC BLOOD PRESSURE: 131 MMHG | RESPIRATION RATE: 18 BRPM | BODY MASS INDEX: 29.32 KG/M2 | DIASTOLIC BLOOD PRESSURE: 66 MMHG

## 2020-06-30 DIAGNOSIS — R13.19 OTHER DYSPHAGIA: ICD-10-CM

## 2020-06-30 DIAGNOSIS — R19.4 CHANGE IN BOWEL HABITS: ICD-10-CM

## 2020-06-30 PROCEDURE — 43248 EGD GUIDE WIRE INSERTION: CPT | Performed by: INTERNAL MEDICINE

## 2020-06-30 PROCEDURE — 25010000002 PROPOFOL 10 MG/ML EMULSION: Performed by: NURSE ANESTHETIST, CERTIFIED REGISTERED

## 2020-06-30 PROCEDURE — 45380 COLONOSCOPY AND BIOPSY: CPT | Performed by: INTERNAL MEDICINE

## 2020-06-30 PROCEDURE — 43239 EGD BIOPSY SINGLE/MULTIPLE: CPT | Performed by: INTERNAL MEDICINE

## 2020-06-30 RX ORDER — DEXTROSE AND SODIUM CHLORIDE 5; .45 G/100ML; G/100ML
30 INJECTION, SOLUTION INTRAVENOUS CONTINUOUS PRN
Status: DISCONTINUED | OUTPATIENT
Start: 2020-06-30 | End: 2020-06-30 | Stop reason: HOSPADM

## 2020-06-30 RX ORDER — PROMETHAZINE HYDROCHLORIDE 25 MG/1
25 TABLET ORAL ONCE AS NEEDED
Status: DISCONTINUED | OUTPATIENT
Start: 2020-06-30 | End: 2020-06-30 | Stop reason: HOSPADM

## 2020-06-30 RX ORDER — PROMETHAZINE HYDROCHLORIDE 25 MG/ML
12.5 INJECTION, SOLUTION INTRAMUSCULAR; INTRAVENOUS ONCE AS NEEDED
Status: DISCONTINUED | OUTPATIENT
Start: 2020-06-30 | End: 2020-06-30 | Stop reason: HOSPADM

## 2020-06-30 RX ORDER — ONDANSETRON 2 MG/ML
4 INJECTION INTRAMUSCULAR; INTRAVENOUS ONCE AS NEEDED
Status: DISCONTINUED | OUTPATIENT
Start: 2020-06-30 | End: 2020-06-30 | Stop reason: HOSPADM

## 2020-06-30 RX ORDER — LIDOCAINE HYDROCHLORIDE 20 MG/ML
INJECTION, SOLUTION INTRAVENOUS AS NEEDED
Status: DISCONTINUED | OUTPATIENT
Start: 2020-06-30 | End: 2020-06-30 | Stop reason: SURG

## 2020-06-30 RX ORDER — PROMETHAZINE HYDROCHLORIDE 25 MG/1
25 SUPPOSITORY RECTAL ONCE AS NEEDED
Status: DISCONTINUED | OUTPATIENT
Start: 2020-06-30 | End: 2020-06-30 | Stop reason: HOSPADM

## 2020-06-30 RX ORDER — PROPOFOL 10 MG/ML
VIAL (ML) INTRAVENOUS AS NEEDED
Status: DISCONTINUED | OUTPATIENT
Start: 2020-06-30 | End: 2020-06-30 | Stop reason: SURG

## 2020-06-30 RX ORDER — MEPERIDINE HYDROCHLORIDE 25 MG/ML
12.5 INJECTION INTRAMUSCULAR; INTRAVENOUS; SUBCUTANEOUS
Status: DISCONTINUED | OUTPATIENT
Start: 2020-06-30 | End: 2020-06-30 | Stop reason: HOSPADM

## 2020-06-30 RX ADMIN — PROPOFOL 10 MG: 10 INJECTION, EMULSION INTRAVENOUS at 14:34

## 2020-06-30 RX ADMIN — PROPOFOL 50 MG: 10 INJECTION, EMULSION INTRAVENOUS at 14:30

## 2020-06-30 RX ADMIN — LIDOCAINE HYDROCHLORIDE 100 MG: 20 INJECTION, SOLUTION INTRAVENOUS at 14:25

## 2020-06-30 RX ADMIN — PROPOFOL 50 MG: 10 INJECTION, EMULSION INTRAVENOUS at 14:28

## 2020-06-30 RX ADMIN — PROPOFOL 80 MG: 10 INJECTION, EMULSION INTRAVENOUS at 14:25

## 2020-06-30 RX ADMIN — PROPOFOL 20 MG: 10 INJECTION, EMULSION INTRAVENOUS at 14:32

## 2020-06-30 RX ADMIN — PROPOFOL 10 MG: 10 INJECTION, EMULSION INTRAVENOUS at 14:36

## 2020-06-30 RX ADMIN — PROPOFOL 10 MG: 10 INJECTION, EMULSION INTRAVENOUS at 14:38

## 2020-06-30 RX ADMIN — DEXTROSE AND SODIUM CHLORIDE 30 ML/HR: 5; 450 INJECTION, SOLUTION INTRAVENOUS at 14:17

## 2020-06-30 NOTE — ANESTHESIA POSTPROCEDURE EVALUATION
Patient: Genna Garcia    Procedure Summary     Date:  06/30/20 Room / Location:  [facility] ENDOSCOPY 2 / [facility] ENDOSCOPY    Anesthesia Start:  1422 Anesthesia Stop:  1441    Procedures:       ESOPHAGOGASTRODUODENOSCOPY (N/A )      COLONOSCOPY (N/A ) Diagnosis:       Change in bowel habits      Other dysphagia      (Change in bowel habits [R19.4])      (Other dysphagia [R13.19])    Surgeon:  Evan Rondon MD Provider:  Kellie Davidson CRNA    Anesthesia Type:  MAC ASA Status:  3          Anesthesia Type: MAC    Vitals  No vitals data found for the desired time range.          Post Anesthesia Care and Evaluation    Patient location during evaluation: bedside  Patient participation: complete - patient participated  Level of consciousness: sleepy but conscious  Pain score: 0  Pain management: adequate  Airway patency: patent  Anesthetic complications: No anesthetic complications  PONV Status: none  Cardiovascular status: acceptable  Respiratory status: acceptable  Hydration status: acceptable

## 2020-06-30 NOTE — ANESTHESIA PREPROCEDURE EVALUATION
Anesthesia Evaluation     Patient summary reviewed and Nursing notes reviewed   NPO Solid Status: > 8 hours  NPO Liquid Status: > 4 hours           Airway   TM distance: >3 FB  Neck ROM: full  Dental - normal exam     Pulmonary    (+) a smoker Former, recent URI resolved, decreased breath sounds,     ROS comment: pulmonary fibrosis on Prednisone  Cardiovascular - normal exam    (+) hyperlipidemia,       Neuro/Psych  GI/Hepatic/Renal/Endo    (+)  GERD,      Musculoskeletal     Abdominal  - normal exam   Substance History      OB/GYN          Other   arthritis,                    Anesthesia Plan    ASA 3     MAC     intravenous induction     Anesthetic plan, all risks, benefits, and alternatives have been provided, discussed and informed consent has been obtained with: patient.

## 2020-07-02 LAB
LAB AP CASE REPORT: NORMAL
PATH REPORT.FINAL DX SPEC: NORMAL

## 2020-07-09 ENCOUNTER — OFFICE VISIT (OUTPATIENT)
Dept: GASTROENTEROLOGY | Facility: CLINIC | Age: 65
End: 2020-07-09

## 2020-07-09 VITALS
WEIGHT: 180.2 LBS | SYSTOLIC BLOOD PRESSURE: 122 MMHG | HEART RATE: 93 BPM | BODY MASS INDEX: 30.02 KG/M2 | DIASTOLIC BLOOD PRESSURE: 77 MMHG | HEIGHT: 65 IN

## 2020-07-09 DIAGNOSIS — R13.19 OTHER DYSPHAGIA: Primary | ICD-10-CM

## 2020-07-09 PROCEDURE — 99214 OFFICE O/P EST MOD 30 MIN: CPT | Performed by: INTERNAL MEDICINE

## 2020-07-09 NOTE — PATIENT INSTRUCTIONS

## 2020-09-08 ENCOUNTER — TELEPHONE (OUTPATIENT)
Dept: OTOLARYNGOLOGY | Facility: CLINIC | Age: 65
End: 2020-09-08

## 2020-09-08 RX ORDER — OLOPATADINE HYDROCHLORIDE 665 UG/1
SPRAY NASAL
Qty: 30.5 G | Refills: 1 | Status: SHIPPED | OUTPATIENT
Start: 2020-09-08 | End: 2020-10-07

## 2020-09-08 NOTE — TELEPHONE ENCOUNTER
----- Message from Alejandro Gonzalez MD sent at 9/8/2020 10:18 AM CDT -----  Contact: [phone]  Ok for 2 months needs f/u seen >1 yr ago  ----- Message -----  From: Brynn Alexis  Sent: 9/8/2020   9:50 AM CDT  To: Charlene Deal, Maria Luisa Mayberry, CSA, #    Needs refill of Olopatadine called to [facility].

## 2020-09-08 NOTE — TELEPHONE ENCOUNTER
09/08/2020, 1255 - Patient telephoned per this staff member.  Patient made aware prior clinical appointment with Dr. Gonzalez 08/07/2019.  Patient made aware she is in need of scheduling clinical appointment.  Patient in agreement with clinical appointment Wednesday, October 7, 2020 at 2:45 P.M. at [facility], [place], [place].  Patient made aware Patanase 0.6% Nasal Solution, 2 sprays into each nostril 2 times per day, 30.5 GM, 1 renewal has been approved per Dr. Gonzalez.  Patient confirmed pharmacy of choice, [facility] [place], [place], [place].

## 2020-10-07 ENCOUNTER — OFFICE VISIT (OUTPATIENT)
Dept: OTOLARYNGOLOGY | Facility: CLINIC | Age: 65
End: 2020-10-07

## 2020-10-07 VITALS — WEIGHT: 175 LBS | BODY MASS INDEX: 29.16 KG/M2 | TEMPERATURE: 98.2 F | HEIGHT: 65 IN

## 2020-10-07 DIAGNOSIS — J34.3 NASAL TURBINATE HYPERTROPHY: ICD-10-CM

## 2020-10-07 DIAGNOSIS — J34.89 NASAL VALVE BLOCKAGE: Primary | ICD-10-CM

## 2020-10-07 DIAGNOSIS — H69.83 DYSFUNCTION OF BOTH EUSTACHIAN TUBES: ICD-10-CM

## 2020-10-07 PROCEDURE — 99213 OFFICE O/P EST LOW 20 MIN: CPT | Performed by: OTOLARYNGOLOGY

## 2020-10-07 RX ORDER — MOMETASONE FUROATE 50 UG/1
2 SPRAY, METERED NASAL 2 TIMES DAILY
Qty: 17 G | Refills: 5 | Status: SHIPPED | OUTPATIENT
Start: 2020-10-07 | End: 2020-10-12 | Stop reason: SDUPTHER

## 2020-10-07 RX ORDER — OLOPATADINE HYDROCHLORIDE 665 UG/1
2 SPRAY NASAL 2 TIMES DAILY
Qty: 30 G | Refills: 11 | Status: SHIPPED | OUTPATIENT
Start: 2020-10-07 | End: 2020-11-18 | Stop reason: SDUPTHER

## 2020-10-07 NOTE — PATIENT INSTRUCTIONS
"BMI for Adults  What is BMI?  Body mass index (BMI) is a number that is calculated from a person's weight and height. BMI can help estimate how much of a person's weight is composed of fat. BMI does not measure body fat directly. Rather, it is an alternative to procedures that directly measure body fat, which can be difficult and expensive.  BMI can help identify people who may be at higher risk for certain medical problems.  What are BMI measurements used for?  BMI is used as a screening tool to identify possible weight problems. It helps determine whether a person is obese, overweight, a healthy weight, or underweight.  BMI is useful for:  · Identifying a weight problem that may be related to a medical condition or may increase the risk for medical problems.  · Promoting changes, such as changes in diet and exercise, to help reach a healthy weight. BMI screening can be repeated to see if these changes are working.  How is BMI calculated?  BMI involves measuring your weight in relation to your height. Both height and weight are measured, and the BMI is calculated from those numbers. This can be done either in English (U[place] or metric measurements. Note that charts and online BMI calculators are available to help you find your BMI quickly and easily without having to do these calculations yourself.  To calculate your BMI in English (U.S[place] measurements:    1. Measure your weight in pounds (lb).  2. Multiply the number of pounds by 703.  ? For example, for a person who weighs 180 lb, multiply that number by 703, which equals 126,540.  3. Measure your height in inches. Then multiply that number by itself to get a measurement called \"inches squared.\"  ? For example, for a person who is 70 inches tall, the \"inches squared\" measurement is 70 inches x 70 inches, which equals 4,900 inches squared.  4. Divide the total from step 2 (number of lb x 703) by the total from step 3 (inches squared): 126,540 ÷ 4,900 = 25.8. This is " "your BMI.  To calculate your BMI in metric measurements:  1. Measure your weight in kilograms (kg).  2. Measure your height in meters (m). Then multiply that number by itself to get a measurement called \"meters squared.\"  ? For example, for a person who is 1.75 m tall, the \"meters squared\" measurement is 1.75 m x 1.75 m, which is equal to 3.1 meters squared.  3. Divide the number of kilograms (your weight) by the meters squared number. In this example: 70 ÷ 3.1 = 22.6. This is your BMI.  What do the results mean?  BMI charts are used to identify whether you are underweight, normal weight, overweight, or obese. The following guidelines will be used:  · Underweight: BMI less than 18.5.  · Normal weight: BMI between 18.5 and 24.9.  · Overweight: BMI between 25 and 29.9.  · Obese: BMI of 30 or above.  Keep these notes in mind:  · Weight includes both fat and muscle, so someone with a muscular build, such as an athlete, may have a BMI that is higher than 24.9. In cases like these, BMI is not an accurate measure of body fat.  · To determine if excess body fat is the cause of a BMI of 25 or higher, further assessments may need to be done by a health care provider.  · BMI is usually interpreted in the same way for men and women.  Where to find more information  For more information about BMI, including tools to quickly calculate your BMI, go to these websites:  · Centers for Disease Control and Prevention: www.cdc.gov  · American Heart Association: www.heart.org  · National Heart, Lung, and Blood [place]: www.nhlbi.nih.gov  Summary  · Body mass index (BMI) is a number that is calculated from a person's weight and height.  · BMI may help estimate how much of a person's weight is composed of fat. BMI can help identify those who may be at higher risk for certain medical problems.  · BMI can be measured using English measurements or metric measurements.  · BMI charts are used to identify whether you are underweight, normal " weight, overweight, or obese.  This information is not intended to replace advice given to you by your health care provider. Make sure you discuss any questions you have with your health care provider.  Document Released: 08/29/2005 Document Revised: 09/09/2020 Document Reviewed: 07/17/2020  Elsevier Patient Education © 2020 [employer]

## 2020-10-07 NOTE — PROGRESS NOTES
Subjective   Genna Garcia is a 65 y.o. female.     Follow-up nose and ear issues  History of Present Illness   Patient's symptoms stopped up sensation ear needs refills on Patanase not have any facial pain does have trouble breathing through her nose both ears are stopped up no drainage no discomfort does wear hearing aids but feels like her hearing is worsening she also has pulmonary problems and sees GI for reflux issues      The following portions of the patient's history were reviewed and updated as appropriate: allergies, current medications, past family history, past medical history, past social history, past surgical history and problem list.      Current Outpatient Medications:   •  acetaminophen-codeine (TYLENOL #3) 300-30 MG per tablet, Take 1 tablet by mouth 2 (Two) Times a Day., Disp: 60 tablet, Rfl: 0  •  Biotin 1 MG capsule, Take  by mouth., Disp: , Rfl:   •  famotidine (PEPCID) 20 MG tablet, Take 20 mg by mouth Daily., Disp: , Rfl:   •  fexofenadine-pseudoephedrine (ALLEGRA-D)  MG per 12 hr tablet, Take 1 tablet by mouth 2 (Two) Times a Day., Disp: 60 tablet, Rfl: 5  •  ipratropium (ATROVENT) 0.06 % nasal spray, 2 sprays into the nostril(s) as directed by provider 4 (Four) Times a Day., Disp: 120 each, Rfl: 12  •  ipratropium-albuterol (COMBIVENT RESPIMAT)  MCG/ACT inhaler, Inhale 1 puff 4 (Four) Times a Day As Needed for Shortness of Air., Disp: 4 g, Rfl: 11  •  pantoprazole (PROTONIX) 40 MG EC tablet, Take 1 tablet by mouth Daily., Disp: 30 tablet, Rfl: 5  •  traMADol (ULTRAM) 50 MG tablet, Take 1 tablet by mouth Every 6 (Six) Hours As Needed for Moderate Pain ., Disp: 30 tablet, Rfl: 0  •  albuterol (PROVENTIL) (2.5 MG/3ML) 0.083% nebulizer solution, Take 2.5 mg by nebulization Every 4 (Four) Hours As Needed for Wheezing., Disp: 120 mL, Rfl: 12  •  albuterol sulfate  (90 Base) MCG/ACT inhaler, Inhale 2 puffs Every 4 (Four) Hours As Needed for Wheezing., Disp: , Rfl:    •  EPINEPHrine (EPIPEN) 0.3 MG/0.3ML solution auto-injector injection, INJECT INTRAMUSCULARLY AS DIRECTED, Disp: , Rfl: 1  •  mometasone (NASONEX) 50 MCG/ACT nasal spray, 2 sprays into the nostril(s) as directed by provider 2 (Two) Times a Day., Disp: 17 g, Rfl: 5  •  olopatadine (PATANASE) 0.6 % solution nasal solution, 2 sprays into each nostril 2 times per day, Disp: 30.5 g, Rfl: 1  •  predniSONE (Deltasone) 20 MG tablet, Take 1 tablet by mouth Daily., Disp: 30 tablet, Rfl: 0  •  raNITIdine (ZANTAC) 300 MG tablet, Take 1 tablet by mouth Daily., Disp: 30 tablet, Rfl: 4  •  sodium-potassium-magnesium sulfates (SUPREP) 17.5-3.13-1.6 GM/177ML solution oral solution, Take 1 bottle by mouth Every 12 (Twelve) Hours., Disp: 1 bottle, Rfl: 0  •  sucralfate (CARAFATE) 1 g tablet, Take 1 tablet by mouth every night at bedtime., Disp: 30 tablet, Rfl: 4  •  valACYclovir (VALTREX) 1000 MG tablet, Take 1 tablet by mouth Daily., Disp: 10 tablet, Rfl: 0    Allergies   Allergen Reactions   • Sulfa Antibiotics Other (See Comments)     Low platelets   • Augmentin [Amoxicillin-Pot Clavulanate] Diarrhea   • Cefuroxime Diarrhea             Review of Systems   Constitutional: Negative for fever.   HENT: Positive for congestion and postnasal drip. Negative for nosebleeds and sinus pain.    Respiratory: Positive for shortness of breath.    Hematological: Negative for adenopathy.           Objective   Physical Exam  Vitals signs and nursing note reviewed. Exam conducted with a chaperone present.   HENT:      Right Ear: Ear canal normal.      Left Ear: Ear canal normal.      Ears:      Comments: No fluid seen in middle ear     Nose:      Comments: Turbinate hypertrophy no pus polyps or blood     Mouth/Throat:      Pharynx: Oropharynx is clear.   Eyes:      Conjunctiva/sclera: Conjunctivae normal.   Pulmonary:      Effort: Pulmonary effort is normal.   Neurological:      General: No focal deficit present.      Mental Status: She is alert.    Psychiatric:         Mood and Affect: Mood normal.             Assessment/Plan   Genna was seen today for follow-up.    Diagnoses and all orders for this visit:    Nasal valve blockage    Nasal turbinate hypertrophy    Dysfunction of both eustachian tubes    Other orders  -     mometasone (NASONEX) 50 MCG/ACT nasal spray; 2 sprays into the nostril(s) as directed by provider 2 (Two) Times a Day.    Discontinue Flonase which she was not using regularly and begin Nasonex twice daily will try and avoid oral steroids we will recheck her hearing on follow-up and check her eustachian tube dysfunction I talked her about surgical issue but she has lung problems and pulmonary fibrosis so nasal surgery is not higher priorities at this point we will see if we can improve her airway breathing with the nasal spray as well

## 2020-10-12 ENCOUNTER — TELEPHONE (OUTPATIENT)
Dept: OTOLARYNGOLOGY | Facility: CLINIC | Age: 65
End: 2020-10-12

## 2020-10-12 RX ORDER — MOMETASONE FUROATE 50 UG/1
2 SPRAY, METERED NASAL 2 TIMES DAILY
Qty: 17 G | Refills: 5 | Status: SHIPPED | OUTPATIENT
Start: 2020-10-12 | End: 2020-10-21

## 2020-10-12 NOTE — TELEPHONE ENCOUNTER
10/12/2020, 1315 - Patient telephone call returned per this staff member [phone].  Patient stated she has spoken to [facility], [place], [place] today, Monday, October 12, 2020.  Pharmacy stated prescription medication Mometasone (Nasonex) 50 MCG/ACT Nasal Spray requires a Prior Authorization.  Patient made aware Prior Authorization obtained 10/09/2020 effective 10/09/2020 - 10/09/2021.  Patient made aware pharmacy would not accept a copy of Prior Authorization via facsimile as they do not contact insurance.  Patient requested prescription medication to be routed to alternate pharmacy: [facility], [place], [place].  Patient made aware Dr. Gonzalez out of office today, however, he will be in office in late afternoon tomorrow, Tuesday, October 13, 2020.  Patient verbalized understanding.      Note - The following is the E-Mail I submitted to Diane Jerome, Practice Manager in order to submit the patient's Prior Authorization approval to her via E-Mail per teresa's request:    The patient’s name is Genna Garcia, 1955, MRN [id number].  I completed a Prior Authorization for the patient’s prescription medication Mometasone Furoate 50 MCG Nasal Spray approximately 8:00 A.M. today, Friday, October 9, 2020.  I immediately received an approval.  I contacted the patient’s pharmacy, [email or web address], [place], [place] [phone] at  9:55 A.M.  I spoke with pharmacy personnel Pam, notifying her of the Prior Authorization approval effective 10/09/2020 - 10/09/2021.  She stated the prescription medication still requires a Prior Authorization.  I offered to submit the Prior Authorization approval to the pharmacy via [employer].  The pharmacy declined the offer stating they do not contact the patient’s insurance regarding Prior Authorizations, it is the responsibility of the physician’s office.  I contacted the patient.  She has requested I submit the Prior  Authorization approval to her via E-Mail.  The patient’s E-Mail is as follows: [email or web address]

## 2020-10-12 NOTE — TELEPHONE ENCOUNTER
----- Message from Riley Pemberton sent at 10/12/2020 12:15 PM CDT -----  Contact: [phone]  Called to say that she was told by our office Friday that we were sent the approval from her insurance for the prescription; but it was not sent to [facility] in [place] and they will not fill the prescription without it.

## 2020-10-13 ENCOUNTER — TELEPHONE (OUTPATIENT)
Dept: GASTROENTEROLOGY | Facility: CLINIC | Age: 65
End: 2020-10-13

## 2020-10-13 ENCOUNTER — OFFICE VISIT (OUTPATIENT)
Dept: GASTROENTEROLOGY | Facility: CLINIC | Age: 65
End: 2020-10-13

## 2020-10-13 VITALS
BODY MASS INDEX: 28.16 KG/M2 | TEMPERATURE: 97.3 F | HEIGHT: 66 IN | WEIGHT: 175.2 LBS | HEART RATE: 92 BPM | OXYGEN SATURATION: 94 %

## 2020-10-13 DIAGNOSIS — R13.19 OTHER DYSPHAGIA: ICD-10-CM

## 2020-10-13 DIAGNOSIS — K59.04 CHRONIC IDIOPATHIC CONSTIPATION: Primary | ICD-10-CM

## 2020-10-13 DIAGNOSIS — R19.4 CHANGE IN BOWEL HABITS: ICD-10-CM

## 2020-10-13 DIAGNOSIS — R19.7 DIARRHEA, UNSPECIFIED TYPE: ICD-10-CM

## 2020-10-13 PROCEDURE — 99214 OFFICE O/P EST MOD 30 MIN: CPT | Performed by: INTERNAL MEDICINE

## 2020-10-13 RX ORDER — FLUTICASONE PROPIONATE 50 MCG
SPRAY, SUSPENSION (ML) NASAL
Qty: 16 ML | Refills: 5 | Status: SHIPPED | OUTPATIENT
Start: 2020-10-13 | End: 2020-11-18 | Stop reason: SDUPTHER

## 2020-10-13 NOTE — PATIENT INSTRUCTIONS
MyPlate from USDA    MyPlate is an outline of a general healthy diet based on the 2010 Dietary Guidelines for Americans, from the U.S. Department of Agriculture (USDA). It sets guidelines for how much food you should eat from each food group based on your age, sex, and level of physical activity.  What are tips for following MyPlate?  To follow MyPlate recommendations:  · Eat a wide variety of fruits and vegetables, grains, and protein foods.  · Serve smaller portions and eat less food throughout the day.  · Limit portion sizes to avoid overeating.  · Enjoy your food.  · Get at least 150 minutes of exercise every week. This is about 30 minutes each day, 5 or more days per week.  It can be difficult to have every meal look like MyPlate. Think about MyPlate as eating guidelines for an entire day, rather than each individual meal.  Fruits and vegetables  · Make half of your plate fruits and vegetables.  · Eat many different colors of fruits and vegetables each day.  · For a 2,000 calorie daily food plan, eat:  ? 2½ cups of vegetables every day.  ? 2 cups of fruit every day.  · 1 cup is equal to:  ? 1 cup raw or cooked vegetables.  ? 1 cup raw fruit.  ? 1 medium-sized orange, apple, or banana.  ? 1 cup 100% fruit or vegetable juice.  ? 2 cups raw leafy greens, such as lettuce, spinach, or kale.  ? ½ cup dried fruit.  Grains  · One fourth of your plate should be grains.  · Make at least half of the grains you eat each day whole grains.  · For a 2,000 calorie daily food plan, eat 6 oz of grains every day.  · 1 oz is equal to:  ? 1 slice bread.  ? 1 cup cereal.  ? ½ cup cooked rice, cereal, or pasta.  Protein  · One fourth of your plate should be protein.  · Eat a wide variety of protein foods, including meat, poultry, fish, eggs, beans, nuts, and tofu.  · For a 2,000 calorie daily food plan, eat 5½ oz of protein every day.  · 1 oz is equal to:  ? 1 oz meat, poultry, or fish.  ? ¼ cup cooked beans.  ? 1 egg.  ? ½ oz nuts  or seeds.  ? 1 Tbsp peanut butter.  Dairy  · Drink fat-free or low-fat (1%) milk.  · Eat or drink dairy as a side to meals.  · For a 2,000 calorie daily food plan, eat or drink 3 cups of dairy every day.  · 1 cup is equal to:  ? 1 cup milk, yogurt, cottage cheese, or soy milk (soy beverage).  ? 2 oz processed cheese.  ? 1½ oz natural cheese.  Fats, oils, salt, and sugars  · Only small amounts of oils are recommended.  · Avoid foods that are high in calories and low in nutritional value (empty calories), like foods high in fat or added sugars.  · Choose foods that are low in salt (sodium). Choose foods that have less than 140 milligrams (mg) of sodium per serving.  · Drink water instead of sugary drinks. Drink enough water each day to keep your urine pale yellow.  Where to find support  · Work with your health care provider or a nutrition specialist (dietitian) to develop a customized eating plan that is right for you.  · Download an jessica (mobile application) to help you track your daily food intake.  Where to find more information  · Go to ChooseMyPlate.gov for more information.  Summary  · MyPlate is a general guideline for healthy eating from the USDA. It is based on the 2010 Dietary Guidelines for Americans.  · In general, fruits and vegetables should take up ½ of your plate, grains should take up ¼ of your plate, and protein should take up ¼ of your plate.  This information is not intended to replace advice given to you by your health care provider. Make sure you discuss any questions you have with your health care provider.  Document Released: 01/06/2009 Document Revised: 05/21/2020 Document Reviewed: 03/19/2018  Elsevier Patient Education © 2020 Elsevier Inc.

## 2020-10-13 NOTE — PROGRESS NOTES
"[facility]      Chief Complaint:   Chief Complaint   Patient presents with   • Other Dysphagia   • Irritable Bowel Syndrome Flare       Subjective     HPI:   Patient with irritable bowel syndrome with constipation and diarrhea.  Patient states that she continues to have episodes of constipation.  Patient's dysphasia is markedly improved since EGD with dilatation but states that she is now getting reflux despite taking proton pump inhibitor and H2 blocker.  Discussed with patient that I believe that her big problem is her constipation we will treat patient with Trulance see if any improvement in symptoms.  Also do GI distress panel and alpha gal determine if any other causes of patient's change in bowel habits exist.  Also do an IBD panel patient had a positive THAIS in the past.    Plan; we will schedule patient for follow-up in 4 weeks.    Past Medical History:   Past Medical History:   Diagnosis Date   • Anesthesia complication     states she \"stopped breathing during her last procedure\"   • Arthritis    • Elevated cholesterol    • Endometriosis    • Falls    • GERD (gastroesophageal reflux disease)    • Osteoporosis    • Scoliosis of thoracolumbar spine    • Seasonal rhinitis    • Sinusitis        Past Surgical History:  Past Surgical History:   Procedure Laterality Date   • APPENDECTOMY     • COLONOSCOPY     • COLONOSCOPY N/A 6/30/2020    Procedure: COLONOSCOPY;  Surgeon: Evan Rondon MD;  Location: [facility] ENDOSCOPY;  Service: Gastroenterology;  Laterality: N/A;   • ENDOSCOPY N/A 10/16/2019    Procedure: ESOPHAGOGASTRODUODENOSCOPY;  Surgeon: Evan Rondon MD;  Location: [facility] ENDOSCOPY;  Service: Gastroenterology   • ENDOSCOPY N/A 6/30/2020    Procedure: ESOPHAGOGASTRODUODENOSCOPY;  Surgeon: Evan Rondon MD;  Location: [facility] ENDOSCOPY;  Service: Gastroenterology;  Laterality: N/A;  savory dilation 48-54   • FOREARM SURGERY     • HYSTERECTOMY     • SALPINGO OOPHORECTOMY     • " UPPER GASTROINTESTINAL ENDOSCOPY  10/16/2019   • UPPER GASTROINTESTINAL ENDOSCOPY  06/30/2020   • WRIST FRACTURE SURGERY Right        Family History:  Family History   Problem Relation Age of Onset   • Heart failure Mother    • Thyroid disease Mother    • Ulcerative colitis Father        Social History:   reports that she quit smoking about 18 years ago. She quit after 20.00 years of use. She has never used smokeless tobacco. She reports that she does not drink alcohol or use drugs.    Medications:   Prior to Admission medications    Medication Sig Start Date End Date Taking? Authorizing Provider   acetaminophen-codeine (TYLENOL #3) 300-30 MG per tablet Take 1 tablet by mouth 2 (Two) Times a Day.  Patient taking differently: Take 1 tablet by mouth 2 (Two) Times a Day As Needed. 4/6/20  Yes Jayleen Maier APRN   albuterol (PROVENTIL) (2.5 MG/3ML) 0.083% nebulizer solution Take 2.5 mg by nebulization Every 4 (Four) Hours As Needed for Wheezing. 1/14/20  Yes Jayleen Maier APRN   albuterol sulfate  (90 Base) MCG/ACT inhaler Inhale 2 puffs Every 4 (Four) Hours As Needed for Wheezing.   Yes ProviderRoosevelt MD   Biotin 1 MG capsule Take 1 capsule by mouth Daily.   Yes ProviderRoosevelt MD   EPINEPHrine (EPIPEN) 0.3 MG/0.3ML solution auto-injector injection INJECT INTRAMUSCULARLY AS DIRECTED 5/5/17  Yes ProviderRoosevelt MD   famotidine (PEPCID) 20 MG tablet Take 20 mg by mouth Daily. 5/26/20  Yes ProviderRoosevelt MD   fexofenadine-pseudoephedrine (ALLEGRA-D)  MG per 12 hr tablet Take 1 tablet by mouth 2 (Two) Times a Day. 4/3/20  Yes Jayleen Maier APRN   ipratropium-albuterol (COMBIVENT RESPIMAT)  MCG/ACT inhaler Inhale 1 puff 4 (Four) Times a Day As Needed for Shortness of Air. 1/14/20  Yes Jayleen Maier APRN   mometasone (NASONEX) 50 MCG/ACT nasal spray 2 sprays into the nostril(s) as directed by provider 2 (Two) Times a Day. 10/12/20  Yes Alejandro Gonzalez MD    olopatadine (PATANASE) 0.6 % solution nasal solution 2 sprays by Each Nare route 2 (Two) Times a Day. 10/7/20  Yes Alejandro Gonzalez MD   pantoprazole (PROTONIX) 40 MG EC tablet Take 1 tablet by mouth Daily. 1/28/20  Yes Evan Rondon MD   traMADol (ULTRAM) 50 MG tablet Take 1 tablet by mouth Every 6 (Six) Hours As Needed for Moderate Pain . 2/24/20  Yes Jayleen Maier APRN   Plecanatide (Trulance) 3 MG tablet Take 1 tablet by mouth Daily. 10/13/20   Evan Rondon MD   predniSONE (Deltasone) 20 MG tablet Take 1 tablet by mouth Daily. 6/2/20 10/13/20  Lennox Dahl MD   raNITIdine (ZANTAC) 300 MG tablet Take 1 tablet by mouth Daily. 3/20/20 10/13/20  Evan Rondon MD   sodium-potassium-magnesium sulfates (SUPREP) 17.5-3.13-1.6 GM/177ML solution oral solution Take 1 bottle by mouth Every 12 (Twelve) Hours. 6/23/20 10/13/20  Evan Rondon MD   sucralfate (CARAFATE) 1 g tablet Take 1 tablet by mouth every night at bedtime. 10/25/19 10/13/20  Evan Rondon MD   valACYclovir (VALTREX) 1000 MG tablet Take 1 tablet by mouth Daily. 3/13/20 10/13/20  Jayleen Maier APRN       Allergies:  Sulfa antibiotics, Augmentin [amoxicillin-pot clavulanate], and Cefuroxime    ROS:    Review of Systems   Constitutional: Negative for activity change, appetite change, chills, diaphoresis, fatigue, fever and unexpected weight change.   HENT: Negative for sore throat and trouble swallowing.    Respiratory: Negative for shortness of breath.    Gastrointestinal: Positive for abdominal pain, constipation and diarrhea. Negative for abdominal distention, anal bleeding, blood in stool, nausea, rectal pain and vomiting.   Endocrine: Negative for polydipsia, polyphagia and polyuria.   Genitourinary: Negative for difficulty urinating.   Musculoskeletal: Negative for arthralgias.   Skin: Negative for pallor.   Allergic/Immunologic: Negative for food allergies.   Neurological: Negative for weakness and light-headedness.  "  Psychiatric/Behavioral: Negative for behavioral problems.     Objective     Pulse 92, temperature 97.3 °F (36.3 °C), temperature source Temporal, height 166.4 cm (65.5\"), weight 79.5 kg (175 lb 3.2 oz), SpO2 94 %.    Physical Exam  Constitutional:       General: She is not in acute distress.     Appearance: She is well-developed. She is not diaphoretic.   HENT:      Head: Normocephalic and atraumatic.   Cardiovascular:      Rate and Rhythm: Normal rate and regular rhythm.      Heart sounds: Normal heart sounds. No murmur. No friction rub. No gallop.    Pulmonary:      Effort: No respiratory distress.      Breath sounds: Normal breath sounds. No wheezing or rales.   Chest:      Chest wall: No tenderness.   Abdominal:      General: Bowel sounds are normal. There is no distension.      Palpations: Abdomen is soft. There is no mass.      Tenderness: There is no abdominal tenderness. There is no guarding or rebound.      Hernia: No hernia is present.   Musculoskeletal: Normal range of motion.   Skin:     General: Skin is warm and dry.      Coloration: Skin is not pale.      Findings: No erythema or rash.   Neurological:      Mental Status: She is alert and oriented to person, place, and time.   Psychiatric:         Behavior: Behavior normal.         Thought Content: Thought content normal.         Judgment: Judgment normal.          Assessment/Plan   Diagnoses and all orders for this visit:    1. Chronic idiopathic constipation (Primary)  -     Recurrent Gastrointestinal Distress  -     IBD Expanded Panel; Future  -     Alpha - Gal Panel; Future    2. Diarrhea, unspecified type  -     Recurrent Gastrointestinal Distress  -     IBD Expanded Panel; Future  -     Alpha - Gal Panel; Future    3. Change in bowel habits  -     Recurrent Gastrointestinal Distress  -     IBD Expanded Panel; Future  -     Alpha - Gal Panel; Future    4. Other dysphagia  -     Recurrent Gastrointestinal Distress  -     IBD Expanded Panel; " Future  -     Alpha - Gal Panel; Future    Other orders  -     Plecanatide (Trulance) 3 MG tablet; Take 1 tablet by mouth Daily.  Dispense: 90 tablet; Refill: 5        * Surgery not found *     Diagnosis Plan   1. Chronic idiopathic constipation  Recurrent Gastrointestinal Distress    IBD Expanded Panel    Alpha - Gal Panel   2. Diarrhea, unspecified type  Recurrent Gastrointestinal Distress    IBD Expanded Panel    Alpha - Gal Panel   3. Change in bowel habits  Recurrent Gastrointestinal Distress    IBD Expanded Panel    Alpha - Gal Panel   4. Other dysphagia  Recurrent Gastrointestinal Distress    IBD Expanded Panel    Alpha - Gal Panel       Anticipated Surgical Procedure:  Orders Placed This Encounter   Procedures   • Recurrent Gastrointestinal Distress   • IBD Expanded Panel     Standing Status:   Future     Standing Expiration Date:   10/13/2021   • Alpha - Gal Panel     Standing Status:   Future     Standing Expiration Date:   10/13/2021       The risks, benefits, and alternatives of this procedure have been discussed with the patient or the responsible party- the patient understands and agrees to proceed.

## 2020-10-13 NOTE — TELEPHONE ENCOUNTER
Alejandro Gonzalez MD Mercer, Barbara Jean, BRIGID             I understood she used it before and it didn't help, ask her about this.   We can try and see if anything else is covered or do a PA    Previous Messages    ----- Message -----   From: Charlene Deal   Sent: 10/13/2020  11:03 AM CDT   To: Brynn Alexis, Maria Luisa Mayberry CSA, *     Jay @ [facility]... Wants to know if he can change the medication she was given yesterday. Flonase is covered on her ins.         10/13/2020, 1327 - Patient telephoned per this staff member [phone].  Patient stated she has utilized Flonase Nasal Spray, and it did provide relief of symptoms.     10/13/2020, 1332 - [facility] telephoned per this staff member [phone]. Spoke with Pharmacist, Jay.  Nasonex has a $59.00 co-pay due to patient's insurance deductible.  Alternates preferred per insurance Fluticasone (Flonase), Flunisolide 0.025%, and Omnaris.    10/13/2020, 1338 - Patient telephoned per this staff member [phone].  Zero answer.  Voice message submitted with request to contact office.    Rationale for speaking with patient - Inquiry if patient would like to proceed with prescription medication Mometasone (Nasonex) 50 MCG/ACT Nasal Spray which has a $59.00 co-pay due to insurance deductible or would patient rather receive prescription for Fluticasone (Flonase) which is a covered benefit per patient's insurance.    10/13/2020, 1400 - Patient returned this staff member's telephone call as requested.  Inquired if patient would like to proceed with prescription medication Mometasone (Nasonex) 50 MCG/ACT Nasal Spray which has a $59.00 co-pay due to insurance deductible or would patient rather receive prescription for Fluticasone (Flonase) which is a covered benefit per patient's insurance.  Patient stated she would prefer a prescription for Fluticasone (Flonase) to be submitted to [facility], [place], [place].  Patient  instructed to contact pharmacy prior to travel to ensure prescription medication is available and ready for patient collection.  Patient verbalized understanding.

## 2020-10-20 NOTE — PROGRESS NOTES
"    Pulmonary Office Follow-up    Subjective     Genna Garcia is seen today at the office for   Chief Complaint   Patient presents with   • Shortness of Breath     Former [facility] patient         History of Present Illness  Genna Garcia is a 65 y.o. female with a PMH significant for pulmonary fibrosis, past tobacco use, allergies, and GERD who presents for evaluation of dyspnea and lung disease.    10/21/20: Pt states she saw Dr. Dahl twice and he told her that she had pulmonary fibrosis. She complains of worsening TAM, wheeze and cough. Pt reports having a cough for 4 yrs. She has combivent which she uses q4hrs but relief is temporary. Pt reports that she has a \"ton of phlegm\" which is worst in the mornings. She states she breathes well on her left side, but she cannot tolerate lying supine. Pt reports that she has severe reflux and believes that she may have aspirated when lying flat. Her reflux is mostly controlled on pepcid qhs and protonix daily. She was previously taking carafate but she does not have to take it any further. Pt admits that she has had issues with esophageal strictures requiring dilation. She states when she breathes she has the sensation that there is hair in it and she also has a lump in her throat which is difficult to expectorate. Pt has had post-tussive emesis. 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 Pt is seeing Dr. Gonzalez who has her on patanase and flonase. She feels like they help but she still has the postnasal gtt. Pt has Allegra-D but she uses it prn. Her cough is her primary symptom and the paroxysms cause her to get out of breath. She is a former smoker. 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 Her mother had COPD (nonsmoker).    Review and summarization of Dr. Dahl's most recent note on 6/2/2020: Patient complained of cough and dyspnea.  He stated that CT revealed pulmonary fibrosis and her THAIS was " "mildly positive but she did not have any arthritis.  Physical exam documented lungs \"reveal a few rales bilaterally\".  Lung biopsy was discussed but the patient preferred to use medications so he recommended a short course of prednisone then considering methotrexate.  On his initial evaluation on 5/19/2020 he recommended CT with serologies.  Patient was treated with Depo-Medrol and Trelegy inhaler.  No PFTs were performed.    Tobacco use history:  Type: cigarettes  Amount: 2-3 ppd  Duration: 18 years  Cessation: 2002   Willing to quit: N/A      Review of Systems: History obtained from chart review and the patient.  Review of Systems   Constitutional: Negative for fatigue, fever and unexpected weight change.   HENT: Positive for congestion, postnasal drip, trouble swallowing and voice change.    Respiratory: Positive for cough, shortness of breath and wheezing.    Cardiovascular: Negative for chest pain and leg swelling.   Gastrointestinal: Positive for abdominal pain.        Reflux     As described in the HPI. Otherwise, remainder of ROS (14 systems) were negative.    Patient Active Problem List   Diagnosis   • Scoliosis of thoracolumbar spine   • Pleuritic pain   • Other dysphagia   • Acute sinusitis   • Acute upper respiratory infection   • Candidiasis of vagina   • Pain in joint of right shoulder   • Change in bowel habits         Current Outpatient Medications:   •  acetaminophen-codeine (TYLENOL #3) 300-30 MG per tablet, Take 1 tablet by mouth 2 (Two) Times a Day. (Patient taking differently: Take 1 tablet by mouth 2 (Two) Times a Day As Needed.), Disp: 60 tablet, Rfl: 0  •  albuterol (PROVENTIL) (2.5 MG/3ML) 0.083% nebulizer solution, Take 2.5 mg by nebulization Every 4 (Four) Hours As Needed for Wheezing., Disp: 120 mL, Rfl: 12  •  albuterol sulfate  (90 Base) MCG/ACT inhaler, Inhale 2 puffs Every 4 (Four) Hours As Needed for Wheezing., Disp: , Rfl:   •  Biotin 1 MG capsule, Take 1 capsule by mouth " "Daily., Disp: , Rfl:   •  EPINEPHrine (EPIPEN) 0.3 MG/0.3ML solution auto-injector injection, INJECT INTRAMUSCULARLY AS DIRECTED, Disp: , Rfl: 1  •  famotidine (PEPCID) 20 MG tablet, Take 20 mg by mouth Daily., Disp: , Rfl:   •  fluticasone (FLONASE) 50 MCG/ACT nasal spray, 2 sprays into each nostril daily, Disp: 16 mL, Rfl: 5  •  ipratropium-albuterol (COMBIVENT RESPIMAT)  MCG/ACT inhaler, Inhale 1 puff 4 (Four) Times a Day As Needed for Shortness of Air., Disp: 4 g, Rfl: 11  •  olopatadine (PATANASE) 0.6 % solution nasal solution, 2 sprays by Each Nare route 2 (Two) Times a Day., Disp: 30 g, Rfl: 11  •  fexofenadine (Allegra Allergy) 180 MG tablet, Take 1 tablet by mouth Daily., Disp: 30 tablet, Rfl: 5  •  fluticasone (FLOVENT HFA) 44 MCG/ACT inhaler, Inhale 2 puffs 2 (Two) Times a Day., Disp: 1 inhaler, Rfl: 11  •  pantoprazole (PROTONIX) 40 MG EC tablet, Take 1 tablet by mouth Daily., Disp: 30 tablet, Rfl: 5  •  Plecanatide (Trulance) 3 MG tablet, Take 1 tablet by mouth Daily., Disp: 90 tablet, Rfl: 5  •  traMADol (ULTRAM) 50 MG tablet, Take 1 tablet by mouth Every 6 (Six) Hours As Needed for Moderate Pain ., Disp: 30 tablet, Rfl: 0    Allergies   Allergen Reactions   • Sulfa Antibiotics Other (See Comments)     Low platelets   • Augmentin [Amoxicillin-Pot Clavulanate] Diarrhea   • Cefuroxime Diarrhea       Past Medical History:   Diagnosis Date   • Anesthesia complication     states she \"stopped breathing during her last procedure\"   • Arthritis    • Elevated cholesterol    • Endometriosis    • Falls    • GERD (gastroesophageal reflux disease)    • Osteoporosis    • Scoliosis of thoracolumbar spine    • Seasonal rhinitis    • Sinusitis      Past Surgical History:   Procedure Laterality Date   • APPENDECTOMY     • COLONOSCOPY     • COLONOSCOPY N/A 6/30/2020    Procedure: COLONOSCOPY;  Surgeon: Evan Rondon MD;  Location: [facility] ENDOSCOPY;  Service: Gastroenterology;  Laterality: N/A;   • ENDOSCOPY N/A " 10/16/2019    Procedure: ESOPHAGOGASTRODUODENOSCOPY;  Surgeon: Evan Rondon MD;  Location: [facility] ENDOSCOPY;  Service: Gastroenterology   • ENDOSCOPY N/A 2020    Procedure: ESOPHAGOGASTRODUODENOSCOPY;  Surgeon: Evan Rondon MD;  Location: [facility] ENDOSCOPY;  Service: Gastroenterology;  Laterality: N/A;  savory dilation 48-54   • FOREARM SURGERY     • HYSTERECTOMY     • SALPINGO OOPHORECTOMY     • UPPER GASTROINTESTINAL ENDOSCOPY  10/16/2019   • UPPER GASTROINTESTINAL ENDOSCOPY  2020   • WRIST FRACTURE SURGERY Right      Social History     Socioeconomic History   • Marital status:      Spouse name: Not on file   • Number of children: Not on file   • Years of education: Not on file   • Highest education level: Not on file   Tobacco Use   • Smoking status: Former Smoker     Years: 20.00     Quit date:      Years since quittin.8   • Smokeless tobacco: Never Used   Substance and Sexual Activity   • Alcohol use: No   • Drug use: No   • Sexual activity: Defer     Family History   Problem Relation Age of Onset   • Heart failure Mother    • Thyroid disease Mother    • Ulcerative colitis Father           Objective     Blood pressure 120/62, pulse 85, weight 79.8 kg (176 lb), SpO2 96 %.  Physical Exam  Vitals signs and nursing note reviewed.   Constitutional:       Appearance: Normal appearance. She is well-developed.   HENT:      Head: Normocephalic and atraumatic.      Nose: Nose normal. No mucosal edema.      Mouth/Throat:      Pharynx: Uvula midline.      Comments: Mallampati 1  Eyes:      General: Lids are normal.      Conjunctiva/sclera: Conjunctivae normal.      Pupils: Pupils are equal, round, and reactive to light.   Neck:      Musculoskeletal: Normal range of motion.      Thyroid: No thyroid mass.      Trachea: Trachea normal. No tracheal tenderness.   Cardiovascular:      Rate and Rhythm: Normal rate and regular rhythm.      Chest Wall: PMI is not displaced.      Heart sounds:  Normal heart sounds. No murmur. No gallop.    Pulmonary:      Effort: Pulmonary effort is normal. No respiratory distress.      Breath sounds: Normal breath sounds. No decreased breath sounds, wheezing or rhonchi.   Chest:      Chest wall: No tenderness.   Abdominal:      General: Bowel sounds are normal.      Palpations: Abdomen is soft. There is no hepatomegaly.      Tenderness: There is no abdominal tenderness.   Musculoskeletal:      Comments: Normal gait, no extremity edema   Lymphadenopathy:      Head:      Right side of head: No submandibular adenopathy.      Left side of head: No submandibular adenopathy.      Cervical: No cervical adenopathy.      Upper Body:      Right upper body: No supraclavicular adenopathy.      Left upper body: No supraclavicular adenopathy.   Skin:     General: Skin is warm and dry.      Findings: No rash.      Nails: There is no clubbing.     Neurological:      Mental Status: She is alert and oriented to person, place, and time.   Psychiatric:         Speech: Speech normal.         Behavior: Behavior normal.         Judgment: Judgment normal.         PFTs: 10/21/20 (independently reviewed and interpreted by me)  Ratio 78  FVC 2.22/ 69%  FEV1 1.73/ 69%  TLC 2.77/ 53%  DLCO 15.66/ 61%  Variable effort.  Mild restriction. Mildly reduced diffusing capacity. No comparative data available.    Radiology (independently reviewed and interpreted by me): CT chest without contrast 5/20/2020 showed small focus of nodularity posterior right upper lobe, lower lobe predominant interstitial opacity with air bronchograms and traction bronchiectasis, numerous calcified granulomas bilaterally, mildly enlarged mediastinal lymph nodes largest precarinal measuring 1.1 x 1.55 cm, small axillary and retropectoral lymph nodes largest measuring 8.5 mm, findings similar to 4/2/2016       Assessment/Plan     Diagnoses and all orders for this visit:    1. Dyspnea on exertion (Primary)  -     Pulmonary Function  Test    2. Simple chronic bronchitis (CMS/HCC)  -     fluticasone (FLOVENT HFA) 44 MCG/ACT inhaler; Inhale 2 puffs 2 (Two) Times a Day.  Dispense: 1 inhaler; Refill: 11    3. Chronic allergic rhinitis  -     fexofenadine (Allegra Allergy) 180 MG tablet; Take 1 tablet by mouth Daily.  Dispense: 30 tablet; Refill: 5    4. Encounter for immunization  -     Cancel: Fluad Quad 65+ yrs ([id number])  -     FluLaval Quad >6 Months ([id number])    5. Gastroesophageal reflux disease with esophagitis without hemorrhage         Discussion/ Recommendations:   PFTs showed restrictive pattern, but patient had variable effort making conclusions difficult.  I reviewed her CT chest which did show some interstitial and probable fibrotic changes bilateral lower lobes, right greater than left.  I think her cough is secondary to a very cough syndrome which may be from allergies versus persistent reflux given her history.  It is also possible that the changes seen on her chest imaging are also secondary to chronic aspiration given the severity of her previous reflux.  I have recommended head of the bed elevation as well as dietary changes in addition to continuing on her antacid medications.  Otherwise, I think she would benefit from the addition of a nonsedating antihistamine in addition to her nasal steroid and a trial of an ICS.    -Start Flovent 44 HFA, 2 puffs twice daily.  Rinse mouth after use.  -Use Combivent only as needed for dyspnea or wheeze  -Start Allegra 180 mg daily.  If insurance does not cover, can try cetirizine 10 mg daily.  -Continue Flonase and Patanase daily  -Continue Pepcid and Protonix.  -Annual influenza vaccination.  Recommend she get the Pneumovax 23.    Patient's Body mass index is 28.84 kg/m². BMI is above normal parameters. Recommendations include: exercise counseling.           Return in about 4 weeks (around 11/18/2020) for F/u chronic cough.      Thank you for allowing me to participate in the care of Genna  Radha Garcia. Please do not hesitate to contact me with any questions.         This document has been electronically signed by Treasure Royal MD on October 21, 2020 11:30 CDT      Dictated using Dragon

## 2020-10-21 ENCOUNTER — OFFICE VISIT (OUTPATIENT)
Dept: PULMONOLOGY | Facility: CLINIC | Age: 65
End: 2020-10-21

## 2020-10-21 ENCOUNTER — PROCEDURE VISIT (OUTPATIENT)
Dept: PULMONOLOGY | Facility: CLINIC | Age: 65
End: 2020-10-21

## 2020-10-21 VITALS
WEIGHT: 176 LBS | HEART RATE: 85 BPM | BODY MASS INDEX: 28.84 KG/M2 | SYSTOLIC BLOOD PRESSURE: 120 MMHG | DIASTOLIC BLOOD PRESSURE: 62 MMHG | OXYGEN SATURATION: 96 %

## 2020-10-21 DIAGNOSIS — J41.0 SIMPLE CHRONIC BRONCHITIS (HCC): ICD-10-CM

## 2020-10-21 DIAGNOSIS — R06.09 DYSPNEA ON EXERTION: Primary | ICD-10-CM

## 2020-10-21 DIAGNOSIS — K21.00 GASTROESOPHAGEAL REFLUX DISEASE WITH ESOPHAGITIS WITHOUT HEMORRHAGE: ICD-10-CM

## 2020-10-21 DIAGNOSIS — Z23 ENCOUNTER FOR IMMUNIZATION: ICD-10-CM

## 2020-10-21 DIAGNOSIS — R06.02 SHORTNESS OF BREATH: Primary | ICD-10-CM

## 2020-10-21 DIAGNOSIS — J30.9 CHRONIC ALLERGIC RHINITIS: ICD-10-CM

## 2020-10-21 PROCEDURE — 94727 GAS DIL/WSHOT DETER LNG VOL: CPT | Performed by: INTERNAL MEDICINE

## 2020-10-21 PROCEDURE — 99214 OFFICE O/P EST MOD 30 MIN: CPT | Performed by: INTERNAL MEDICINE

## 2020-10-21 PROCEDURE — 94010 BREATHING CAPACITY TEST: CPT | Performed by: INTERNAL MEDICINE

## 2020-10-21 PROCEDURE — 94729 DIFFUSING CAPACITY: CPT | Performed by: INTERNAL MEDICINE

## 2020-10-21 PROCEDURE — 90471 IMMUNIZATION ADMIN: CPT | Performed by: INTERNAL MEDICINE

## 2020-10-21 PROCEDURE — 90686 IIV4 VACC NO PRSV 0.5 ML IM: CPT | Performed by: INTERNAL MEDICINE

## 2020-10-21 RX ORDER — FEXOFENADINE HCL 180 MG/1
180 TABLET ORAL DAILY
Qty: 30 TABLET | Refills: 5 | Status: SHIPPED | OUTPATIENT
Start: 2020-10-21 | End: 2021-07-01 | Stop reason: SDUPTHER

## 2020-10-21 RX ORDER — FLUTICASONE PROPIONATE 44 UG/1
2 AEROSOL, METERED RESPIRATORY (INHALATION)
Qty: 1 INHALER | Refills: 11 | Status: SHIPPED | OUTPATIENT
Start: 2020-10-21 | End: 2020-12-02

## 2020-10-21 NOTE — PROCEDURES
Pulmonary Function Test  Performed by: Treasure Royal MD  Authorized by: Treasure Royal MD      Pre Drug    FVC: 69%   FEV1: 69%   FEV1/FVC: 78%   T%   DLCO: 61%    Interpretation   Spirometry   Spirometry shows mild restriction.   Review of FVL curve   Effort is reduced.   Lung Volume Measurements  Measurements show: reduced lung volumes consistent with restriction.   Diffusion Capacity  The patient's diffusion capacity is mildly reduced.

## 2020-10-27 ENCOUNTER — LAB (OUTPATIENT)
Dept: LAB | Facility: HOSPITAL | Age: 65
End: 2020-10-27

## 2020-10-27 DIAGNOSIS — R19.4 CHANGE IN BOWEL HABITS: ICD-10-CM

## 2020-10-27 DIAGNOSIS — K59.04 CHRONIC IDIOPATHIC CONSTIPATION: ICD-10-CM

## 2020-10-27 DIAGNOSIS — R19.7 DIARRHEA, UNSPECIFIED TYPE: ICD-10-CM

## 2020-10-27 DIAGNOSIS — R13.19 OTHER DYSPHAGIA: ICD-10-CM

## 2020-10-27 PROCEDURE — 83516 IMMUNOASSAY NONANTIBODY: CPT | Performed by: INTERNAL MEDICINE

## 2020-10-27 PROCEDURE — 86003 ALLG SPEC IGE CRUDE XTRC EA: CPT

## 2020-10-27 PROCEDURE — 86003 ALLG SPEC IGE CRUDE XTRC EA: CPT | Performed by: INTERNAL MEDICINE

## 2020-10-27 PROCEDURE — 86008 ALLG SPEC IGE RECOMB EA: CPT

## 2020-10-27 PROCEDURE — 83516 IMMUNOASSAY NONANTIBODY: CPT

## 2020-10-27 PROCEDURE — 86671 FUNGUS NES ANTIBODY: CPT

## 2020-10-27 PROCEDURE — 36415 COLL VENOUS BLD VENIPUNCTURE: CPT | Performed by: INTERNAL MEDICINE

## 2020-10-27 PROCEDURE — 86255 FLUORESCENT ANTIBODY SCREEN: CPT

## 2020-10-28 LAB
GLIADIN PEPTIDE IGA SER-ACNC: 7 UNITS (ref 0–19)
GLIADIN PEPTIDE IGG SER-ACNC: 2 UNITS (ref 0–19)
TTG IGA SER-ACNC: <2 U/ML (ref 0–3)
TTG IGG SER-ACNC: 2 U/ML (ref 0–5)

## 2020-10-29 LAB
BAKER'S YEAST IGG QN IA: 33 UNITS (ref 0–50)
CHITOBIOSIDE IGA SERPL IA-ACNC: 33 UNITS (ref 0–90)
LABORATORY COMMENT REPORT: NORMAL
LAMINARIBIOSIDE IGG SERPL IA-ACNC: 48 UNITS (ref 0–60)
MANNOBIOSIDE IGG SERPL IA-ACNC: 48 UNITS (ref 0–100)
P-ANCA ATYPICAL SER QL IF: NEGATIVE

## 2020-10-30 LAB
ALPHA-GAL IGE QN: <0.1 KU/L
BEEF IGE QN: <0.1 KU/L
CODFISH IGE QN: <0.1 KU/L
CONV CLASS DESCRIPTION: NORMAL
COW MILK IGE QN: <0.1 KU/L
DEPRECATED BEEF IGE RAST QL: 0
DEPRECATED LAMB IGE RAST QL: 0
DEPRECATED PORK IGE RAST QL: 0
EGG WHITE IGE QN: <0.1 KU/L
GLUTEN IGE QN: <0.1 KU/L
HAZELNUT IGE QN: <0.1 KU/L
LAMB IGE QN: <0.1 KU/L
PEANUT IGE QN: <0.1 KU/L
PORK IGE QN: <0.1 KU/L
SCALLOP IGE QN: <0.1 KU/L
SESAME SEED IGE QN: <0.1 KU/L
SHRIMP IGE QN: <0.1 KU/L
SOYBEAN IGE QN: <0.1 KU/L
WALNUT IGE QN: <0.1 KU/L
WHEAT IGE QN: <0.1 KU/L

## 2020-11-10 ENCOUNTER — OFFICE VISIT (OUTPATIENT)
Dept: GASTROENTEROLOGY | Facility: CLINIC | Age: 65
End: 2020-11-10

## 2020-11-10 VITALS
DIASTOLIC BLOOD PRESSURE: 84 MMHG | WEIGHT: 174.8 LBS | BODY MASS INDEX: 28.09 KG/M2 | HEIGHT: 66 IN | SYSTOLIC BLOOD PRESSURE: 128 MMHG | HEART RATE: 84 BPM

## 2020-11-10 DIAGNOSIS — K59.04 CHRONIC IDIOPATHIC CONSTIPATION: Primary | ICD-10-CM

## 2020-11-10 PROCEDURE — 99214 OFFICE O/P EST MOD 30 MIN: CPT | Performed by: INTERNAL MEDICINE

## 2020-11-10 RX ORDER — LACTULOSE 10 G/15ML
20 SOLUTION ORAL DAILY
Status: DISCONTINUED | OUTPATIENT
Start: 2020-11-10 | End: 2021-11-16

## 2020-11-10 NOTE — PROGRESS NOTES
"[facility]      Chief Complaint:   Chief Complaint   Patient presents with   • Diarrhea   • Constipation   • Difficulty Swallowing       Subjective     HPI:   *Patient for follow-up on constipation and diarrhea.  Patient is stating that her constipation is the worst part of her abdominal discomfort at this time.  Patient was unable to get Trulance secondary to insurance discussed with patient that we will write for lactulose as needed.  Will have patient follow-up as needed.    Plan; we will write for lactulose daily as needed.  Patient also will follow up as needed if dysphagia returns patient will follow up    Past Medical History:   Past Medical History:   Diagnosis Date   • Anesthesia complication     states she \"stopped breathing during her last procedure\"   • Arthritis    • Elevated cholesterol    • Endometriosis    • Falls    • GERD (gastroesophageal reflux disease)    • Osteoporosis    • Scoliosis of thoracolumbar spine    • Seasonal rhinitis    • Sinusitis        Past Surgical History:  Past Surgical History:   Procedure Laterality Date   • APPENDECTOMY     • COLONOSCOPY     • COLONOSCOPY N/A 6/30/2020    Procedure: COLONOSCOPY;  Surgeon: Evan Rondon MD;  Location: [facility] ENDOSCOPY;  Service: Gastroenterology;  Laterality: N/A;   • ENDOSCOPY N/A 10/16/2019    Procedure: ESOPHAGOGASTRODUODENOSCOPY;  Surgeon: Evan Rondon MD;  Location: [facility] ENDOSCOPY;  Service: Gastroenterology   • ENDOSCOPY N/A 6/30/2020    Procedure: ESOPHAGOGASTRODUODENOSCOPY;  Surgeon: Evan Rondon MD;  Location: [facility] ENDOSCOPY;  Service: Gastroenterology;  Laterality: N/A;  savory dilation 48-54   • FOREARM SURGERY     • HYSTERECTOMY     • SALPINGO OOPHORECTOMY     • UPPER GASTROINTESTINAL ENDOSCOPY  10/16/2019   • UPPER GASTROINTESTINAL ENDOSCOPY  06/30/2020   • WRIST FRACTURE SURGERY Right        Family History:  Family History   Problem Relation Age of Onset   • Heart failure Mother    • " Thyroid disease Mother    • Ulcerative colitis Father        Social History:   reports that she quit smoking about 18 years ago. She quit after 20.00 years of use. She has never used smokeless tobacco. She reports that she does not drink alcohol or use drugs.    Medications:   Prior to Admission medications    Medication Sig Start Date End Date Taking? Authorizing Provider   acetaminophen-codeine (TYLENOL #3) 300-30 MG per tablet Take 1 tablet by mouth 2 (Two) Times a Day.  Patient taking differently: Take 1 tablet by mouth 2 (Two) Times a Day As Needed. 4/6/20  Yes Jayleen Maier APRN   albuterol (PROVENTIL) (2.5 MG/3ML) 0.083% nebulizer solution Take 2.5 mg by nebulization Every 4 (Four) Hours As Needed for Wheezing. 1/14/20  Yes Jayleen Maier APRN   albuterol sulfate  (90 Base) MCG/ACT inhaler Inhale 2 puffs Every 4 (Four) Hours As Needed for Wheezing.   Yes ProviderRoosevelt MD   Biotin 1 MG capsule Take 1 capsule by mouth Daily.   Yes Provider, MD Roosevelt   EPINEPHrine (EPIPEN) 0.3 MG/0.3ML solution auto-injector injection INJECT INTRAMUSCULARLY AS DIRECTED 5/5/17  Yes ProviderRoosevelt MD   famotidine (PEPCID) 20 MG tablet Take 20 mg by mouth Daily. 5/26/20  Yes ProviderRoosevelt MD   fexofenadine (Allegra Allergy) 180 MG tablet Take 1 tablet by mouth Daily. 10/21/20  Yes Treasure Royal MD   fluticasone (FLONASE) 50 MCG/ACT nasal spray 2 sprays into each nostril daily 10/13/20  Yes Alejandro Gonzalez MD   fluticasone (FLOVENT HFA) 44 MCG/ACT inhaler Inhale 2 puffs 2 (Two) Times a Day. 10/21/20  Yes Treasure Royal MD   ipratropium-albuterol (COMBIVENT RESPIMAT)  MCG/ACT inhaler Inhale 1 puff 4 (Four) Times a Day As Needed for Shortness of Air. 1/14/20  Yes Jayleen Maier APRN   olopatadine (PATANASE) 0.6 % solution nasal solution 2 sprays by Each Nare route 2 (Two) Times a Day. 10/7/20  Yes Alejandro Gonzalez MD   pantoprazole (PROTONIX) 40 MG EC tablet Take 1  "tablet by mouth Daily. 1/28/20  Yes Evan Rondon MD   Plecanatide (Trulance) 3 MG tablet Take 1 tablet by mouth Daily. 10/13/20  Yes Evan Rondon MD   traMADol (ULTRAM) 50 MG tablet Take 1 tablet by mouth Every 6 (Six) Hours As Needed for Moderate Pain . 2/24/20  Yes Jayleen Maier APRN       Allergies:  Sulfa antibiotics, Augmentin [amoxicillin-pot clavulanate], and Cefuroxime    ROS:    Review of Systems   Constitutional: Negative for activity change, appetite change, chills, diaphoresis, fatigue, fever and unexpected weight change.   HENT: Negative for sore throat and trouble swallowing.    Respiratory: Negative for shortness of breath.    Gastrointestinal: Positive for constipation. Negative for abdominal distention, abdominal pain, anal bleeding, blood in stool, diarrhea, nausea, rectal pain and vomiting.   Endocrine: Negative for polydipsia, polyphagia and polyuria.   Genitourinary: Negative for difficulty urinating.   Musculoskeletal: Negative for arthralgias.   Skin: Negative for pallor.   Allergic/Immunologic: Negative for food allergies.   Neurological: Negative for weakness and light-headedness.   Psychiatric/Behavioral: Negative for behavioral problems.     Objective     Blood pressure 128/84, pulse 84, height 166.4 cm (65.5\"), weight 79.3 kg (174 lb 12.8 oz).    Physical Exam  Constitutional:       General: She is not in acute distress.     Appearance: She is well-developed. She is not diaphoretic.   HENT:      Head: Normocephalic and atraumatic.   Cardiovascular:      Rate and Rhythm: Normal rate and regular rhythm.      Heart sounds: Normal heart sounds. No murmur. No friction rub. No gallop.    Pulmonary:      Effort: No respiratory distress.      Breath sounds: Normal breath sounds. No wheezing or rales.   Chest:      Chest wall: No tenderness.   Abdominal:      General: Bowel sounds are normal. There is no distension.      Palpations: Abdomen is soft. There is no mass.      Tenderness: " There is no abdominal tenderness. There is no guarding or rebound.      Hernia: No hernia is present.   Musculoskeletal: Normal range of motion.   Skin:     General: Skin is warm and dry.      Coloration: Skin is not pale.      Findings: No erythema or rash.   Neurological:      Mental Status: She is alert and oriented to person, place, and time.   Psychiatric:         Behavior: Behavior normal.         Thought Content: Thought content normal.         Judgment: Judgment normal.          Assessment/Plan   Diagnoses and all orders for this visit:    1. Chronic idiopathic constipation (Primary)  -     lactulose (CHRONULAC) 10 GM/15ML solution 20 g        * Surgery not found *     Diagnosis Plan   1. Chronic idiopathic constipation  lactulose (CHRONULAC) 10 GM/15ML solution 20 g       Anticipated Surgical Procedure:  No orders of the defined types were placed in this encounter.      The risks, benefits, and alternatives of this procedure have been discussed with the patient or the responsible party- the patient understands and agrees to proceed.

## 2020-11-10 NOTE — PATIENT INSTRUCTIONS
"BMI for Adults  What is BMI?  Body mass index (BMI) is a number that is calculated from a person's weight and height. BMI can help estimate how much of a person's weight is composed of fat. BMI does not measure body fat directly. Rather, it is an alternative to procedures that directly measure body fat, which can be difficult and expensive.  BMI can help identify people who may be at higher risk for certain medical problems.  What are BMI measurements used for?  BMI is used as a screening tool to identify possible weight problems. It helps determine whether a person is obese, overweight, a healthy weight, or underweight.  BMI is useful for:  · Identifying a weight problem that may be related to a medical condition or may increase the risk for medical problems.  · Promoting changes, such as changes in diet and exercise, to help reach a healthy weight. BMI screening can be repeated to see if these changes are working.  How is BMI calculated?  BMI involves measuring your weight in relation to your height. Both height and weight are measured, and the BMI is calculated from those numbers. This can be done either in English (U[place] or metric measurements. Note that charts and online BMI calculators are available to help you find your BMI quickly and easily without having to do these calculations yourself.  To calculate your BMI in English ([place] measurements:    1. Measure your weight in pounds (lb).  2. Multiply the number of pounds by 703.  ? For example, for a person who weighs 180 lb, multiply that number by 703, which equals 126,540.  3. Measure your height in inches. Then multiply that number by itself to get a measurement called \"inches squared.\"  ? For example, for a person who is 70 inches tall, the \"inches squared\" measurement is 70 inches x 70 inches, which equals 4,900 inches squared.  4. Divide the total from step 2 (number of lb x 703) by the total from step 3 (inches squared): 126,540 ÷ 4,900 = 25.8. This is " "your BMI.  To calculate your BMI in metric measurements:  1. Measure your weight in kilograms (kg).  2. Measure your height in meters (m). Then multiply that number by itself to get a measurement called \"meters squared.\"  ? For example, for a person who is 1.75 m tall, the \"meters squared\" measurement is 1.75 m x 1.75 m, which is equal to 3.1 meters squared.  3. Divide the number of kilograms (your weight) by the meters squared number. In this example: 70 ÷ 3.1 = 22.6. This is your BMI.  What do the results mean?  BMI charts are used to identify whether you are underweight, normal weight, overweight, or obese. The following guidelines will be used:  · Underweight: BMI less than 18.5.  · Normal weight: BMI between 18.5 and 24.9.  · Overweight: BMI between 25 and 29.9.  · Obese: BMI of 30 or above.  Keep these notes in mind:  · Weight includes both fat and muscle, so someone with a muscular build, such as an athlete, may have a BMI that is higher than 24.9. In cases like these, BMI is not an accurate measure of body fat.  · To determine if excess body fat is the cause of a BMI of 25 or higher, further assessments may need to be done by a health care provider.  · BMI is usually interpreted in the same way for men and women.  Where to find more information  For more information about BMI, including tools to quickly calculate your BMI, go to these websites:  · Centers for Disease Control and Prevention: www.cdc.gov  · American Heart Association: www.heart.org  · National Heart, Lung, and Blood [place]: www.nhlbi.nih.gov  Summary  · Body mass index (BMI) is a number that is calculated from a person's weight and height.  · BMI may help estimate how much of a person's weight is composed of fat. BMI can help identify those who may be at higher risk for certain medical problems.  · BMI can be measured using English measurements or metric measurements.  · BMI charts are used to identify whether you are underweight, normal " weight, overweight, or obese.  This information is not intended to replace advice given to you by your health care provider. Make sure you discuss any questions you have with your health care provider.  Document Released: 08/29/2005 Document Revised: 09/09/2020 Document Reviewed: 07/17/2020  Elsevier Patient Education © 2020 [employer]

## 2020-11-17 RX ORDER — FAMOTIDINE 20 MG/1
20 TABLET, FILM COATED ORAL DAILY
Qty: 30 TABLET | Refills: 3 | Status: SHIPPED | OUTPATIENT
Start: 2020-11-17 | End: 2021-03-29

## 2020-11-18 ENCOUNTER — CLINICAL SUPPORT (OUTPATIENT)
Dept: AUDIOLOGY | Facility: CLINIC | Age: 65
End: 2020-11-18

## 2020-11-18 ENCOUNTER — OFFICE VISIT (OUTPATIENT)
Dept: OTOLARYNGOLOGY | Facility: CLINIC | Age: 65
End: 2020-11-18

## 2020-11-18 VITALS — TEMPERATURE: 97.2 F | WEIGHT: 176 LBS | BODY MASS INDEX: 28.28 KG/M2 | HEIGHT: 66 IN

## 2020-11-18 DIAGNOSIS — J34.3 NASAL TURBINATE HYPERTROPHY: ICD-10-CM

## 2020-11-18 DIAGNOSIS — J34.2 NASAL SEPTAL DEVIATION: ICD-10-CM

## 2020-11-18 DIAGNOSIS — J34.89 NASAL VALVE STENOSIS: ICD-10-CM

## 2020-11-18 DIAGNOSIS — J34.89 NASAL VALVE BLOCKAGE: Primary | ICD-10-CM

## 2020-11-18 DIAGNOSIS — H90.3 SENSORINEURAL HEARING LOSS, BILATERAL: Primary | ICD-10-CM

## 2020-11-18 PROCEDURE — 99213 OFFICE O/P EST LOW 20 MIN: CPT | Performed by: OTOLARYNGOLOGY

## 2020-11-18 PROCEDURE — 92567 TYMPANOMETRY: CPT | Performed by: AUDIOLOGIST

## 2020-11-18 PROCEDURE — 92557 COMPREHENSIVE HEARING TEST: CPT | Performed by: AUDIOLOGIST

## 2020-11-18 RX ORDER — OLOPATADINE HYDROCHLORIDE 665 UG/1
2 SPRAY NASAL 2 TIMES DAILY
Qty: 30 G | Refills: 11 | Status: SHIPPED | OUTPATIENT
Start: 2020-11-18 | End: 2020-12-16 | Stop reason: SDUPTHER

## 2020-11-18 RX ORDER — FLUTICASONE PROPIONATE 50 MCG
SPRAY, SUSPENSION (ML) NASAL
Qty: 16 ML | Refills: 5 | Status: SHIPPED | OUTPATIENT
Start: 2020-11-18 | End: 2021-07-01 | Stop reason: SDUPTHER

## 2020-11-18 RX ORDER — DICYCLOMINE HCL 20 MG
TABLET ORAL
COMMUNITY
Start: 2020-11-09 | End: 2021-09-01 | Stop reason: SDUPTHER

## 2020-11-18 NOTE — PROGRESS NOTES
STANDARD AUDIOMETRIC EVALUATION      Name:  Genna Garcia  :  1955  Age:  65 y.o.  Date of Evaluation:  2020      HISTORY    Reason for visit:  Genna Garcia is seen today for a hearing test at the request of Dr. Alejandro Gonzalez.  Patient reports it feels like she has fluid in her ears.  She states she has decreased hearing, and she has to pop her ears in order to hear better.  She states she wears hearing aids in both ears which helps her hear better.      EVALUATION    See Audiogram    RESULTS        Otoscopy and Tympanometry 226 Hz :  Right Ear:  Otoscopy:  Clear ear canal          Tympanometry:  Middle ear function within normal limits    Left Ear:   Otoscopy:  Clear ear canal        Tympanometry:  Middle ear function within normal limits    Test technique:  Standard Audiometry     Pure Tone Audiometry:   Patient responded to pure tones at 35-65 dB for 250-8000 Hz in right ear, and at 45-60 dB for 250-8000 Hz in left ear.       Speech Audiometry:        Right Ear:  Speech Reception Threshold (SRT) was obtained at 45 dBHL                 Speech Discrimination scores were 100% in quiet when words were presented at 80 dBHL       Left Ear:  Speech Reception Threshold (SRT) was obtained at 40 dBHL                 Speech Discrimination scores were 56% in quiet when words were presented at 80 dBHL    Reliability:   good    IMPRESSIONS:  1.  Tympanometry results are consistent with Middle ear function within normal limits in both ears.  2.  Pure tone results are consistent with moderate flat sensorineural hearing loss  for both ears.       RECOMMENDATIONS:  Patient is seeing the Ear Nose and Throat physician immediately following this examination.  It was a pleasure seeing Genna Garcia in Audiology today.  We would be happy to do further testing or discuss these test as necessary.          This document has been electronically signed by Zully Esteves MS CCC-TOMMY on  November 18, 2020 14:12 CST       Zully Esteves MS CCC-A  Licensed Audiologist

## 2020-11-18 NOTE — PROGRESS NOTES
Subjective   Genna Garcia is a 65 y.o. female.   Nose and ears follow-up    History of Present Illness     Patient has no specific complaints regarding her throat says her hearing is longstanding worse for years on the left versus the right she is wearing hearing aids for 8 to 10 years she is not have any disabling tinnitus or vertigo no ear drainage or nose no ear pain she says it nasal sprays helping more with the Patanase and the Flonase she takes a Flonase midday using Patanase twice a day I gave her some cards to help cut down the cost  I discussed that her lung situation such that we want avoid surgery because of that reason on her nose  The following portions of the patient's history were reviewed and updated as appropriate: allergies, current medications, past family history, past medical history, past social history, past surgical history and problem list.      Current Outpatient Medications:   •  acetaminophen-codeine (TYLENOL #3) 300-30 MG per tablet, Take 1 tablet by mouth 2 (Two) Times a Day. (Patient taking differently: Take 1 tablet by mouth 2 (Two) Times a Day As Needed.), Disp: 60 tablet, Rfl: 0  •  albuterol (PROVENTIL) (2.5 MG/3ML) 0.083% nebulizer solution, Take 2.5 mg by nebulization Every 4 (Four) Hours As Needed for Wheezing., Disp: 120 mL, Rfl: 12  •  albuterol sulfate  (90 Base) MCG/ACT inhaler, Inhale 2 puffs Every 4 (Four) Hours As Needed for Wheezing., Disp: , Rfl:   •  Biotin 1 MG capsule, Take 1 capsule by mouth Daily., Disp: , Rfl:   •  Calcium-Magnesium-Vitamin D - MG-MG-UNIT tablet sustained-release 24 hour, Take 2 tablets by mouth Daily., Disp: , Rfl:   •  EPINEPHrine (EPIPEN) 0.3 MG/0.3ML solution auto-injector injection, INJECT INTRAMUSCULARLY AS DIRECTED, Disp: , Rfl: 1  •  famotidine (PEPCID) 20 MG tablet, Take 1 tablet by mouth Daily., Disp: 30 tablet, Rfl: 3  •  fexofenadine (Allegra Allergy) 180 MG tablet, Take 1 tablet by mouth Daily., Disp: 30  tablet, Rfl: 5  •  fluticasone (FLONASE) 50 MCG/ACT nasal spray, 2 sprays into each nostril daily, Disp: 16 mL, Rfl: 5  •  fluticasone (FLOVENT HFA) 44 MCG/ACT inhaler, Inhale 2 puffs 2 (Two) Times a Day., Disp: 1 inhaler, Rfl: 11  •  ipratropium-albuterol (COMBIVENT RESPIMAT)  MCG/ACT inhaler, Inhale 1 puff 4 (Four) Times a Day As Needed for Shortness of Air., Disp: 4 g, Rfl: 11  •  olopatadine (PATANASE) 0.6 % solution nasal solution, 2 sprays by Each Nare route 2 (Two) Times a Day., Disp: 30 g, Rfl: 11  •  pantoprazole (PROTONIX) 40 MG EC tablet, Take 1 tablet by mouth Daily., Disp: 30 tablet, Rfl: 5  •  traMADol (ULTRAM) 50 MG tablet, Take 1 tablet by mouth Every 6 (Six) Hours As Needed for Moderate Pain ., Disp: 30 tablet, Rfl: 0  •  dicyclomine (BENTYL) 20 MG tablet, , Disp: , Rfl:   •  Plecanatide (Trulance) 3 MG tablet, Take 1 tablet by mouth Daily., Disp: 90 tablet, Rfl: 5    Current Facility-Administered Medications:   •  lactulose (CHRONULAC) 10 GM/15ML solution 20 g, 20 g, Oral, Daily, Evan Rondon MD    Allergies   Allergen Reactions   • Sulfa Antibiotics Other (See Comments)     Low platelets   • Augmentin [Amoxicillin-Pot Clavulanate] Diarrhea   • Cefuroxime Diarrhea             Review of Systems   Constitutional: Negative for fever.   HENT: Positive for congestion. Negative for ear discharge, ear pain and sore throat.    Allergic/Immunologic: Positive for environmental allergies.   Neurological: Positive for headaches. Negative for dizziness and facial asymmetry.           Objective   Physical Exam  Vitals signs and nursing note reviewed.   HENT:      Head: Normocephalic.      Right Ear: Ear canal normal.      Nose:      Comments: Bilateral deviated septum no pus blood or irritation turbinates moderately swollen     Mouth/Throat:      Pharynx: Oropharynx is clear.   Cardiovascular:      Pulses: Normal pulses.   Pulmonary:      Effort: Pulmonary effort is normal.   Skin:     General: Skin is  warm.   Neurological:      General: No focal deficit present.   Psychiatric:         Mood and Affect: Mood normal.           Audiogram was reviewed with the patient actual tracing shown her showing symmetric SRT's but decreased speech to scram of the left the right and pure tones are equal tympanograms are normal tracings were actually reviewed with her and shown to her  Assessment/Plan   Diagnoses and all orders for this visit:    1. Nasal valve blockage (Primary)    2. Nasal turbinate hypertrophy    3. Nasal septal deviation    4. Nasal valve stenosis    She is can alter the way she is using nasal spray use her closer to bedtime for both sprays to see if we can decrease her symptoms or nasal obstruction avoid surgery because of her lung problems  And offered MRI because asymmetry but she says she had a long time does not want to pursue that  She is holding off on income surgery because of her lung problems with her nose that we discussed in the past suggested follow-up with her hearing 6 months call if any worsening but she does not have any disabling tinnitus or vertigo  Patient is to call if she is not feeling the Patanase is working well enough and will consider at still a nasal spray she is already been on Atrovent but she feels like she is doing better I suggest she use the Flonase closer to bedtime otherwise follow-up in 6 months unless problems or questions explained proper use and technique

## 2020-12-01 RX ORDER — PANTOPRAZOLE SODIUM 40 MG/1
40 TABLET, DELAYED RELEASE ORAL DAILY
Qty: 30 TABLET | Refills: 5 | Status: SHIPPED | OUTPATIENT
Start: 2020-12-01 | End: 2021-02-12 | Stop reason: SDUPTHER

## 2020-12-02 ENCOUNTER — OFFICE VISIT (OUTPATIENT)
Dept: PULMONOLOGY | Facility: CLINIC | Age: 65
End: 2020-12-02

## 2020-12-02 VITALS
HEART RATE: 88 BPM | SYSTOLIC BLOOD PRESSURE: 130 MMHG | DIASTOLIC BLOOD PRESSURE: 76 MMHG | WEIGHT: 174 LBS | HEIGHT: 66 IN | OXYGEN SATURATION: 98 % | BODY MASS INDEX: 27.97 KG/M2

## 2020-12-02 DIAGNOSIS — J47.9 BRONCHIECTASIS WITHOUT ACUTE EXACERBATION (HCC): Primary | ICD-10-CM

## 2020-12-02 PROCEDURE — 99214 OFFICE O/P EST MOD 30 MIN: CPT | Performed by: INTERNAL MEDICINE

## 2020-12-02 RX ORDER — POTASSIUM IODIDE 1 G/ML
600 SOLUTION ORAL 3 TIMES DAILY
Qty: 15 ML | Refills: 10 | Status: SHIPPED | OUTPATIENT
Start: 2020-12-02 | End: 2022-10-28

## 2020-12-02 NOTE — PROGRESS NOTES
Pulmonary Office Follow-up    Subjective     Genna Garcia is seen today at the office for   Chief Complaint   Patient presents with   • Shortness of Breath   • Cystic Fibrosis         HPI  Genna Garcia is a 65 y.o. female with a PMH significant for chronic cough.  This is felt to be multifactorial.  She has some degree of pulmonary fibrosis, bronchiectasis, postnasal drip, and severe acid reflux disease.  Flovent 44 has not helped at all so she stopped it about a week ago.  Her sputum is incredibly thick to where she when she coughs it will make her vomit at times.  It is always nonpurulent.      Tobacco use history:  She smoked most of her adult life quit in about 2002    Patient Active Problem List   Diagnosis   • Scoliosis of thoracolumbar spine   • Pleuritic pain   • Other dysphagia   • Acute sinusitis   • Acute upper respiratory infection   • Candidiasis of vagina   • Pain in joint of right shoulder   • Change in bowel habits       Review of Systems  Review of Systems  As described in the HPI. Otherwise, remainder of ROS (14 systems) were negative.    Medications, Allergies, Social, and Family Histories reviewed as per EMR.    Objective     Vitals:    12/02/20 0805   BP: 130/76   Pulse: 88   SpO2: 98%         12/02/20  0805   Weight: 78.9 kg (174 lb)     [unfilled]  Physical Exam  Constitutional:       General: She is not in acute distress.     Appearance: She is not ill-appearing, toxic-appearing or diaphoretic.   HENT:      Head: Atraumatic.      Nose: Nose normal.      Mouth/Throat:      Comments: Very dry mucosa but no lesions no evidence of thrush  Cardiovascular:      Rate and Rhythm: Regular rhythm.      Heart sounds: No gallop.    Pulmonary:      Breath sounds: No stridor. No wheezing.      Comments: Fibrotic rales in the bases bilaterally  Abdominal:      Palpations: Abdomen is soft.   Musculoskeletal:         General: No swelling.      Comments: No clubbing of the  digits   Lymphadenopathy:      Cervical: No cervical adenopathy.   Skin:     General: Skin is warm.      Findings: No rash.   Neurological:      General: No focal deficit present.      Mental Status: She is alert and oriented to person, place, and time.      Cranial Nerves: No cranial nerve deficit.   Psychiatric:         Mood and Affect: Mood normal.         Behavior: Behavior normal.         Thought Content: Thought content normal.         Judgment: Judgment normal.             Assessment/Plan     Diagnoses and all orders for this visit:    1. Bronchiectasis without acute exacerbation (CMS/[facility]) (Primary)  -     ipratropium (ATROVENT) 0.02 % nebulizer solution; Take 2.5 mL by nebulization 4 (Four) Times a Day As Needed for Wheezing or Shortness of Air.  Dispense: 37.5 mL; Refill: 3  -     Potassium Iodide, Expectorant, (SSKI) 1 GM/ML solution; Take 0.6 mL by mouth 3 (Three) Times a Day.  Dispense: 15 mL; Refill: 10    I am going to adjust her medications to include Breo 100.  I gave her a 2-week sample and told her to call us if she felt that this was helpful.  If not we will discontinue it.  I have started her on SSKI 600 mg 3 times a day for 5 days out of the month to help thin her secretions.  Albuterol makes her so jittery I switched her nebulizers to Atrovent only and she can use this every 4 hours if needed.  I have asked her to get a Prevnar 13 shot from her pharmacist.  She is already had her flu vaccine.    2.  Postnasal drip: She is going to continue on her Flonase and Allegra    3.  Severe acid reflux disease she is keeping her bed elevated at the head using Pepcid and Protonix    4.  Irritable bowel syndrome    5.  Positive THAIS of questionable significance    6.  Ex-smoker stopping in 2002      Patient's Body mass index is 28.51 kg/m². BMI is within normal parameters. No follow-up required..        Return in about 6 weeks (around 1/13/2021).          This document has been electronically signed by  Jorgito Evans MD on December 2, 2020 08:31 CST      Dictated using Dragon

## 2020-12-08 ENCOUNTER — DOCUMENTATION (OUTPATIENT)
Dept: PULMONOLOGY | Facility: CLINIC | Age: 65
End: 2020-12-08

## 2020-12-08 ENCOUNTER — TELEPHONE (OUTPATIENT)
Dept: CARDIOLOGY | Facility: CLINIC | Age: 65
End: 2020-12-08

## 2020-12-08 DIAGNOSIS — R06.02 SHORTNESS OF BREATH: Primary | ICD-10-CM

## 2020-12-08 NOTE — TELEPHONE ENCOUNTER
Was given a sample of breo, was told if it worked to call back to the office and he would call in a script. She uses [facility] pharmacy in [place].   Please call her and let her know if this is possible

## 2020-12-16 RX ORDER — OLOPATADINE HYDROCHLORIDE 665 UG/1
2 SPRAY NASAL 2 TIMES DAILY
Qty: 30 G | Refills: 5 | Status: SHIPPED | OUTPATIENT
Start: 2020-12-16 | End: 2021-07-01 | Stop reason: SDUPTHER

## 2021-01-06 DIAGNOSIS — R06.02 SHORTNESS OF BREATH: Primary | ICD-10-CM

## 2021-01-11 ENCOUNTER — PRIOR AUTHORIZATION (OUTPATIENT)
Dept: PULMONOLOGY | Facility: CLINIC | Age: 66
End: 2021-01-11

## 2021-01-11 NOTE — TELEPHONE ENCOUNTER
A prior authorization for Breo has been filled out and faxed using [employer].  Ref. # [id number].

## 2021-01-12 ENCOUNTER — PRIOR AUTHORIZATION (OUTPATIENT)
Dept: PULMONOLOGY | Facility: CLINIC | Age: 66
End: 2021-01-12

## 2021-01-12 NOTE — TELEPHONE ENCOUNTER
A P.A. for Gareth was redone and sent to [facility].  ID# [id number]    phone #  [phone].    I left a message for Ms. Garcia that the PA has been redone and sent to the correct pharmacy.

## 2021-02-05 ENCOUNTER — OFFICE VISIT (OUTPATIENT)
Dept: FAMILY MEDICINE CLINIC | Facility: CLINIC | Age: 66
End: 2021-02-05

## 2021-02-05 ENCOUNTER — TELEPHONE (OUTPATIENT)
Dept: FAMILY MEDICINE CLINIC | Facility: CLINIC | Age: 66
End: 2021-02-05

## 2021-02-05 VITALS
DIASTOLIC BLOOD PRESSURE: 86 MMHG | WEIGHT: 174.25 LBS | TEMPERATURE: 97.3 F | SYSTOLIC BLOOD PRESSURE: 134 MMHG | HEART RATE: 105 BPM | HEIGHT: 66 IN | OXYGEN SATURATION: 96 % | BODY MASS INDEX: 28 KG/M2 | RESPIRATION RATE: 20 BRPM

## 2021-02-05 DIAGNOSIS — R39.9 UTI SYMPTOMS: ICD-10-CM

## 2021-02-05 DIAGNOSIS — N30.01 ACUTE CYSTITIS WITH HEMATURIA: Primary | ICD-10-CM

## 2021-02-05 LAB
CLARITY, POC: CLEAR
COLOR UR: YELLOW
LEUKOCYTE EST, POC: ABNORMAL
PH UR: 6 [PH] (ref 5–8)
PROT UR STRIP-MCNC: ABNORMAL MG/DL
RBC # UR STRIP: ABNORMAL /UL
SP GR UR: 1.02 (ref 1–1.03)

## 2021-02-05 PROCEDURE — 87186 SC STD MICRODIL/AGAR DIL: CPT | Performed by: NURSE PRACTITIONER

## 2021-02-05 PROCEDURE — 99213 OFFICE O/P EST LOW 20 MIN: CPT | Performed by: NURSE PRACTITIONER

## 2021-02-05 PROCEDURE — 81003 URINALYSIS AUTO W/O SCOPE: CPT | Performed by: NURSE PRACTITIONER

## 2021-02-05 PROCEDURE — 87077 CULTURE AEROBIC IDENTIFY: CPT | Performed by: NURSE PRACTITIONER

## 2021-02-05 PROCEDURE — 87086 URINE CULTURE/COLONY COUNT: CPT | Performed by: NURSE PRACTITIONER

## 2021-02-05 RX ORDER — FLUCONAZOLE 150 MG/1
TABLET ORAL
Qty: 2 TABLET | Refills: 0 | Status: SHIPPED | OUTPATIENT
Start: 2021-02-05 | End: 2021-06-04

## 2021-02-05 RX ORDER — NITROFURANTOIN 25; 75 MG/1; MG/1
100 CAPSULE ORAL 2 TIMES DAILY
Qty: 14 CAPSULE | Refills: 0 | Status: SHIPPED | OUTPATIENT
Start: 2021-02-05 | End: 2021-02-08

## 2021-02-05 RX ORDER — PHENAZOPYRIDINE HYDROCHLORIDE 100 MG/1
100 TABLET, FILM COATED ORAL 2 TIMES DAILY
Qty: 6 TABLET | Refills: 0 | Status: SHIPPED | OUTPATIENT
Start: 2021-02-05 | End: 2021-06-04

## 2021-02-05 NOTE — TELEPHONE ENCOUNTER
Patient aware needs to be seen she has not been in office in over a year.  Advised that [facility] was out of office and offered walk in. Patient stated she would try to make it to the walk in clinic today

## 2021-02-05 NOTE — PATIENT INSTRUCTIONS
Urinary Tract Infection, Adult    A urinary tract infection (UTI) is an infection of any part of the urinary tract. The urinary tract includes the kidneys, ureters, bladder, and urethra. These organs make, store, and get rid of urine in the body.  Your health care provider may use other names to describe the infection. An upper UTI affects the ureters and kidneys (pyelonephritis). A lower UTI affects the bladder (cystitis) and urethra (urethritis).  What are the causes?  Most urinary tract infections are caused by bacteria in your genital area, around the entrance to your urinary tract (urethra). These bacteria grow and cause inflammation of your urinary tract.  What increases the risk?  You are more likely to develop this condition if:  · You have a urinary catheter that stays in place (indwelling).  · You are not able to control when you urinate or have a bowel movement (you have incontinence).  · You are female and you:  ? Use a spermicide or diaphragm for birth control.  ? Have low estrogen levels.  ? Are pregnant.  · You have certain genes that increase your risk (genetics).  · You are sexually active.  · You take antibiotic medicines.  · You have a condition that causes your flow of urine to slow down, such as:  ? An enlarged prostate, if you are male.  ? Blockage in your urethra (stricture).  ? A kidney stone.  ? A nerve condition that affects your bladder control (neurogenic bladder).  ? Not getting enough to drink, or not urinating often.  · You have certain medical conditions, such as:  ? Diabetes.  ? A weak disease-fighting system (immunesystem).  ? Sickle cell disease.  ? Gout.  ? Spinal cord injury.  What are the signs or symptoms?  Symptoms of this condition include:  · Needing to urinate right away (urgently).  · Frequent urination or passing small amounts of urine frequently.  · Pain or burning with urination.  · Blood in the urine.  · Urine that smells bad or unusual.  · Trouble urinating.  · Cloudy  urine.  · Vaginal discharge, if you are female.  · Pain in the abdomen or the lower back.  You may also have:  · Vomiting or a decreased appetite.  · Confusion.  · Irritability or tiredness.  · A fever.  · Diarrhea.  The first symptom in older adults may be confusion. In some cases, they may not have any symptoms until the infection has worsened.  How is this diagnosed?  This condition is diagnosed based on your medical history and a physical exam. You may also have other tests, including:  · Urine tests.  · Blood tests.  · Tests for sexually transmitted infections (STIs).  If you have had more than one UTI, a cystoscopy or imaging studies may be done to determine the cause of the infections.  How is this treated?  Treatment for this condition includes:  · Antibiotic medicine.  · Over-the-counter medicines to treat discomfort.  · Drinking enough water to stay hydrated.  If you have frequent infections or have other conditions such as a kidney stone, you may need to see a health care provider who specializes in the urinary tract (urologist).  In rare cases, urinary tract infections can cause sepsis. Sepsis is a life-threatening condition that occurs when the body responds to an infection. Sepsis is treated in the hospital with IV antibiotics, fluids, and other medicines.  Follow these instructions at home:    Medicines  · Take over-the-counter and prescription medicines only as told by your health care provider.  · If you were prescribed an antibiotic medicine, take it as told by your health care provider. Do not stop using the antibiotic even if you start to feel better.  General instructions  · Make sure you:  ? Empty your bladder often and completely. Do not hold urine for long periods of time.  ? Empty your bladder after sex.  ? Wipe from front to back after a bowel movement if you are female. Use each tissue one time when you wipe.  · Drink enough fluid to keep your urine pale yellow.  · Keep all follow-up  visits as told by your health care provider. This is important.  Contact a health care provider if:  · Your symptoms do not get better after 1-2 days.  · Your symptoms go away and then return.  Get help right away if you have:  · Severe pain in your back or your lower abdomen.  · A fever.  · Nausea or vomiting.  Summary  · A urinary tract infection (UTI) is an infection of any part of the urinary tract, which includes the kidneys, ureters, bladder, and urethra.  · Most urinary tract infections are caused by bacteria in your genital area, around the entrance to your urinary tract (urethra).  · Treatment for this condition often includes antibiotic medicines.  · If you were prescribed an antibiotic medicine, take it as told by your health care provider. Do not stop using the antibiotic even if you start to feel better.  · Keep all follow-up visits as told by your health care provider. This is important.  This information is not intended to replace advice given to you by your health care provider. Make sure you discuss any questions you have with your health care provider.  Document Revised: 12/05/2019 Document Reviewed: 06/27/2019  [employer] Patient Education © 2020 [employer]

## 2021-02-05 NOTE — PROGRESS NOTES
"Chief Complaint  No chief complaint on file.    Subjective          Genna Garcia presents to [facility] for   [facility] Day[facility]Walk [facility]    PCP: MARCELLA Beal    CC: \"UTI\"    Urinary Tract Infection   This is a new problem. The current episode started yesterday. The problem occurs every urination. The problem has been gradually worsening. The quality of the pain is described as aching and burning. The pain is at a severity of 2/10. There has been no fever. Associated symptoms include frequency and urgency. Pertinent negatives include no chills, discharge, flank pain, hematuria, hesitancy, nausea, possible pregnancy, sweats or vomiting. She has tried increased fluids for the symptoms. The treatment provided no relief. There is no history of recurrent UTIs (none in years).       Objective   Vital Signs:   /86 (BP Location: Right arm, Patient Position: Sitting, Cuff Size: Adult)   Pulse 105   Temp 97.3 °F (36.3 °C) (Temporal)   Resp 20   Ht 166.4 cm (65.5\")   Wt 79 kg (174 lb 4 oz)   SpO2 96%   BMI 28.56 kg/m²     Physical Exam  Vitals signs and nursing note reviewed.   Constitutional:       General: She is not in acute distress.     Appearance: Normal appearance.   HENT:      Head: Normocephalic and atraumatic.   Neck:      Musculoskeletal: Neck supple.   Cardiovascular:      Rate and Rhythm: Normal rate and regular rhythm.   Pulmonary:      Effort: Pulmonary effort is normal. No respiratory distress.      Breath sounds: Normal breath sounds. No wheezing, rhonchi or rales.   Abdominal:      General: Bowel sounds are normal.      Palpations: Abdomen is soft.      Tenderness: There is abdominal tenderness (mild suprapubic TTP). There is no right CVA tenderness, left CVA tenderness, guarding or rebound.   Skin:     General: Skin is warm and dry.   Neurological:      General: No focal deficit present.      Mental Status: She is alert and oriented " to person, place, and time.        Result Review :            Recent Results (from the past 24 hour(s))   POCT urinalysis dipstick, automated    Collection Time: 02/05/21 12:09 PM    Specimen: Urine   Result Value Ref Range    Color Yellow Yellow, Straw, Dark Yellow, Celia    Clarity, UA Clear Clear    Specific Gravity  1.025 1.005 - 1.030    pH, Urine 6.0 5.0 - 8.0    Leukocytes Small (1+) (A) Negative    Protein, POC Trace (A) Negative mg/dL    Blood, UA Small (A) Negative              Assessment and Plan    Problem List Items Addressed This Visit        Genitourinary and Reproductive     Acute cystitis with hematuria - Primary    Relevant Medications    nitrofurantoin, macrocrystal-monohydrate, (Macrobid) 100 MG capsule    phenazopyridine (Pyridium) 100 MG tablet      Other Visit Diagnoses     UTI symptoms        Relevant Orders    POCT urinalysis dipstick, automated (Completed)    Urine Culture - Urine, Urine, Clean Catch        Increase fluids, especially water  Empty bladder frequently  Tylenol as needed  Rx for Macrobid, Pyridium, Diflucan if needed for yeast secondary to antibiotics  Urine culture pending--will call if antibiotics need to be changed    See PCP or RTC if symptoms persist/worsen  See PCP for routine f/u visit and management of chronic medical conditions      This document has been electronically signed by MARCELLA Thompson on February 5, 2021 12:15 CST,.

## 2021-02-05 NOTE — TELEPHONE ENCOUNTER
Caller: Genna Garcia    Relationship: Self    Best call back number: [phone]    What medication are you requesting: uti medication     What are your current symptoms: burning with urination, painful urination    How long have you been experiencing symptoms: started yesterday 02/04    Have you had these symptoms before:    [x] Yes  [] No    Have you been treated for these symptoms before:   [x] Yes  [] No    If a prescription is needed, what is your preferred pharmacy and phone number:    [facility] - [place] [address] - [phone]  - [phone] FX  [phone]

## 2021-02-07 LAB — BACTERIA SPEC AEROBE CULT: ABNORMAL

## 2021-02-08 RX ORDER — CIPROFLOXACIN 500 MG/1
500 TABLET, FILM COATED ORAL 2 TIMES DAILY
Qty: 14 TABLET | Refills: 0 | Status: SHIPPED | OUTPATIENT
Start: 2021-02-08 | End: 2021-06-04

## 2021-02-10 ENCOUNTER — OFFICE VISIT (OUTPATIENT)
Dept: PULMONOLOGY | Facility: CLINIC | Age: 66
End: 2021-02-10

## 2021-02-10 VITALS
WEIGHT: 174 LBS | HEART RATE: 102 BPM | BODY MASS INDEX: 27.97 KG/M2 | HEIGHT: 66 IN | DIASTOLIC BLOOD PRESSURE: 86 MMHG | SYSTOLIC BLOOD PRESSURE: 137 MMHG

## 2021-02-10 DIAGNOSIS — J47.9 BRONCHIECTASIS WITHOUT ACUTE EXACERBATION (HCC): Primary | ICD-10-CM

## 2021-02-10 PROCEDURE — 99213 OFFICE O/P EST LOW 20 MIN: CPT | Performed by: INTERNAL MEDICINE

## 2021-02-10 NOTE — PROGRESS NOTES
Pulmonary Office Follow-up this was a phone visit and I received permission from the patient to do a phone visit.  I spent 20 minutes all together with her.    Subjective     Genna Garcia is seen today at the office for   Chief Complaint   Patient presents with   • Follow-up     6 week         HPI  Genna Garcia is a 66 y.o. female with a PMH significant for bronchiectasis.  I am last saw her on 12/2/2020 in the office.  I adjusted her medications at that time.          Patient Active Problem List   Diagnosis   • Scoliosis of thoracolumbar spine   • Pleuritic pain   • Other dysphagia   • Acute sinusitis   • Acute upper respiratory infection   • Candidiasis of vagina   • Pain in joint of right shoulder   • Change in bowel habits   • Acute cystitis with hematuria         Medications, Allergies, Social, and Family Histories reviewed as per EMR.    Objective     Vitals:    02/10/21 1322   BP: 137/86   Pulse: 102         02/10/21  1322   Weight: 78.9 kg (174 lb)       Physical Exam    Due to this being a phone visit no physical exam of course could be performed.    Assessment/Plan     1.  Bronchiectasis    On 12/2/2020 I ordered Breo 100.  She feels that she is doing much better with the Breo and likes it.  We will continue it for now.    SSKI 600 mg 3 times a day for 5 days each month was ordered on 12/2/2020 but she had trouble locating it.  She now has the medication and plans to start it.  She continues to have very thick secretions I believe this may help a great deal.    Albuterol makes her so jittery I switched her nebulizers to Atrovent only also on 12/2/2020 and she can use this every 4 hours if needed.      I have asked my staff to schedule her for a visit again in about 6 months.     2.  Postnasal drip: She is going to continue on her Flonase and Allegra     3.  Severe acid reflux disease she is keeping her bed elevated at the head using Pepcid and Protonix     4.  Irritable bowel  syndrome     5.  Positive THAIS of questionable significance     6.  Ex-smoker stopping in 2002               Return in about 6 months (around 8/10/2021).          This document has been electronically signed by Jorgito Evans MD on February 10, 2021 13:48 CST      Dictated using Dragon

## 2021-02-12 RX ORDER — PANTOPRAZOLE SODIUM 40 MG/1
40 TABLET, DELAYED RELEASE ORAL DAILY
Qty: 30 TABLET | Refills: 5 | Status: SHIPPED | OUTPATIENT
Start: 2021-02-12 | End: 2021-06-14 | Stop reason: SDUPTHER

## 2021-03-29 RX ORDER — FAMOTIDINE 20 MG/1
20 TABLET, FILM COATED ORAL DAILY
Qty: 30 TABLET | Refills: 11 | Status: SHIPPED | OUTPATIENT
Start: 2021-03-29 | End: 2021-09-01 | Stop reason: SDUPTHER

## 2021-05-26 ENCOUNTER — TELEPHONE (OUTPATIENT)
Dept: FAMILY MEDICINE CLINIC | Facility: CLINIC | Age: 66
End: 2021-05-26

## 2021-05-26 NOTE — TELEPHONE ENCOUNTER
Patient left voice message asking for RAJIV George to call her back; tried calling and went straight to voicemail asked for patient to call back

## 2021-06-04 ENCOUNTER — OFFICE VISIT (OUTPATIENT)
Dept: FAMILY MEDICINE CLINIC | Facility: CLINIC | Age: 66
End: 2021-06-04

## 2021-06-04 VITALS
DIASTOLIC BLOOD PRESSURE: 80 MMHG | RESPIRATION RATE: 20 BRPM | HEART RATE: 92 BPM | BODY MASS INDEX: 27.5 KG/M2 | TEMPERATURE: 97.3 F | SYSTOLIC BLOOD PRESSURE: 116 MMHG | OXYGEN SATURATION: 99 % | WEIGHT: 171.13 LBS | HEIGHT: 66 IN

## 2021-06-04 DIAGNOSIS — R30.0 DYSURIA: ICD-10-CM

## 2021-06-04 DIAGNOSIS — N30.01 ACUTE CYSTITIS WITH HEMATURIA: Primary | ICD-10-CM

## 2021-06-04 LAB
BILIRUB BLD-MCNC: NEGATIVE MG/DL
CLARITY, POC: ABNORMAL
COLOR UR: YELLOW
GLUCOSE UR STRIP-MCNC: NEGATIVE MG/DL
KETONES UR QL: NEGATIVE
LEUKOCYTE EST, POC: ABNORMAL
NITRITE UR-MCNC: NEGATIVE MG/ML
PH UR: 6 [PH] (ref 5–8)
PROT UR STRIP-MCNC: NEGATIVE MG/DL
RBC # UR STRIP: ABNORMAL /UL
SP GR UR: 1.03 (ref 1–1.03)
UROBILINOGEN UR QL: NORMAL

## 2021-06-04 PROCEDURE — 81003 URINALYSIS AUTO W/O SCOPE: CPT | Performed by: NURSE PRACTITIONER

## 2021-06-04 PROCEDURE — 87086 URINE CULTURE/COLONY COUNT: CPT | Performed by: NURSE PRACTITIONER

## 2021-06-04 PROCEDURE — 81015 MICROSCOPIC EXAM OF URINE: CPT | Performed by: NURSE PRACTITIONER

## 2021-06-04 PROCEDURE — 99213 OFFICE O/P EST LOW 20 MIN: CPT | Performed by: NURSE PRACTITIONER

## 2021-06-04 RX ORDER — NITROFURANTOIN 25; 75 MG/1; MG/1
100 CAPSULE ORAL 2 TIMES DAILY
Qty: 14 CAPSULE | Refills: 0 | Status: SHIPPED | OUTPATIENT
Start: 2021-06-04 | End: 2021-07-01

## 2021-06-04 RX ORDER — FLUCONAZOLE 150 MG/1
TABLET ORAL
Qty: 2 TABLET | Refills: 0 | Status: SHIPPED | OUTPATIENT
Start: 2021-06-04 | End: 2021-07-01

## 2021-06-04 RX ORDER — PHENAZOPYRIDINE HYDROCHLORIDE 100 MG/1
100 TABLET, FILM COATED ORAL 2 TIMES DAILY
Qty: 6 TABLET | Refills: 0 | Status: SHIPPED | OUTPATIENT
Start: 2021-06-04 | End: 2021-07-01

## 2021-06-04 NOTE — PATIENT INSTRUCTIONS
Urinary Tract Infection, Adult    A urinary tract infection (UTI) is an infection of any part of the urinary tract. The urinary tract includes the kidneys, ureters, bladder, and urethra. These organs make, store, and get rid of urine in the body.  Your health care provider may use other names to describe the infection. An upper UTI affects the ureters and kidneys (pyelonephritis). A lower UTI affects the bladder (cystitis) and urethra (urethritis).  What are the causes?  Most urinary tract infections are caused by bacteria in your genital area, around the entrance to your urinary tract (urethra). These bacteria grow and cause inflammation of your urinary tract.  What increases the risk?  You are more likely to develop this condition if:  · You have a urinary catheter that stays in place (indwelling).  · You are not able to control when you urinate or have a bowel movement (you have incontinence).  · You are female and you:  ? Use a spermicide or diaphragm for birth control.  ? Have low estrogen levels.  ? Are pregnant.  · You have certain genes that increase your risk (genetics).  · You are sexually active.  · You take antibiotic medicines.  · You have a condition that causes your flow of urine to slow down, such as:  ? An enlarged prostate, if you are male.  ? Blockage in your urethra (stricture).  ? A kidney stone.  ? A nerve condition that affects your bladder control (neurogenic bladder).  ? Not getting enough to drink, or not urinating often.  · You have certain medical conditions, such as:  ? Diabetes.  ? A weak disease-fighting system (immunesystem).  ? Sickle cell disease.  ? Gout.  ? Spinal cord injury.  What are the signs or symptoms?  Symptoms of this condition include:  · Needing to urinate right away (urgently).  · Frequent urination or passing small amounts of urine frequently.  · Pain or burning with urination.  · Blood in the urine.  · Urine that smells bad or unusual.  · Trouble urinating.  · Cloudy  urine.  · Vaginal discharge, if you are female.  · Pain in the abdomen or the lower back.  You may also have:  · Vomiting or a decreased appetite.  · Confusion.  · Irritability or tiredness.  · A fever.  · Diarrhea.  The first symptom in older adults may be confusion. In some cases, they may not have any symptoms until the infection has worsened.  How is this diagnosed?  This condition is diagnosed based on your medical history and a physical exam. You may also have other tests, including:  · Urine tests.  · Blood tests.  · Tests for sexually transmitted infections (STIs).  If you have had more than one UTI, a cystoscopy or imaging studies may be done to determine the cause of the infections.  How is this treated?  Treatment for this condition includes:  · Antibiotic medicine.  · Over-the-counter medicines to treat discomfort.  · Drinking enough water to stay hydrated.  If you have frequent infections or have other conditions such as a kidney stone, you may need to see a health care provider who specializes in the urinary tract (urologist).  In rare cases, urinary tract infections can cause sepsis. Sepsis is a life-threatening condition that occurs when the body responds to an infection. Sepsis is treated in the hospital with IV antibiotics, fluids, and other medicines.  Follow these instructions at home:    Medicines  · Take over-the-counter and prescription medicines only as told by your health care provider.  · If you were prescribed an antibiotic medicine, take it as told by your health care provider. Do not stop using the antibiotic even if you start to feel better.  General instructions  · Make sure you:  ? Empty your bladder often and completely. Do not hold urine for long periods of time.  ? Empty your bladder after sex.  ? Wipe from front to back after a bowel movement if you are female. Use each tissue one time when you wipe.  · Drink enough fluid to keep your urine pale yellow.  · Keep all follow-up  visits as told by your health care provider. This is important.  Contact a health care provider if:  · Your symptoms do not get better after 1-2 days.  · Your symptoms go away and then return.  Get help right away if you have:  · Severe pain in your back or your lower abdomen.  · A fever.  · Nausea or vomiting.  Summary  · A urinary tract infection (UTI) is an infection of any part of the urinary tract, which includes the kidneys, ureters, bladder, and urethra.  · Most urinary tract infections are caused by bacteria in your genital area, around the entrance to your urinary tract (urethra).  · Treatment for this condition often includes antibiotic medicines.  · If you were prescribed an antibiotic medicine, take it as told by your health care provider. Do not stop using the antibiotic even if you start to feel better.  · Keep all follow-up visits as told by your health care provider. This is important.  This information is not intended to replace advice given to you by your health care provider. Make sure you discuss any questions you have with your health care provider.  Document Revised: 12/05/2019 Document Reviewed: 06/27/2019  [employer] Patient Education © 2021 [employer]

## 2021-06-04 NOTE — PROGRESS NOTES
"Chief Complaint  No chief complaint on file.    Subjective          Genna Garcia presents to [facility]    [facility]    PCP: Darrion (retired)--appt to see JANET KeeMARCELLA on 7-    CC: \"burning with urination\"    Last treated for UTI in Feb.  No hx of UTI problems until this year.  Feels like her bladder may have dropped--feels increased vaginal pressure when standing/walking.  Hx of total hysterectomy.     Urinary Tract Infection   This is a new problem. The current episode started yesterday. The problem occurs every urination. The problem has been unchanged. The quality of the pain is described as aching and burning. The pain is mild. There has been no fever. Associated symptoms include frequency. Pertinent negatives include no chills, discharge, flank pain, hematuria, hesitancy, nausea, possible pregnancy, sweats, urgency or vomiting. She has tried increased fluids (takes daily cranberry pills) for the symptoms. The treatment provided no relief. There is no history of recurrent UTIs ( x 2 this year, but no previous problems).       Review of Systems   Constitutional: Negative.  Negative for chills.   HENT: Negative.    Respiratory: Negative.    Cardiovascular: Negative.    Gastrointestinal: Negative.  Negative for nausea and vomiting.   Genitourinary: Positive for dysuria, frequency and pelvic pain (pressure). Negative for flank pain, hematuria, hesitancy, urgency and vaginal discharge.   Musculoskeletal: Positive for back pain (mild).   Skin: Negative.    Neurological: Negative for dizziness and headaches.        Objective   Vital Signs:   /80 (BP Location: Right arm, Patient Position: Sitting, Cuff Size: Adult)   Pulse 92   Temp 97.3 °F (36.3 °C) (Temporal)   Resp 20   Ht 166.4 cm (65.5\")   Wt 77.6 kg (171 lb 2 oz)   SpO2 99%   BMI 28.04 kg/m²       Physical Exam  Vitals and nursing note reviewed.   Constitutional:       General: She " is not in acute distress.     Appearance: Normal appearance. She is not ill-appearing.   HENT:      Head: Normocephalic and atraumatic.   Cardiovascular:      Rate and Rhythm: Normal rate and regular rhythm.   Pulmonary:      Effort: Pulmonary effort is normal. No respiratory distress.      Breath sounds: Normal breath sounds. No wheezing, rhonchi or rales.   Abdominal:      General: Bowel sounds are normal.      Palpations: Abdomen is soft.      Tenderness: There is no abdominal tenderness. There is no right CVA tenderness, left CVA tenderness, guarding or rebound.   Genitourinary:     Comments: Exam declined today    Musculoskeletal:      Cervical back: Neck supple.   Neurological:      General: No focal deficit present.      Mental Status: She is alert and oriented to person, place, and time.   Psychiatric:         Mood and Affect: Mood normal.         Thought Content: Thought content normal.          Result Review :              Recent Results (from the past 24 hour(s))   POCT urinalysis dipstick, automated    Collection Time: 06/04/21  3:00 PM    Specimen: Urine   Result Value Ref Range    Color Yellow Yellow, Straw, Dark Yellow, Celia    Clarity, UA Cloudy (A) Clear    Specific Gravity  1.030 1.005 - 1.030    pH, Urine 6.0 5.0 - 8.0    Leukocytes Moderate (2+) (A) Negative    Nitrite, UA Negative Negative    Protein, POC Negative Negative mg/dL    Glucose, UA Negative Negative, 1000 mg/dL (3+) mg/dL    Ketones, UA Negative Negative    Urobilinogen, UA Normal Normal    Bilirubin Negative Negative    Blood, UA Trace (A) Negative          Assessment and Plan    Diagnoses and all orders for this visit:    1. Acute cystitis with hematuria (Primary)  -     nitrofurantoin, macrocrystal-monohydrate, (Macrobid) 100 MG capsule; Take 1 capsule by mouth 2 (Two) Times a Day.  Dispense: 14 capsule; Refill: 0  -     phenazopyridine (Pyridium) 100 MG tablet; Take 1 tablet by mouth 2 (two) times a day.  Dispense: 6 tablet;  Refill: 0    2. Dysuria  -     POCT urinalysis dipstick, automated  -     Urine Culture - Urine, Urine, Clean Catch  -     Urinalysis, Microscopic Only - Urine, Clean Catch    Other orders  -     fluconazole (Diflucan) 150 MG tablet; 1 tab po x 1 now, may repeat in 4 days prn yeast  Dispense: 2 tablet; Refill: 0      Increase water, limit caffeine  Continue with cranberry pills as needed  Rx for Macrobid, Pyridium provided, Diflucan PRN  Urine culture and micro pending--will notify with results when available.      Declines being checked for bladder prolapse or referral to GYN at this time, will notify us if she changes her mind    See PCP or RTC if symptoms persist/worsen  See PCP for routine f/u visit and management of chronic medical conditions      This document has been electronically signed by MARCELLA Thompson on June 4, 2021 15:13 CDT,.

## 2021-06-05 LAB
BACTERIA SPEC AEROBE CULT: ABNORMAL
BACTERIA UR QL AUTO: ABNORMAL /HPF
HYALINE CASTS UR QL AUTO: ABNORMAL /LPF
RBC # UR: ABNORMAL /HPF
REF LAB TEST METHOD: ABNORMAL
SQUAMOUS #/AREA URNS HPF: ABNORMAL /HPF
WBC UR QL AUTO: ABNORMAL /HPF

## 2021-06-07 NOTE — PROGRESS NOTES
Pt notified of results, pt states she run a fever and had chills this weekend. I let her know that that does happen with uti- pt to  call us if no better.

## 2021-06-08 ENCOUNTER — TELEPHONE (OUTPATIENT)
Dept: FAMILY MEDICINE CLINIC | Facility: CLINIC | Age: 66
End: 2021-06-08

## 2021-06-08 NOTE — TELEPHONE ENCOUNTER
Patient called and states that she had a fever but it broke on Sunday.  Patient states that today she feels achy all over like the flu and has a fever again. She states that she was told that you might need to change the medication.  Patient uses [facility]

## 2021-06-09 RX ORDER — CIPROFLOXACIN 500 MG/1
500 TABLET, FILM COATED ORAL 2 TIMES DAILY
Qty: 14 TABLET | Refills: 0 | Status: SHIPPED | OUTPATIENT
Start: 2021-06-09 | End: 2021-07-01

## 2021-06-15 RX ORDER — PANTOPRAZOLE SODIUM 40 MG/1
40 TABLET, DELAYED RELEASE ORAL DAILY
Qty: 30 TABLET | Refills: 5 | Status: SHIPPED | OUTPATIENT
Start: 2021-06-15 | End: 2021-09-01 | Stop reason: SDUPTHER

## 2021-07-01 ENCOUNTER — OFFICE VISIT (OUTPATIENT)
Dept: PULMONOLOGY | Facility: CLINIC | Age: 66
End: 2021-07-01

## 2021-07-01 VITALS
HEART RATE: 93 BPM | BODY MASS INDEX: 28.12 KG/M2 | HEIGHT: 66 IN | OXYGEN SATURATION: 95 % | SYSTOLIC BLOOD PRESSURE: 128 MMHG | RESPIRATION RATE: 22 BRPM | WEIGHT: 175 LBS | DIASTOLIC BLOOD PRESSURE: 76 MMHG

## 2021-07-01 DIAGNOSIS — M41.25 OTHER IDIOPATHIC SCOLIOSIS, THORACOLUMBAR REGION: Primary | ICD-10-CM

## 2021-07-01 DIAGNOSIS — J30.9 CHRONIC ALLERGIC RHINITIS: ICD-10-CM

## 2021-07-01 DIAGNOSIS — R05.9 COUGH: ICD-10-CM

## 2021-07-01 DIAGNOSIS — R13.19 OTHER DYSPHAGIA: ICD-10-CM

## 2021-07-01 PROCEDURE — 99214 OFFICE O/P EST MOD 30 MIN: CPT | Performed by: INTERNAL MEDICINE

## 2021-07-01 RX ORDER — FEXOFENADINE HCL 180 MG/1
180 TABLET ORAL DAILY
Qty: 30 TABLET | Refills: 5 | Status: SHIPPED | OUTPATIENT
Start: 2021-07-01 | End: 2021-12-16 | Stop reason: SDUPTHER

## 2021-07-01 RX ORDER — FLUTICASONE PROPIONATE 50 MCG
SPRAY, SUSPENSION (ML) NASAL
Qty: 16 ML | Refills: 5 | Status: SHIPPED | OUTPATIENT
Start: 2021-07-01

## 2021-07-01 RX ORDER — OLOPATADINE HYDROCHLORIDE 665 UG/1
2 SPRAY NASAL 2 TIMES DAILY
Qty: 30 G | Refills: 5 | Status: SHIPPED | OUTPATIENT
Start: 2021-07-01

## 2021-07-01 RX ORDER — BUDESONIDE AND FORMOTEROL FUMARATE DIHYDRATE 80; 4.5 UG/1; UG/1
2 AEROSOL RESPIRATORY (INHALATION)
Qty: 1 EACH | Refills: 12 | Status: SHIPPED | OUTPATIENT
Start: 2021-07-01 | End: 2021-07-28 | Stop reason: CLARIF

## 2021-07-01 NOTE — PROGRESS NOTES
Pulmonary Office Follow-up    Subjective     Genna Garcia is seen today at the office for   Chief Complaint   Patient presents with   • Follow-up         HPI  Genna Garcia is a 66 y.o. female with a PMH significant for bronciectasis. Last seen by Dr Evans in Feb  Patient with multiple overlapping issues going on. Essentially she has chronic productive cough from several sources. She has reflux and symptoms of aspiration (coughing with/after eating), and the lower lobe fibrosis on her Ct scan supports this. I can't find a swallow study anywhere. She has esophageal strictures that she has to have stretched periodically   She has allergies, previously on allergy shots but can't afford them now. She has seen ENT before, was going to have sinus surgery but held off for other medical reasons and now her ENT doctor has left. She has run out of her nasal sprays and Allegra.  She is on Breo and that helps for most of the morning but by late afternoon she is having wheezing and coughing again. She uses Duonebs and Combivent as needed    Tobacco use history:  Type: cigarettes  Amount: 2-3 ppd  Duration: 18 years  Cessation: 2002   Willing to quit: N/A      Patient Active Problem List   Diagnosis   • Scoliosis of thoracolumbar spine   • Pleuritic pain   • Other dysphagia   • Acute sinusitis   • Acute upper respiratory infection   • Candidiasis of vagina   • Pain in joint of right shoulder   • Change in bowel habits   • Acute cystitis with hematuria   • Cough   • Chronic allergic rhinitis         Medications, Allergies, Social, and Family Histories reviewed as per EMR.    Objective     Vitals:    07/01/21 1345   BP: 128/76   Pulse: 93   Resp: 22   SpO2: 95%         07/01/21  1345   Weight: 79.4 kg (175 lb)     [unfilled]  Physical Exam  Vitals reviewed.   Constitutional:       Appearance: Normal appearance.   HENT:      Head: Normocephalic and atraumatic.      Nose: Nose normal.       Mouth/Throat:      Mouth: Mucous membranes are moist.      Pharynx: Oropharynx is clear.   Eyes:      Conjunctiva/sclera: Conjunctivae normal.      Pupils: Pupils are equal, round, and reactive to light.   Cardiovascular:      Rate and Rhythm: Normal rate and regular rhythm.      Pulses: Normal pulses.      Heart sounds: Normal heart sounds.   Pulmonary:      Effort: Pulmonary effort is normal.      Breath sounds: Normal breath sounds.   Abdominal:      General: Abdomen is flat. Bowel sounds are normal.      Palpations: Abdomen is soft.   Musculoskeletal:         General: Normal range of motion.      Cervical back: Normal range of motion.   Skin:     General: Skin is warm and dry.   Neurological:      General: No focal deficit present.      Mental Status: She is alert and oriented to person, place, and time.   Psychiatric:         Mood and Affect: Mood normal.         Behavior: Behavior normal.               PFTs: 10/21/20 (independently reviewed and interpreted by me)  Ratio 78  FVC 2.22/ 69%  FEV1 1.73/ 69%  TLC 2.77/ 53%  DLCO 15.66/ 61%  Variable effort.  Mild restriction. Mildly reduced diffusing capacity. No comparative data available.     Radiology (independently reviewed and interpreted by me): CT chest without contrast 5/20/2020 showed small focus of nodularity posterior right upper lobe, lower lobe predominant interstitial opacity with air bronchograms and traction bronchiectasis, numerous calcified granulomas bilaterally, mildly enlarged mediastinal lymph nodes largest precarinal measuring 1.1 x 1.55 cm, small axillary and retropectoral lymph nodes largest measuring 8.5 mm, findings similar to 4/2/2016. Notable thoracic/lumbar scoliosis       Assessment/Plan     Diagnoses and all orders for this visit:    1. Other idiopathic scoliosis, thoracolumbar region (Primary)    2. Chronic allergic rhinitis  -     fexofenadine (Allegra Allergy) 180 MG tablet; Take 1 tablet by mouth Daily.  Dispense: 30 tablet;  Refill: 5    3. Other dysphagia    4. Cough    Other orders  -     budesonide-formoterol (Symbicort) 80-4.5 MCG/ACT inhaler; Inhale 2 puffs 2 (Two) Times a Day.  Dispense: 1 each; Refill: 12  -     fluticasone (FLONASE) 50 MCG/ACT nasal spray; 2 sprays into each nostril daily  Dispense: 16 mL; Refill: 5  -     olopatadine (PATANASE) 0.6 % solution nasal solution; 2 sprays by Each Nare route 2 (Two) Times a Day.  Dispense: 30 g; Refill: 5         Discussion/ Recommendations:   Patient with chronic cough, multifactorial. First, we will try switching her Breo to Symbicort as her symptoms are not being controlled for 24 hours.   Will renew her nasal sprays and allergy meds and see if that helps with her current sinus drainage. She probably needs to reengage with ENT to follow up on sinus surgeyr. I reassured her that her lung function is actually pretty good on her last PFTs and she should tolerate surgery fine  I don't like that shes having coughing with eating, that makes me worry about aspiration. I think she needs a swallow eval, I can't find that one has been done here before. Will find out who performs those here (speech path, ENT, etc?)    Will follow up with patient in a few months and see how she's doing with medication changes          Return in about 2 months (around 9/1/2021).          This document has been electronically signed by Mariah De Jesus DO on July 1, 2021 14:21 CDT

## 2021-07-12 ENCOUNTER — TELEPHONE (OUTPATIENT)
Dept: GASTROENTEROLOGY | Facility: CLINIC | Age: 66
End: 2021-07-12

## 2021-07-12 NOTE — TELEPHONE ENCOUNTER
Patient called.  Needing refill for her Dicyclomine 20mg,  [facility] in [place].    Please call patient: [phone].

## 2021-07-13 ENCOUNTER — TELEPHONE (OUTPATIENT)
Dept: GASTROENTEROLOGY | Facility: CLINIC | Age: 66
End: 2021-07-13

## 2021-07-13 RX ORDER — DICYCLOMINE HCL 20 MG
TABLET ORAL
Qty: 90 TABLET | Refills: 3 | OUTPATIENT
Start: 2021-07-13

## 2021-07-13 NOTE — TELEPHONE ENCOUNTER
07/13/2021, 1347 - Patient telephoned per this staff member [phone].  Zero answer.  Voice message submitted on patient's self identified answering machine with date, time, office contact information, and notification patient is in need of contacting office and scheduling/attending clinical appointment in order to continue to receive further prescription medication renewals for Dicyclomine 20 MG Tablets.    Note - Patient prior clinical appointment 11/10/2020.  Patient to return as needed.    07/13/2021, 1353 - [facility] telephoned per this staff member [phone].  Spoke with PharmacistJay.  Verbal order submitted regarding prescription medication renewal as originally prescribed 05/24/2021 per Dr. Evan Chavez M.D. - Dicyclomine 20 MG Tablets, 1 tablet by mouth 3 times per day and at bedtime as needed for cramping, #90, however, 0 renewals authorized.      Note - Pharmacist stated patient has been utilizing prescription medication routinely verses as needed.    07/13/2021, 1403 - Patient telephoned per this staff member [phone].  Zero answer.  Voice message submitted on patient's self identified answering machine with date, time, office contact information, and notification a one time courtesy prescription medication renewal has been submitted to [facility].  Patient made aware Dr. Evan Chavez M.D. will return to office Friday, July 16, 2021 at which time this staff member will seek advisement regarding additional prescription medication renewals.  Patient instructed to contact office and schedule clinical appointment.

## 2021-07-13 NOTE — TELEPHONE ENCOUNTER
07/13/2021, 1347 - Patient telephoned per this staff member [phone].  Zero answer.  Voice message submitted on patient's self identified answering machine with date, time, office contact information, and notification patient is in need of contacting office and scheduling/attending clinical appointment in order to continue to receive further prescription medication renewals for Dicyclomine 20 MG Tablets.    Note - Patient prior clinical appointment 11/10/2020.  Patient to return as needed.    07/13/2021, 1353 - [facility] telephoned per this staff member [phone].  Spoke with PharmacistJay.  Verbal order submitted regarding prescription medication renewal as originally prescribed 05/24/2021 per Dr. Evan Chavez M.D. - Dicyclomine 20 MG Tablets, 1 tablet by mouth 3 times per day and at bedtime as needed for cramping, #90, however, 0 renewals authorized.      Note - Pharmacist stated patient has been utilizing prescription medication routinely verses as needed.    07/13/2021, 1403 - Patient telephoned per this staff member [phone].  Zero answer.  Voice message submitted on patient's self identified answering machine with date, time, office contact information, and notification a one time courtesy prescription medication renewal has been submitted to [facility].  Patient made aware Dr. Evan Chavez M.D. will return to office Friday, July 16, 2021 at which time this staff member will seek advisement regarding additional prescription medication renewals.  Patient instructed to contact office and schedule clinical appointment.

## 2021-07-13 NOTE — TELEPHONE ENCOUNTER
07/13/2021, 1347 - Patient telephoned per this staff member [phone].  Zero answer.  Voice message submitted on patient's self identified answering machine with date, time, office contact information, and notification patient is in need of contacting office and scheduling/attending clinical appointment in order to continue to receive further prescription medication renewals for Dicyclomine 20 MG Tablets.     Note - Patient prior clinical appointment 11/10/2020.  Patient to return as needed.     07/13/2021, 1353 - [facility] telephoned per this staff member [phone].  Spoke with PharmacistJay.  Verbal order submitted regarding prescription medication renewal as originally prescribed 05/24/2021 per Dr. Evan Chavez M.D. - Dicyclomine 20 MG Tablets, 1 tablet by mouth 3 times per day and at bedtime as needed for cramping, #90, however, 0 renewals authorized.       Note - Pharmacist stated patient has been utilizing prescription medication routinely verses as needed.     07/13/2021, 1403 - Patient telephoned per this staff member [phone].  Zero answer.  Voice message submitted on patient's self identified answering machine with date, time, office contact information, and notification a one time courtesy prescription medication renewal has been submitted to [facility].  Patient made aware Dr. Evan Chavez M.D. will return to office Friday, July 16, 2021 at which time this staff member will seek advisement regarding additional prescription medication renewals.  Patient instructed to contact office and schedule clinical appointment.

## 2021-07-21 ENCOUNTER — TELEPHONE (OUTPATIENT)
Dept: GASTROENTEROLOGY | Facility: CLINIC | Age: 66
End: 2021-07-21

## 2021-07-21 NOTE — TELEPHONE ENCOUNTER
07/21/2021, 1507 - Patient telephoned per this staff member [phone] with notification of need to schedule clinical appointment with Dr. Evan Chavez M.D. as prior clinical appointment 11/10/2020 and in order to continue to receive prescription medication Dicyclomine 20 MG tablets, patient is in need of attending clinical appointment.  Patient tin agreement.  10 month clinical appointment scheduled Friday, September 3, 2021 at 4:00 P.M.    Note - Patient made aware a 1 time courtesy prescription medication renewal submitted to [facility] - Dicyclomine 20 MG tablets, 1 tablet by mouth 3 times per day and at bedtime as needed for cramping, #90, 0 renewals.  Patient did confirm she is utilizing prescription medication on an as needed basis not routinely.

## 2021-07-23 ENCOUNTER — TELEPHONE (OUTPATIENT)
Dept: PULMONOLOGY | Facility: CLINIC | Age: 66
End: 2021-07-23

## 2021-07-23 NOTE — TELEPHONE ENCOUNTER
Pt called and said insurance has denied her script for Symbicort and she is out of her Breo inhaler.     She said the pharmacy was going to be sending over a request to refill the Breo.I explained that Dr. De Jesus was not available to address that until Monday morning when she returns to the office.     Thank you.

## 2021-08-18 ENCOUNTER — OFFICE VISIT (OUTPATIENT)
Dept: FAMILY MEDICINE CLINIC | Facility: CLINIC | Age: 66
End: 2021-08-18

## 2021-08-18 VITALS
DIASTOLIC BLOOD PRESSURE: 66 MMHG | BODY MASS INDEX: 27.38 KG/M2 | SYSTOLIC BLOOD PRESSURE: 128 MMHG | TEMPERATURE: 98.1 F | HEART RATE: 100 BPM | RESPIRATION RATE: 22 BRPM | WEIGHT: 170.4 LBS | HEIGHT: 66 IN | OXYGEN SATURATION: 96 %

## 2021-08-18 DIAGNOSIS — J44.9 CHRONIC OBSTRUCTIVE PULMONARY DISEASE, UNSPECIFIED COPD TYPE (HCC): ICD-10-CM

## 2021-08-18 DIAGNOSIS — R13.19 OTHER DYSPHAGIA: ICD-10-CM

## 2021-08-18 DIAGNOSIS — Z00.00 ENCOUNTER FOR MEDICAL EXAMINATION TO ESTABLISH CARE: Primary | ICD-10-CM

## 2021-08-18 DIAGNOSIS — T78.40XA SEVERE ALLERGIC REACTION WITH RESPIRATORY DISTRESS: ICD-10-CM

## 2021-08-18 DIAGNOSIS — R06.03 SEVERE ALLERGIC REACTION WITH RESPIRATORY DISTRESS: ICD-10-CM

## 2021-08-18 DIAGNOSIS — J30.9 CHRONIC ALLERGIC RHINITIS: ICD-10-CM

## 2021-08-18 PROBLEM — R07.81 PLEURITIC PAIN: Status: RESOLVED | Noted: 2019-06-26 | Resolved: 2021-08-18

## 2021-08-18 PROBLEM — J06.9 ACUTE UPPER RESPIRATORY INFECTION: Status: RESOLVED | Noted: 2020-05-19 | Resolved: 2021-08-18

## 2021-08-18 PROBLEM — B37.31 CANDIDIASIS OF VAGINA: Status: RESOLVED | Noted: 2020-05-19 | Resolved: 2021-08-18

## 2021-08-18 PROBLEM — R05.9 COUGH: Status: RESOLVED | Noted: 2021-07-01 | Resolved: 2021-08-18

## 2021-08-18 PROBLEM — J01.90 ACUTE SINUSITIS: Status: RESOLVED | Noted: 2020-05-19 | Resolved: 2021-08-18

## 2021-08-18 PROBLEM — N30.01 ACUTE CYSTITIS WITH HEMATURIA: Status: RESOLVED | Noted: 2021-02-05 | Resolved: 2021-08-18

## 2021-08-18 PROCEDURE — 99396 PREV VISIT EST AGE 40-64: CPT | Performed by: NURSE PRACTITIONER

## 2021-08-18 RX ORDER — EPINEPHRINE 0.3 MG/.3ML
0.3 INJECTION SUBCUTANEOUS TAKE AS DIRECTED
Qty: 2 EACH | Refills: 1 | Status: SHIPPED | OUTPATIENT
Start: 2021-08-18 | End: 2022-05-26 | Stop reason: RX

## 2021-09-01 ENCOUNTER — OFFICE VISIT (OUTPATIENT)
Dept: GASTROENTEROLOGY | Facility: CLINIC | Age: 66
End: 2021-09-01

## 2021-09-01 VITALS
BODY MASS INDEX: 27.19 KG/M2 | HEART RATE: 93 BPM | HEIGHT: 66 IN | WEIGHT: 169.2 LBS | OXYGEN SATURATION: 97 % | DIASTOLIC BLOOD PRESSURE: 64 MMHG | SYSTOLIC BLOOD PRESSURE: 122 MMHG

## 2021-09-01 DIAGNOSIS — R10.13 EPIGASTRIC PAIN: ICD-10-CM

## 2021-09-01 DIAGNOSIS — R19.7 DIARRHEA OF PRESUMED INFECTIOUS ORIGIN: ICD-10-CM

## 2021-09-01 DIAGNOSIS — K59.04 CHRONIC IDIOPATHIC CONSTIPATION: ICD-10-CM

## 2021-09-01 DIAGNOSIS — R13.19 OTHER DYSPHAGIA: Primary | ICD-10-CM

## 2021-09-01 PROCEDURE — 99214 OFFICE O/P EST MOD 30 MIN: CPT | Performed by: INTERNAL MEDICINE

## 2021-09-01 RX ORDER — DICYCLOMINE HCL 20 MG
20 TABLET ORAL EVERY 6 HOURS
Qty: 120 TABLET | Refills: 5 | Status: SHIPPED | OUTPATIENT
Start: 2021-09-01 | End: 2022-11-14

## 2021-09-01 RX ORDER — PANTOPRAZOLE SODIUM 40 MG/1
40 TABLET, DELAYED RELEASE ORAL DAILY
Qty: 30 TABLET | Refills: 5 | Status: SHIPPED | OUTPATIENT
Start: 2021-09-01 | End: 2022-06-28

## 2021-09-01 RX ORDER — FAMOTIDINE 20 MG/1
20 TABLET, FILM COATED ORAL DAILY
Qty: 30 TABLET | Refills: 11 | Status: SHIPPED | OUTPATIENT
Start: 2021-09-01 | End: 2022-04-19

## 2021-09-01 NOTE — PROGRESS NOTES
"[facility]      Chief Complaint:   No chief complaint on file.      Subjective     HPI:   Patient with history of dysphagia.  Patient states his vision markedly improved patient is currently on a proton pump inhibitor and H2 blocker.  Patient does take Carafate as needed patient states the dysphagia has not worsened but she is feeling some sensation at this time.  Patient also with irritable bowel with diarrhea currently having some flareup.  Discussed patient increasing the Bentyl to up to 4 times a day as needed.    Plan; we will schedule patient follow-up in 6 months we will have patient increase Carafate as needed.  Patient also increased Bentyl as needed for diarrhea up to 4 times per day.  Patient to return earlier if dysphagia worsens    Past Medical History:   Past Medical History:   Diagnosis Date   • Anesthesia complication     states she \"stopped breathing during her last procedure\"   • Arthritis    • Elevated cholesterol    • Endometriosis    • Falls    • GERD (gastroesophageal reflux disease)    • Osteoporosis    • Scoliosis of thoracolumbar spine    • Seasonal rhinitis    • Sinusitis        Past Surgical History:  Past Surgical History:   Procedure Laterality Date   • APPENDECTOMY     • COLONOSCOPY     • COLONOSCOPY N/A 6/30/2020    Procedure: COLONOSCOPY;  Surgeon: Evan Rondon MD;  Location: [facility] ENDOSCOPY;  Service: Gastroenterology;  Laterality: N/A;   • ENDOSCOPY N/A 10/16/2019    Procedure: ESOPHAGOGASTRODUODENOSCOPY;  Surgeon: Eavn Rondon MD;  Location: [facility] ENDOSCOPY;  Service: Gastroenterology   • ENDOSCOPY N/A 6/30/2020    Procedure: ESOPHAGOGASTRODUODENOSCOPY;  Surgeon: Evan Rondon MD;  Location: [facility] ENDOSCOPY;  Service: Gastroenterology;  Laterality: N/A;  savory dilation 48-54   • FOREARM SURGERY     • HYSTERECTOMY     • SALPINGO OOPHORECTOMY     • UPPER GASTROINTESTINAL ENDOSCOPY  10/16/2019   • UPPER GASTROINTESTINAL ENDOSCOPY  06/30/2020   • " WRIST FRACTURE SURGERY Right        Family History:  Family History   Problem Relation Age of Onset   • Heart failure Mother    • Thyroid disease Mother    • Ulcerative colitis Father        Social History:   reports that she quit smoking about 19 years ago. Her smoking use included cigarettes. She quit after 20.00 years of use. She has never used smokeless tobacco. She reports that she does not drink alcohol and does not use drugs.    Medications:   Prior to Admission medications    Medication Sig Start Date End Date Taking? Authorizing Provider   albuterol (PROVENTIL) (2.5 MG/3ML) 0.083% nebulizer solution Take 2.5 mg by nebulization Every 4 (Four) Hours As Needed for Wheezing. 1/14/20  Yes Noah Briggs APRN   albuterol sulfate  (90 Base) MCG/ACT inhaler Inhale 2 puffs Every 4 (Four) Hours As Needed for Wheezing.   Yes Roosevelt Bryant MD   Biotin 1 MG capsule Take 1 capsule by mouth Daily.   Yes Roosevelt Bryant MD   Budeson-Glycopyrrol-Formoterol (BREZTRI) 160-9-4.8 MCG/ACT aerosol inhaler Inhale 2 puffs 2 (Two) Times a Day. 7/28/21  Yes Mariah De Jesus,    Calcium-Magnesium-Vitamin D - MG-MG-UNIT tablet sustained-release 24 hour Take 2 tablets by mouth Daily.   Yes Roosevelt Bryant MD   Cranberry 500 MG tablet Take 500 mg by mouth 2 (two) times a day.   Yes Roosevelt Bryant MD   dicyclomine (BENTYL) 20 MG tablet Take 1 tablet by mouth Every 6 (Six) Hours. 9/1/21  Yes Evan Rondon MD   famotidine (PEPCID) 20 MG tablet Take 1 tablet by mouth Daily. 9/1/21  Yes Evan Rondon MD   fexofenadine (Allegra Allergy) 180 MG tablet Take 1 tablet by mouth Daily. 7/1/21  Yes aMriah De Jesus DO   fluticasone (FLONASE) 50 MCG/ACT nasal spray 2 sprays into each nostril daily 7/1/21  Yes Mariah De Jesus DO   olopatadine (PATANASE) 0.6 % solution nasal solution 2 sprays by Each Nare route 2 (Two) Times a Day. 7/1/21  Yes Mariah De Jesus DO   pantoprazole  (PROTONIX) 40 MG EC tablet Take 1 tablet by mouth Daily. 9/1/21  Yes Evan Rondon MD   Potassium Iodide, Expectorant, (SSKI) 1 GM/ML solution Take 0.6 mL by mouth 3 (Three) Times a Day. 12/2/20  Yes Jorgito Evans MD   dicyclomine (BENTYL) 20 MG tablet  11/9/20 9/1/21 Yes Roosevelt Bryant MD   famotidine (PEPCID) 20 MG tablet TAKE 1 TABLET BY MOUTH DAILY. 3/29/21 9/1/21 Yes Evan Rondon MD   pantoprazole (PROTONIX) 40 MG EC tablet Take 1 tablet by mouth Daily. 6/15/21 9/1/21 Yes Evan Rondon MD   EPINEPHrine (EPIPEN) 0.3 MG/0.3ML solution auto-injector injection Inject 0.3 mL into the appropriate muscle as directed by prescriber Take As Directed. 8/18/21   Neena Kee APRN   ipratropium (ATROVENT) 0.02 % nebulizer solution Take 2.5 mL by nebulization 4 (Four) Times a Day As Needed for Wheezing or Shortness of Air. 12/2/20   Jorgito Evans MD   ipratropium-albuterol (COMBIVENT RESPIMAT)  MCG/ACT inhaler Inhale 1 puff 4 (Four) Times a Day As Needed for Shortness of Air. 1/14/20   Noah Briggs APRN       Allergies:  Sulfa antibiotics, Levaquin [levofloxacin], Augmentin [amoxicillin-pot clavulanate], and Cefuroxime    ROS:    Review of Systems   Constitutional: Negative for activity change, appetite change, chills, diaphoresis, fatigue, fever and unexpected weight change.   HENT: Negative for sore throat and trouble swallowing.    Respiratory: Negative for shortness of breath.    Gastrointestinal: Negative for abdominal distention, abdominal pain, anal bleeding, blood in stool, constipation, diarrhea, nausea, rectal pain and vomiting.   Endocrine: Negative for polydipsia, polyphagia and polyuria.   Genitourinary: Negative for difficulty urinating.   Musculoskeletal: Negative for arthralgias.   Skin: Negative for pallor.   Allergic/Immunologic: Negative for food allergies.   Neurological: Negative for weakness and light-headedness.   Psychiatric/Behavioral: Negative for  "behavioral problems.     Objective     Blood pressure 122/64, pulse 93, height 166.4 cm (65.5\"), weight 76.7 kg (169 lb 3.2 oz), SpO2 97 %.    Physical Exam  Constitutional:       General: She is not in acute distress.     Appearance: She is well-developed. She is not diaphoretic.   HENT:      Head: Normocephalic and atraumatic.   Cardiovascular:      Rate and Rhythm: Normal rate and regular rhythm.      Heart sounds: Normal heart sounds. No murmur heard.   No friction rub. No gallop.    Pulmonary:      Effort: No respiratory distress.      Breath sounds: Normal breath sounds. No wheezing or rales.   Chest:      Chest wall: No tenderness.   Abdominal:      General: Bowel sounds are normal. There is no distension.      Palpations: Abdomen is soft. There is no mass.      Tenderness: There is no abdominal tenderness. There is no guarding or rebound.      Hernia: No hernia is present.   Musculoskeletal:         General: Normal range of motion.   Skin:     General: Skin is warm and dry.      Coloration: Skin is not pale.      Findings: No erythema or rash.   Neurological:      Mental Status: She is alert and oriented to person, place, and time.   Psychiatric:         Behavior: Behavior normal.         Thought Content: Thought content normal.         Judgment: Judgment normal.          Assessment/Plan   Diagnoses and all orders for this visit:    1. Other dysphagia (Primary)    2. Epigastric pain    3. Chronic idiopathic constipation    4. Diarrhea of presumed infectious origin    Other orders  -     pantoprazole (PROTONIX) 40 MG EC tablet; Take 1 tablet by mouth Daily.  Dispense: 30 tablet; Refill: 5  -     famotidine (PEPCID) 20 MG tablet; Take 1 tablet by mouth Daily.  Dispense: 30 tablet; Refill: 11  -     dicyclomine (BENTYL) 20 MG tablet; Take 1 tablet by mouth Every 6 (Six) Hours.  Dispense: 120 tablet; Refill: 5        * Surgery not found *     Diagnosis Plan   1. Other dysphagia     2. Epigastric pain     3. " Chronic idiopathic constipation     4. Diarrhea of presumed infectious origin         Anticipated Surgical Procedure:  No orders of the defined types were placed in this encounter.      The risks, benefits, and alternatives of this procedure have been discussed with the patient or the responsible party- the patient understands and agrees to proceed.

## 2021-09-05 PROBLEM — R06.03 SEVERE ALLERGIC REACTION WITH RESPIRATORY DISTRESS: Status: ACTIVE | Noted: 2021-09-05

## 2021-09-05 PROBLEM — T78.40XA SEVERE ALLERGIC REACTION WITH RESPIRATORY DISTRESS: Status: ACTIVE | Noted: 2021-09-05

## 2021-09-05 PROBLEM — J44.9 CHRONIC OBSTRUCTIVE PULMONARY DISEASE: Status: ACTIVE | Noted: 2021-09-05

## 2021-09-15 ENCOUNTER — TELEPHONE (OUTPATIENT)
Dept: CARDIOLOGY | Facility: CLINIC | Age: 66
End: 2021-09-15

## 2021-09-15 NOTE — TELEPHONE ENCOUNTER
I spoke to patient and advised that Dr. De Jesus is out of the office until 9/20/2021.  I told her that I was unable to change meds for her and she should contact her PCP and ask if they would change her medicine.   She was agreeable and stated will contact her PCP.        ----- Message from Zuleyka Aceves sent at 9/15/2021  2:54 PM CDT -----  Contact: [phone]  She said the Symbecort is causing really bad headaches and wants to go back to [place]ebbles. Please call

## 2021-09-21 ENCOUNTER — TELEPHONE (OUTPATIENT)
Dept: PULMONOLOGY | Facility: CLINIC | Age: 66
End: 2021-09-21

## 2021-09-21 NOTE — TELEPHONE ENCOUNTER
Dr. De Jesus approved the change back to Breo 200-25.  This rx has been sent to [facility] pharmacy.  I spoke to Ms. Garcia and she stated that she mis spoke on the Symbicort, it was Breztri that she was on that caused her headaches. .  She does not use it because insurance would not pay for it. The rx for Breztri has been D/C and message sent to pharmacy to D/C it.        ----- Message from Zuleyka Aceves sent at 9/21/2021  1:36 PM CDT -----  Contact: [phone]  Vik De Jesus took her off Breo and tried Symbicort and it gives her daily headaches. Will she put her back on Breo and call it in to [facility] in [place]. She can't breather

## 2021-09-24 ENCOUNTER — OFFICE VISIT (OUTPATIENT)
Dept: PULMONOLOGY | Facility: CLINIC | Age: 66
End: 2021-09-24

## 2021-09-24 VITALS
HEART RATE: 77 BPM | SYSTOLIC BLOOD PRESSURE: 104 MMHG | BODY MASS INDEX: 27 KG/M2 | WEIGHT: 168 LBS | OXYGEN SATURATION: 98 % | HEIGHT: 66 IN | DIASTOLIC BLOOD PRESSURE: 78 MMHG | TEMPERATURE: 96.8 F

## 2021-09-24 DIAGNOSIS — R05.3 CHRONIC COUGH: Primary | ICD-10-CM

## 2021-09-24 DIAGNOSIS — J45.40 MODERATE PERSISTENT ASTHMA WITHOUT COMPLICATION: ICD-10-CM

## 2021-09-24 DIAGNOSIS — J30.9 CHRONIC ALLERGIC RHINITIS: ICD-10-CM

## 2021-09-24 DIAGNOSIS — R13.19 OTHER DYSPHAGIA: ICD-10-CM

## 2021-09-24 PROBLEM — J44.9 CHRONIC OBSTRUCTIVE PULMONARY DISEASE: Status: RESOLVED | Noted: 2021-09-05 | Resolved: 2021-09-24

## 2021-09-24 PROCEDURE — 99214 OFFICE O/P EST MOD 30 MIN: CPT | Performed by: INTERNAL MEDICINE

## 2021-09-24 RX ORDER — MELOXICAM 15 MG/1
15 TABLET ORAL DAILY
Qty: 10 TABLET | Refills: 0 | Status: SHIPPED | OUTPATIENT
Start: 2021-09-24 | End: 2021-11-16

## 2021-09-24 RX ORDER — IPRATROPIUM BROMIDE AND ALBUTEROL SULFATE 2.5; .5 MG/3ML; MG/3ML
3 SOLUTION RESPIRATORY (INHALATION) EVERY 4 HOURS PRN
Qty: 360 ML | Refills: 5 | Status: SHIPPED | OUTPATIENT
Start: 2021-09-24 | End: 2022-12-15 | Stop reason: SDUPTHER

## 2021-09-24 NOTE — PROGRESS NOTES
Pulmonary Office Follow-up    Subjective     Genna Garcia is seen today at the office for   Chief Complaint   Patient presents with   • Bronchiectasis without acute exacerbation         HPI  Genna Garcia is a 66 y.o. female with a PMH significant for bronciectasis.     9/24/21  Patient here for follow up. At last ivist I ordere dher Symbicort but her insurance wouldn't cover it. She apparently got generic low dose Symbicort though, and this gave her severe headaches. Went back to Breo but taking it later in the day and that worked better  She continues to have coughing when she eats. She didn't discuss this with her GI doc at last appointment but she is going to see him again    Last OV 7/1/21  Last seen by Dr Evans in Feb  Patient with multiple overlapping issues going on. Essentially she has chronic productive cough from several sources. She has reflux and symptoms of aspiration (coughing with/after eating), and the lower lobe fibrosis on her Ct scan supports this. I can't find a swallow study anywhere. She has esophageal strictures that she has to have stretched periodically   She has allergies, previously on allergy shots but can't afford them now. She has seen ENT before, was going to have sinus surgery but held off for other medical reasons and now her ENT doctor has left. She has run out of her nasal sprays and Allegra.  She is on Breo and that helps for most of the morning but by late afternoon she is having wheezing and coughing again. She uses Duonebs and Combivent as needed    Tobacco use history:  Type: cigarettes  Amount: 2-3 ppd  Duration: 18 years  Cessation: 2002   Willing to quit: N/A      Patient Active Problem List   Diagnosis   • Scoliosis of thoracolumbar spine   • Other dysphagia   • Pain in joint of right shoulder   • Change in bowel habits   • Chronic allergic rhinitis   • Severe allergic reaction with respiratory distress         Medications, Allergies, Social,  and Family Histories reviewed as per EMR.    Objective     Vitals:    09/24/21 1328   BP: 104/78   Pulse: 77   Temp: 96.8 °F (36 °C)   SpO2: 98%         09/24/21  1328   Weight: 76.2 kg (168 lb)     [unfilled]  Physical Exam  Vitals reviewed.   Constitutional:       Appearance: Normal appearance.   HENT:      Head: Normocephalic and atraumatic.      Nose: Nose normal.      Mouth/Throat:      Mouth: Mucous membranes are moist.      Pharynx: Oropharynx is clear.   Eyes:      Conjunctiva/sclera: Conjunctivae normal.      Pupils: Pupils are equal, round, and reactive to light.   Cardiovascular:      Rate and Rhythm: Normal rate and regular rhythm.      Pulses: Normal pulses.      Heart sounds: Normal heart sounds.   Pulmonary:      Effort: Pulmonary effort is normal.      Breath sounds: Normal breath sounds.   Abdominal:      General: Abdomen is flat. Bowel sounds are normal.      Palpations: Abdomen is soft.   Musculoskeletal:         General: Normal range of motion.      Cervical back: Normal range of motion.   Skin:     General: Skin is warm and dry.   Neurological:      General: No focal deficit present.      Mental Status: She is alert and oriented to person, place, and time.   Psychiatric:         Mood and Affect: Mood normal.         Behavior: Behavior normal.               PFTs: 10/21/20 (independently reviewed and interpreted by me)  Ratio 78  FVC 2.22/ 69%  FEV1 1.73/ 69%  TLC 2.77/ 53%  DLCO 15.66/ 61%  Variable effort.  Mild restriction. Mildly reduced diffusing capacity. No comparative data available.     Radiology (independently reviewed and interpreted by me): CT chest without contrast 5/20/2020 showed small focus of nodularity posterior right upper lobe, lower lobe predominant interstitial opacity with air bronchograms and traction bronchiectasis, numerous calcified granulomas bilaterally, mildly enlarged mediastinal lymph nodes largest precarinal measuring 1.1 x 1.55 cm, small axillary and  retropectoral lymph nodes largest measuring 8.5 mm, findings similar to 4/2/2016. Notable thoracic/lumbar scoliosis       Assessment/Plan     Diagnoses and all orders for this visit:    1. Chronic cough (Primary)    2. Chronic allergic rhinitis    3. Other dysphagia    4. Moderate persistent asthma without complication    Other orders  -     meloxicam (Mobic) 15 MG tablet; Take 1 tablet by mouth Daily.  Dispense: 10 tablet; Refill: 0  -     ipratropium-albuterol (DUO-NEB) 0.5-2.5 mg/3 ml nebulizer; Take 3 mL by nebulization Every 4 (Four) Hours As Needed for Wheezing or Shortness of Air.  Dispense: 360 mL; Refill: 5         Discussion/ Recommendations:   Patient with chronic cough, multifactorial.   She is doing better with the Breo again, just changed the timing of it which is fine  I do think she's probably having either some aspiration or reflux causing a lot of her cough. She's going to see her GI doc about this. Discussed a referral to ENT but she would like ot wait until after Jan for financial reasons which is fine. I will see her then as well          Return in about 3 months (around 12/24/2021).          This document has been electronically signed by Mariah De Jesus DO on September 24, 2021 14:18 CDT

## 2021-11-11 ENCOUNTER — OFFICE VISIT (OUTPATIENT)
Dept: GASTROENTEROLOGY | Facility: CLINIC | Age: 66
End: 2021-11-11

## 2021-11-11 VITALS
SYSTOLIC BLOOD PRESSURE: 143 MMHG | DIASTOLIC BLOOD PRESSURE: 87 MMHG | HEIGHT: 66 IN | WEIGHT: 167.4 LBS | BODY MASS INDEX: 26.9 KG/M2 | HEART RATE: 79 BPM

## 2021-11-11 DIAGNOSIS — R13.19 OTHER DYSPHAGIA: Primary | ICD-10-CM

## 2021-11-11 PROCEDURE — 99214 OFFICE O/P EST MOD 30 MIN: CPT | Performed by: INTERNAL MEDICINE

## 2021-11-11 RX ORDER — DEXTROSE AND SODIUM CHLORIDE 5; .45 G/100ML; G/100ML
30 INJECTION, SOLUTION INTRAVENOUS CONTINUOUS PRN
Status: CANCELLED | OUTPATIENT
Start: 2021-11-17

## 2021-11-11 NOTE — PATIENT INSTRUCTIONS
"BMI for Adults  What is BMI?  Body mass index (BMI) is a number that is calculated from a person's weight and height. BMI can help estimate how much of a person's weight is composed of fat. BMI does not measure body fat directly. Rather, it is an alternative to procedures that directly measure body fat, which can be difficult and expensive.  BMI can help identify people who may be at higher risk for certain medical problems.  What are BMI measurements used for?  BMI is used as a screening tool to identify possible weight problems. It helps determine whether a person is obese, overweight, a healthy weight, or underweight.  BMI is useful for:  · Identifying a weight problem that may be related to a medical condition or may increase the risk for medical problems.  · Promoting changes, such as changes in diet and exercise, to help reach a healthy weight. BMI screening can be repeated to see if these changes are working.  How is BMI calculated?  BMI involves measuring your weight in relation to your height. Both height and weight are measured, and the BMI is calculated from those numbers. This can be done either in English (U[place] or metric measurements. Note that charts and online BMI calculators are available to help you find your BMI quickly and easily without having to do these calculations yourself.  To calculate your BMI in English ([place].S[place] measurements:    1. Measure your weight in pounds (lb).  2. Multiply the number of pounds by 703.  ? For example, for a person who weighs 180 lb, multiply that number by 703, which equals 126,540.  3. Measure your height in inches. Then multiply that number by itself to get a measurement called \"inches squared.\"  ? For example, for a person who is 70 inches tall, the \"inches squared\" measurement is 70 inches x 70 inches, which equals 4,900 inches squared.  4. Divide the total from step 2 (number of lb x 703) by the total from step 3 (inches squared): 126,540 ÷ 4,900 = 25.8. This is " "your BMI.    To calculate your BMI in metric measurements:  1. Measure your weight in kilograms (kg).  2. Measure your height in meters (m). Then multiply that number by itself to get a measurement called \"meters squared.\"  ? For example, for a person who is 1.75 m tall, the \"meters squared\" measurement is 1.75 m x 1.75 m, which is equal to 3.1 meters squared.  3. Divide the number of kilograms (your weight) by the meters squared number. In this example: 70 ÷ 3.1 = 22.6. This is your BMI.  What do the results mean?  BMI charts are used to identify whether you are underweight, normal weight, overweight, or obese. The following guidelines will be used:  · Underweight: BMI less than 18.5.  · Normal weight: BMI between 18.5 and 24.9.  · Overweight: BMI between 25 and 29.9.  · Obese: BMI of 30 or above.  Keep these notes in mind:  · Weight includes both fat and muscle, so someone with a muscular build, such as an athlete, may have a BMI that is higher than 24.9. In cases like these, BMI is not an accurate measure of body fat.  · To determine if excess body fat is the cause of a BMI of 25 or higher, further assessments may need to be done by a health care provider.  · BMI is usually interpreted in the same way for men and women.  Where to find more information  For more information about BMI, including tools to quickly calculate your BMI, go to these websites:  · Centers for Disease Control and Prevention: www.cdc.gov  · American Heart Association: www.heart.org  · National Heart, Lung, and Blood [place]: www.nhlbi.nih.gov  Summary  · Body mass index (BMI) is a number that is calculated from a person's weight and height.  · BMI may help estimate how much of a person's weight is composed of fat. BMI can help identify those who may be at higher risk for certain medical problems.  · BMI can be measured using English measurements or metric measurements.  · BMI charts are used to identify whether you are underweight, normal " weight, overweight, or obese.  This information is not intended to replace advice given to you by your health care provider. Make sure you discuss any questions you have with your health care provider.  Document Revised: 09/09/2020 Document Reviewed: 07/17/2020  Elsevier Patient Education © 2021 [employer]

## 2021-11-11 NOTE — PROGRESS NOTES
"[facility]      Chief Complaint:   Chief Complaint   Patient presents with   • Difficulty Swallowing       Subjective     HPI:   Patient with dysphagia.  Patient states that food is getting caught when she swallows pills though there is a pocket in her esophagus.  Patient denies any nausea or vomiting.  Patient has had dilatation in the past and states that this does improve the symptoms.  Patient is currently having worsening of symptoms and will need repeat upper endoscopy to evaluate this area.    Plan; we will schedule patient for EGD with dilatation    Past Medical History:   Past Medical History:   Diagnosis Date   • Anesthesia complication     states she \"stopped breathing during her last procedure\"   • Arthritis    • Elevated cholesterol    • Endometriosis    • Falls    • GERD (gastroesophageal reflux disease)    • Osteoporosis    • Scoliosis of thoracolumbar spine    • Seasonal rhinitis    • Sinusitis        Past Surgical History:  Past Surgical History:   Procedure Laterality Date   • APPENDECTOMY     • COLONOSCOPY     • COLONOSCOPY N/A 6/30/2020    Procedure: COLONOSCOPY;  Surgeon: Evan Rondon MD;  Location: [facility] ENDOSCOPY;  Service: Gastroenterology;  Laterality: N/A;   • ENDOSCOPY N/A 10/16/2019    Procedure: ESOPHAGOGASTRODUODENOSCOPY;  Surgeon: Evan Rondon MD;  Location: [facility] ENDOSCOPY;  Service: Gastroenterology   • ENDOSCOPY N/A 6/30/2020    Procedure: ESOPHAGOGASTRODUODENOSCOPY;  Surgeon: Evan Rondon MD;  Location: [facility] ENDOSCOPY;  Service: Gastroenterology;  Laterality: N/A;  savory dilation 48-54   • FOREARM SURGERY     • HYSTERECTOMY  1984    total   • SALPINGO OOPHORECTOMY     • UPPER GASTROINTESTINAL ENDOSCOPY  10/16/2019   • UPPER GASTROINTESTINAL ENDOSCOPY  06/30/2020   • WRIST FRACTURE SURGERY Right        Family History:  Family History   Problem Relation Age of Onset   • Heart failure Mother    • Thyroid disease Mother    • Ulcerative colitis " Father        Social History:   reports that she quit smoking about 19 years ago. Her smoking use included cigarettes. She quit after 20.00 years of use. She has never used smokeless tobacco. She reports that she does not drink alcohol and does not use drugs.    Medications:   Prior to Admission medications    Medication Sig Start Date End Date Taking? Authorizing Provider   albuterol sulfate  (90 Base) MCG/ACT inhaler Inhale 2 puffs Every 4 (Four) Hours As Needed for Wheezing.   Yes Roosevelt Bryant MD   Biotin 1 MG capsule Take 1 capsule by mouth Daily.   Yes Roosevelt Bryant MD   Calcium-Magnesium-Vitamin D - MG-MG-UNIT tablet sustained-release 24 hour Take 2 tablets by mouth Daily.   Yes Roosevelt Bryant MD   Cranberry 500 MG tablet Take 500 mg by mouth 2 (two) times a day.   Yes Roosevelt Bryant MD   dicyclomine (BENTYL) 20 MG tablet Take 1 tablet by mouth Every 6 (Six) Hours. 9/1/21  Yes Evan Rondon MD   EPINEPHrine (EPIPEN) 0.3 MG/0.3ML solution auto-injector injection Inject 0.3 mL into the appropriate muscle as directed by prescriber Take As Directed. 8/18/21  Yes Neena Kee APRN   famotidine (PEPCID) 20 MG tablet Take 1 tablet by mouth Daily. 9/1/21  Yes Evan Rondon MD   fexofenadine (Allegra Allergy) 180 MG tablet Take 1 tablet by mouth Daily. 7/1/21  Yes Mariah De Jesus, DO   fluticasone (FLONASE) 50 MCG/ACT nasal spray 2 sprays into each nostril daily 7/1/21  Yes Mariah De Jesus, DO   Fluticasone Furoate-Vilanterol (BREO ELLIPTA) 200-25 MCG/INH inhaler Inhale 1 puff Daily. 9/21/21  Yes Mariah De Jesus, DO   ipratropium-albuterol (COMBIVENT RESPIMAT)  MCG/ACT inhaler Inhale 1 puff 4 (Four) Times a Day As Needed for Shortness of Air. 1/14/20  Yes Noah Briggs APRN   ipratropium-albuterol (DUO-NEB) 0.5-2.5 mg/3 ml nebulizer Take 3 mL by nebulization Every 4 (Four) Hours As Needed for Wheezing or Shortness of Air. 9/24/21  Yes  "Mariah De Jesus, DO   meloxicam (Mobic) 15 MG tablet Take 1 tablet by mouth Daily. 9/24/21  Yes Mariah De Jesus, DO   olopatadine (PATANASE) 0.6 % solution nasal solution 2 sprays by Each Nare route 2 (Two) Times a Day. 7/1/21  Yes Mariah De Jesus, DO   pantoprazole (PROTONIX) 40 MG EC tablet Take 1 tablet by mouth Daily. 9/1/21  Yes Evan Rondon MD   Potassium Iodide, Expectorant, (SSKI) 1 GM/ML solution Take 0.6 mL by mouth 3 (Three) Times a Day. 12/2/20  Yes Jorgito Evans MD       Allergies:  Sulfa antibiotics, Levaquin [levofloxacin], Augmentin [amoxicillin-pot clavulanate], and Cefuroxime    ROS:    Review of Systems   Constitutional: Negative for activity change, appetite change, chills, diaphoresis, fatigue, fever and unexpected weight change.   HENT: Negative for sore throat and trouble swallowing.    Respiratory: Negative for shortness of breath.    Gastrointestinal: Negative for abdominal distention, abdominal pain, anal bleeding, blood in stool, constipation, diarrhea, nausea, rectal pain and vomiting.   Endocrine: Negative for polydipsia, polyphagia and polyuria.   Genitourinary: Negative for difficulty urinating.   Musculoskeletal: Negative for arthralgias.   Skin: Negative for pallor.   Allergic/Immunologic: Negative for food allergies.   Neurological: Negative for weakness and light-headedness.   Psychiatric/Behavioral: Negative for behavioral problems.     Objective     Blood pressure 143/87, pulse 79, height 166.4 cm (65.5\"), weight 75.9 kg (167 lb 6.4 oz).    Physical Exam  Constitutional:       General: She is not in acute distress.     Appearance: She is well-developed. She is not diaphoretic.   HENT:      Head: Normocephalic and atraumatic.   Cardiovascular:      Rate and Rhythm: Normal rate and regular rhythm.      Heart sounds: Normal heart sounds. No murmur heard.  No friction rub. No gallop.    Pulmonary:      Effort: No respiratory distress.      Breath sounds: Normal breath " sounds. No wheezing or rales.   Chest:      Chest wall: No tenderness.   Abdominal:      General: Bowel sounds are normal. There is no distension.      Palpations: Abdomen is soft. There is no mass.      Tenderness: There is no abdominal tenderness. There is no guarding or rebound.      Hernia: No hernia is present.   Musculoskeletal:         General: Normal range of motion.   Skin:     General: Skin is warm and dry.      Coloration: Skin is not pale.      Findings: No erythema or rash.   Neurological:      Mental Status: She is alert and oriented to person, place, and time.   Psychiatric:         Behavior: Behavior normal.         Thought Content: Thought content normal.         Judgment: Judgment normal.          Assessment/Plan   Diagnoses and all orders for this visit:    1. Other dysphagia (Primary)  -     Case Request; Standing  -     COVID PRE-OP / PRE-PROCEDURE SCREENING ORDER (NO ISOLATION) - Swab, Nasopharynx; Future  -     Case Request    Other orders  -     Follow Anesthesia Guidelines / Standing Orders; Future  -     Obtain Informed Consent; Future        ESOPHAGOGASTRODUODENOSCOPY (N/A)     Diagnosis Plan   1. Other dysphagia  Case Request    COVID PRE-OP / PRE-PROCEDURE SCREENING ORDER (NO ISOLATION) - Swab, Nasopharynx    Case Request       Anticipated Surgical Procedure:  Orders Placed This Encounter   Procedures   • COVID PRE-OP / PRE-PROCEDURE SCREENING ORDER (NO ISOLATION) - Swab, Nasopharynx     Standing Status:   Future     Standing Expiration Date:   11/11/2022     Order Specific Question:   Release to patient     Answer:   Immediate     Order Specific Question:   Please select your location:     Answer:   [facility]     Order Specific Question:   COVID Screening Order:     Answer:   In-House: PRE-OP: BD MAX, 8-10 HR TAT [[id number]]     Order Specific Question:   Previously tested for COVID-19?     Answer:   Yes     Order Specific Question:   Employed in healthcare setting?     Answer:   No      Order Specific Question:   Symptomatic for COVID-19 as defined by CDC?     Answer:   No     Order Specific Question:   Hospitalized for COVID-19?     Answer:   No     Order Specific Question:   Admitted to ICU for COVID-19?     Answer:   No     Order Specific Question:   Resident in a congregate (group) care setting?     Answer:   No     Order Specific Question:   Pregnant?     Answer:   No   • Follow Anesthesia Guidelines / Standing Orders     Standing Status:   Future   • Obtain Informed Consent     Standing Status:   Future     Order Specific Question:   Informed Consent Given For     Answer:   ESOPHAGOGASTRODUODENOSCOPY       The risks, benefits, and alternatives of this procedure have been discussed with the patient or the responsible party- the patient understands and agrees to proceed.

## 2021-11-11 NOTE — H&P (VIEW-ONLY)
"[facility]      Chief Complaint:   Chief Complaint   Patient presents with   • Difficulty Swallowing       Subjective     HPI:   Patient with dysphagia.  Patient states that food is getting caught when she swallows pills though there is a pocket in her esophagus.  Patient denies any nausea or vomiting.  Patient has had dilatation in the past and states that this does improve the symptoms.  Patient is currently having worsening of symptoms and will need repeat upper endoscopy to evaluate this area.    Plan; we will schedule patient for EGD with dilatation    Past Medical History:   Past Medical History:   Diagnosis Date   • Anesthesia complication     states she \"stopped breathing during her last procedure\"   • Arthritis    • Elevated cholesterol    • Endometriosis    • Falls    • GERD (gastroesophageal reflux disease)    • Osteoporosis    • Scoliosis of thoracolumbar spine    • Seasonal rhinitis    • Sinusitis        Past Surgical History:  Past Surgical History:   Procedure Laterality Date   • APPENDECTOMY     • COLONOSCOPY     • COLONOSCOPY N/A 6/30/2020    Procedure: COLONOSCOPY;  Surgeon: Evan Rondon MD;  Location: [facility] ENDOSCOPY;  Service: Gastroenterology;  Laterality: N/A;   • ENDOSCOPY N/A 10/16/2019    Procedure: ESOPHAGOGASTRODUODENOSCOPY;  Surgeon: Evan Rondon MD;  Location: [facility] ENDOSCOPY;  Service: Gastroenterology   • ENDOSCOPY N/A 6/30/2020    Procedure: ESOPHAGOGASTRODUODENOSCOPY;  Surgeon: Evan Rondon MD;  Location: [facility] ENDOSCOPY;  Service: Gastroenterology;  Laterality: N/A;  savory dilation 48-54   • FOREARM SURGERY     • HYSTERECTOMY  1984    total   • SALPINGO OOPHORECTOMY     • UPPER GASTROINTESTINAL ENDOSCOPY  10/16/2019   • UPPER GASTROINTESTINAL ENDOSCOPY  06/30/2020   • WRIST FRACTURE SURGERY Right        Family History:  Family History   Problem Relation Age of Onset   • Heart failure Mother    • Thyroid disease Mother    • Ulcerative colitis " Father        Social History:   reports that she quit smoking about 19 years ago. Her smoking use included cigarettes. She quit after 20.00 years of use. She has never used smokeless tobacco. She reports that she does not drink alcohol and does not use drugs.    Medications:   Prior to Admission medications    Medication Sig Start Date End Date Taking? Authorizing Provider   albuterol sulfate  (90 Base) MCG/ACT inhaler Inhale 2 puffs Every 4 (Four) Hours As Needed for Wheezing.   Yes Roosevelt Bryant MD   Biotin 1 MG capsule Take 1 capsule by mouth Daily.   Yes oRosevelt Bryant MD   Calcium-Magnesium-Vitamin D - MG-MG-UNIT tablet sustained-release 24 hour Take 2 tablets by mouth Daily.   Yes Roosevelt Bryant MD   Cranberry 500 MG tablet Take 500 mg by mouth 2 (two) times a day.   Yes Roosevelt Bryant MD   dicyclomine (BENTYL) 20 MG tablet Take 1 tablet by mouth Every 6 (Six) Hours. 9/1/21  Yes Evan Rondon MD   EPINEPHrine (EPIPEN) 0.3 MG/0.3ML solution auto-injector injection Inject 0.3 mL into the appropriate muscle as directed by prescriber Take As Directed. 8/18/21  Yes Neena Kee APRN   famotidine (PEPCID) 20 MG tablet Take 1 tablet by mouth Daily. 9/1/21  Yes Evan Rondon MD   fexofenadine (Allegra Allergy) 180 MG tablet Take 1 tablet by mouth Daily. 7/1/21  Yes Mariah eD Jesus, DO   fluticasone (FLONASE) 50 MCG/ACT nasal spray 2 sprays into each nostril daily 7/1/21  Yes Mariah De Jesus, DO   Fluticasone Furoate-Vilanterol (BREO ELLIPTA) 200-25 MCG/INH inhaler Inhale 1 puff Daily. 9/21/21  Yes Mariah De Jesus, DO   ipratropium-albuterol (COMBIVENT RESPIMAT)  MCG/ACT inhaler Inhale 1 puff 4 (Four) Times a Day As Needed for Shortness of Air. 1/14/20  Yes Noah Briggs APRN   ipratropium-albuterol (DUO-NEB) 0.5-2.5 mg/3 ml nebulizer Take 3 mL by nebulization Every 4 (Four) Hours As Needed for Wheezing or Shortness of Air. 9/24/21  Yes  "Mariah De Jesus, DO   meloxicam (Mobic) 15 MG tablet Take 1 tablet by mouth Daily. 9/24/21  Yes Mariah De Jesus, DO   olopatadine (PATANASE) 0.6 % solution nasal solution 2 sprays by Each Nare route 2 (Two) Times a Day. 7/1/21  Yes Mariah De Jesus, DO   pantoprazole (PROTONIX) 40 MG EC tablet Take 1 tablet by mouth Daily. 9/1/21  Yes Evan Rondon MD   Potassium Iodide, Expectorant, (SSKI) 1 GM/ML solution Take 0.6 mL by mouth 3 (Three) Times a Day. 12/2/20  Yes Jorgito Evans MD       Allergies:  Sulfa antibiotics, Levaquin [levofloxacin], Augmentin [amoxicillin-pot clavulanate], and Cefuroxime    ROS:    Review of Systems   Constitutional: Negative for activity change, appetite change, chills, diaphoresis, fatigue, fever and unexpected weight change.   HENT: Negative for sore throat and trouble swallowing.    Respiratory: Negative for shortness of breath.    Gastrointestinal: Negative for abdominal distention, abdominal pain, anal bleeding, blood in stool, constipation, diarrhea, nausea, rectal pain and vomiting.   Endocrine: Negative for polydipsia, polyphagia and polyuria.   Genitourinary: Negative for difficulty urinating.   Musculoskeletal: Negative for arthralgias.   Skin: Negative for pallor.   Allergic/Immunologic: Negative for food allergies.   Neurological: Negative for weakness and light-headedness.   Psychiatric/Behavioral: Negative for behavioral problems.     Objective     Blood pressure 143/87, pulse 79, height 166.4 cm (65.5\"), weight 75.9 kg (167 lb 6.4 oz).    Physical Exam  Constitutional:       General: She is not in acute distress.     Appearance: She is well-developed. She is not diaphoretic.   HENT:      Head: Normocephalic and atraumatic.   Cardiovascular:      Rate and Rhythm: Normal rate and regular rhythm.      Heart sounds: Normal heart sounds. No murmur heard.  No friction rub. No gallop.    Pulmonary:      Effort: No respiratory distress.      Breath sounds: Normal breath " sounds. No wheezing or rales.   Chest:      Chest wall: No tenderness.   Abdominal:      General: Bowel sounds are normal. There is no distension.      Palpations: Abdomen is soft. There is no mass.      Tenderness: There is no abdominal tenderness. There is no guarding or rebound.      Hernia: No hernia is present.   Musculoskeletal:         General: Normal range of motion.   Skin:     General: Skin is warm and dry.      Coloration: Skin is not pale.      Findings: No erythema or rash.   Neurological:      Mental Status: She is alert and oriented to person, place, and time.   Psychiatric:         Behavior: Behavior normal.         Thought Content: Thought content normal.         Judgment: Judgment normal.          Assessment/Plan   Diagnoses and all orders for this visit:    1. Other dysphagia (Primary)  -     Case Request; Standing  -     COVID PRE-OP / PRE-PROCEDURE SCREENING ORDER (NO ISOLATION) - Swab, Nasopharynx; Future  -     Case Request    Other orders  -     Follow Anesthesia Guidelines / Standing Orders; Future  -     Obtain Informed Consent; Future        ESOPHAGOGASTRODUODENOSCOPY (N/A)     Diagnosis Plan   1. Other dysphagia  Case Request    COVID PRE-OP / PRE-PROCEDURE SCREENING ORDER (NO ISOLATION) - Swab, Nasopharynx    Case Request       Anticipated Surgical Procedure:  Orders Placed This Encounter   Procedures   • COVID PRE-OP / PRE-PROCEDURE SCREENING ORDER (NO ISOLATION) - Swab, Nasopharynx     Standing Status:   Future     Standing Expiration Date:   11/11/2022     Order Specific Question:   Release to patient     Answer:   Immediate     Order Specific Question:   Please select your location:     Answer:   [facility]     Order Specific Question:   COVID Screening Order:     Answer:   In-House: PRE-OP: BD MAX, 8-10 HR TAT [[id number]]     Order Specific Question:   Previously tested for COVID-19?     Answer:   Yes     Order Specific Question:   Employed in healthcare setting?     Answer:   No      Order Specific Question:   Symptomatic for COVID-19 as defined by CDC?     Answer:   No     Order Specific Question:   Hospitalized for COVID-19?     Answer:   No     Order Specific Question:   Admitted to ICU for COVID-19?     Answer:   No     Order Specific Question:   Resident in a congregate (group) care setting?     Answer:   No     Order Specific Question:   Pregnant?     Answer:   No   • Follow Anesthesia Guidelines / Standing Orders     Standing Status:   Future   • Obtain Informed Consent     Standing Status:   Future     Order Specific Question:   Informed Consent Given For     Answer:   ESOPHAGOGASTRODUODENOSCOPY       The risks, benefits, and alternatives of this procedure have been discussed with the patient or the responsible party- the patient understands and agrees to proceed.

## 2021-11-15 ENCOUNTER — LAB (OUTPATIENT)
Dept: LAB | Facility: HOSPITAL | Age: 66
End: 2021-11-15

## 2021-11-15 DIAGNOSIS — R13.19 OTHER DYSPHAGIA: ICD-10-CM

## 2021-11-15 LAB — SARS-COV-2 N GENE RESP QL NAA+PROBE: NOT DETECTED

## 2021-11-15 PROCEDURE — C9803 HOPD COVID-19 SPEC COLLECT: HCPCS

## 2021-11-15 PROCEDURE — 87635 SARS-COV-2 COVID-19 AMP PRB: CPT

## 2021-11-17 ENCOUNTER — HOSPITAL ENCOUNTER (OUTPATIENT)
Facility: HOSPITAL | Age: 66
Setting detail: HOSPITAL OUTPATIENT SURGERY
Discharge: HOME | End: 2021-11-17
Attending: INTERNAL MEDICINE | Admitting: INTERNAL MEDICINE

## 2021-11-17 ENCOUNTER — ANESTHESIA EVENT (OUTPATIENT)
Dept: GASTROENTEROLOGY | Facility: HOSPITAL | Age: 66
End: 2021-11-17

## 2021-11-17 ENCOUNTER — ANESTHESIA (OUTPATIENT)
Dept: GASTROENTEROLOGY | Facility: HOSPITAL | Age: 66
End: 2021-11-17

## 2021-11-17 VITALS
TEMPERATURE: 97.3 F | SYSTOLIC BLOOD PRESSURE: 128 MMHG | BODY MASS INDEX: 27.49 KG/M2 | OXYGEN SATURATION: 96 % | WEIGHT: 165 LBS | HEART RATE: 79 BPM | DIASTOLIC BLOOD PRESSURE: 69 MMHG | HEIGHT: 65 IN | RESPIRATION RATE: 18 BRPM

## 2021-11-17 DIAGNOSIS — R13.19 OTHER DYSPHAGIA: ICD-10-CM

## 2021-11-17 PROCEDURE — 43239 EGD BIOPSY SINGLE/MULTIPLE: CPT | Performed by: INTERNAL MEDICINE

## 2021-11-17 PROCEDURE — 43248 EGD GUIDE WIRE INSERTION: CPT | Performed by: INTERNAL MEDICINE

## 2021-11-17 PROCEDURE — 25010000002 PROPOFOL 10 MG/ML EMULSION: Performed by: NURSE ANESTHETIST, CERTIFIED REGISTERED

## 2021-11-17 RX ORDER — PROPOFOL 10 MG/ML
VIAL (ML) INTRAVENOUS AS NEEDED
Status: DISCONTINUED | OUTPATIENT
Start: 2021-11-17 | End: 2021-11-17 | Stop reason: SURG

## 2021-11-17 RX ORDER — PROMETHAZINE HYDROCHLORIDE 25 MG/1
25 SUPPOSITORY RECTAL ONCE AS NEEDED
Status: DISCONTINUED | OUTPATIENT
Start: 2021-11-17 | End: 2021-11-17 | Stop reason: HOSPADM

## 2021-11-17 RX ORDER — ONDANSETRON 2 MG/ML
4 INJECTION INTRAMUSCULAR; INTRAVENOUS ONCE AS NEEDED
Status: DISCONTINUED | OUTPATIENT
Start: 2021-11-17 | End: 2021-11-17 | Stop reason: HOSPADM

## 2021-11-17 RX ORDER — PROMETHAZINE HYDROCHLORIDE 25 MG/1
25 TABLET ORAL ONCE AS NEEDED
Status: DISCONTINUED | OUTPATIENT
Start: 2021-11-17 | End: 2021-11-17 | Stop reason: HOSPADM

## 2021-11-17 RX ORDER — MEPERIDINE HYDROCHLORIDE 25 MG/ML
12.5 INJECTION INTRAMUSCULAR; INTRAVENOUS; SUBCUTANEOUS
Status: DISCONTINUED | OUTPATIENT
Start: 2021-11-17 | End: 2021-11-17 | Stop reason: HOSPADM

## 2021-11-17 RX ORDER — ACETAMINOPHEN AND CODEINE PHOSPHATE 300; 30 MG/1; MG/1
1 TABLET ORAL 2 TIMES DAILY PRN
COMMUNITY
End: 2022-07-21 | Stop reason: SDUPTHER

## 2021-11-17 RX ORDER — LIDOCAINE HYDROCHLORIDE 20 MG/ML
INJECTION, SOLUTION INTRAVENOUS AS NEEDED
Status: DISCONTINUED | OUTPATIENT
Start: 2021-11-17 | End: 2021-11-17 | Stop reason: SURG

## 2021-11-17 RX ORDER — DEXTROSE AND SODIUM CHLORIDE 5; .45 G/100ML; G/100ML
30 INJECTION, SOLUTION INTRAVENOUS CONTINUOUS PRN
Status: DISCONTINUED | OUTPATIENT
Start: 2021-11-17 | End: 2021-11-17 | Stop reason: HOSPADM

## 2021-11-17 RX ADMIN — PROPOFOL 100 MG: 10 INJECTION, EMULSION INTRAVENOUS at 15:24

## 2021-11-17 RX ADMIN — LIDOCAINE HYDROCHLORIDE 100 MG: 20 INJECTION, SOLUTION INTRAVENOUS at 15:24

## 2021-11-17 RX ADMIN — PROPOFOL 50 MG: 10 INJECTION, EMULSION INTRAVENOUS at 15:27

## 2021-11-17 RX ADMIN — DEXTROSE AND SODIUM CHLORIDE 30 ML/HR: 5; 450 INJECTION, SOLUTION INTRAVENOUS at 15:02

## 2021-11-17 NOTE — ANESTHESIA POSTPROCEDURE EVALUATION
Patient: Genna Garcia    Procedure Summary     Date: 11/17/21 Room / Location: [facility] ENDOSCOPY 1 / [facility] ENDOSCOPY    Anesthesia Start: 1520 Anesthesia Stop: 1533    Procedure: ESOPHAGOGASTRODUODENOSCOPY (N/A ) Diagnosis:       Other dysphagia      (Other dysphagia [R13.19])    Surgeons: Evan Rondon MD Provider: Jason Flores CRNA    Anesthesia Type: MAC ASA Status: 3          Anesthesia Type: MAC    Vitals  No vitals data found for the desired time range.          Post Anesthesia Care and Evaluation    Patient location during evaluation: bedside  Patient participation: complete - patient cannot participate  Level of consciousness: awake  Pain score: 0  Pain management: adequate  Airway patency: patent  Anesthetic complications: No anesthetic complications  PONV Status: none  Cardiovascular status: acceptable  Respiratory status: acceptable  Hydration status: acceptable

## 2021-11-22 LAB
LAB AP CASE REPORT: NORMAL
PATH REPORT.FINAL DX SPEC: NORMAL

## 2021-12-03 ENCOUNTER — OFFICE VISIT (OUTPATIENT)
Dept: GASTROENTEROLOGY | Facility: CLINIC | Age: 66
End: 2021-12-03

## 2021-12-03 VITALS
HEART RATE: 98 BPM | DIASTOLIC BLOOD PRESSURE: 81 MMHG | WEIGHT: 166.2 LBS | HEIGHT: 66 IN | SYSTOLIC BLOOD PRESSURE: 131 MMHG | BODY MASS INDEX: 26.71 KG/M2

## 2021-12-03 DIAGNOSIS — R13.19 OTHER DYSPHAGIA: Primary | ICD-10-CM

## 2021-12-03 PROCEDURE — 99214 OFFICE O/P EST MOD 30 MIN: CPT | Performed by: INTERNAL MEDICINE

## 2021-12-03 NOTE — PROGRESS NOTES
"[facility]      Chief Complaint:   Chief Complaint   Patient presents with   • EGD Performed 11/17/2021       Subjective     HPI:   Patient with recent EGD with dilatation.  Patient states that she feels much better at this time.  Patient is able to take her medications and eat without difficulty.  Patient is on a proton pump inhibitor doing well with this.    Plan; outpatient follow-up in 6 months continue patient on proton pump inhibitor discussed with patient that if his dysphagia returns she will need to have repeat dilatation    Past Medical History:   Past Medical History:   Diagnosis Date   • Anesthesia complication     states she \"stopped breathing during her last procedure\"   • Arthritis    • Asthma    • Elevated cholesterol    • Endometriosis    • Falls    • GERD (gastroesophageal reflux disease)    • Osteoporosis    • Pulmonary fibrosis (HCC)    • Scoliosis of thoracolumbar spine    • Seasonal rhinitis    • Sinusitis        Past Surgical History:    Past Surgical History:   Procedure Laterality Date   • APPENDECTOMY     • COLONOSCOPY     • COLONOSCOPY N/A 6/30/2020    Procedure: COLONOSCOPY;  Surgeon: Evan Rondon MD;  Location: [facility] ENDOSCOPY;  Service: Gastroenterology;  Laterality: N/A;   • ENDOSCOPY N/A 10/16/2019    Procedure: ESOPHAGOGASTRODUODENOSCOPY;  Surgeon: Evan Rondon MD;  Location: [facility] ENDOSCOPY;  Service: Gastroenterology   • ENDOSCOPY N/A 6/30/2020    Procedure: ESOPHAGOGASTRODUODENOSCOPY;  Surgeon: Evan Rondon MD;  Location: [facility] ENDOSCOPY;  Service: Gastroenterology;  Laterality: N/A;  savory dilation 48-54   • ENDOSCOPY N/A 11/17/2021    Procedure: ESOPHAGOGASTRODUODENOSCOPY;  Surgeon: Evan Rondon MD;  Location: [facility] ENDOSCOPY;  Service: Gastroenterology;  Laterality: N/A;  48-54 f eso dil. blue   • FOREARM SURGERY     • HYSTERECTOMY  1984    total   • SALPINGO OOPHORECTOMY     • UPPER GASTROINTESTINAL ENDOSCOPY  10/16/2019   • UPPER " GASTROINTESTINAL ENDOSCOPY  06/30/2020   • UPPER GASTROINTESTINAL ENDOSCOPY  11/17/2021   • WRIST FRACTURE SURGERY Right        Family History:  Family History   Problem Relation Age of Onset   • Heart failure Mother    • Thyroid disease Mother    • Ulcerative colitis Father        Social History:   reports that she quit smoking about 19 years ago. Her smoking use included cigarettes. She has a 50.00 pack-year smoking history. She has never used smokeless tobacco. She reports that she does not drink alcohol and does not use drugs.    Medications:   Prior to Admission medications    Medication Sig Start Date End Date Taking? Authorizing Provider   acetaminophen-codeine (TYLENOL #3) 300-30 MG per tablet Take 1 tablet by mouth 2 (Two) Times a Day As Needed for Moderate Pain .   Yes Roosevelt Bryant MD   albuterol sulfate  (90 Base) MCG/ACT inhaler Inhale 2 puffs Every 4 (Four) Hours As Needed for Wheezing.   Yes Roosevelt Bryant MD   Biotin 1 MG capsule Take 1 capsule by mouth Daily.   Yes Roosevelt Bryant MD   Calcium-Magnesium-Vitamin D - MG-MG-UNIT tablet sustained-release 24 hour Take 2 tablets by mouth Daily.   Yes Roosevelt Bryant MD   Cranberry 500 MG tablet Take 500 mg by mouth 2 (two) times a day.   Yes Roosevelt Bryant MD   dicyclomine (BENTYL) 20 MG tablet Take 1 tablet by mouth Every 6 (Six) Hours. 9/1/21  Yes Evan Rondon MD   EPINEPHrine (EPIPEN) 0.3 MG/0.3ML solution auto-injector injection Inject 0.3 mL into the appropriate muscle as directed by prescriber Take As Directed. 8/18/21  Yes Neena Kee APRN   famotidine (PEPCID) 20 MG tablet Take 1 tablet by mouth Daily. 9/1/21  Yes Evan Rondon MD   fexofenadine (Allegra Allergy) 180 MG tablet Take 1 tablet by mouth Daily. 7/1/21  Yes Mariah De Jesus,    fluticasone (FLONASE) 50 MCG/ACT nasal spray 2 sprays into each nostril daily 7/1/21  Yes Mariah De Jesus, DO   Fluticasone Furoate-Vilanterol  "(BREO ELLIPTA) 200-25 MCG/INH inhaler Inhale 1 puff Daily. 9/21/21  Yes Mariah De Jesus, DO   ipratropium-albuterol (DUO-NEB) 0.5-2.5 mg/3 ml nebulizer Take 3 mL by nebulization Every 4 (Four) Hours As Needed for Wheezing or Shortness of Air. 9/24/21  Yes Mariah De Jesus, DO   olopatadine (PATANASE) 0.6 % solution nasal solution 2 sprays by Each Nare route 2 (Two) Times a Day. 7/1/21  Yes Mariah De Jesus, DO   pantoprazole (PROTONIX) 40 MG EC tablet Take 1 tablet by mouth Daily. 9/1/21  Yes Evan Rondon MD   Potassium Iodide, Expectorant, (SSKI) 1 GM/ML solution Take 0.6 mL by mouth 3 (Three) Times a Day. 12/2/20  Yes Jorgito Evans MD       Allergies:  Sulfa antibiotics, Levaquin [levofloxacin], Augmentin [amoxicillin-pot clavulanate], and Cefuroxime    ROS:    Review of Systems   Constitutional: Negative for activity change, appetite change, chills, diaphoresis, fatigue, fever and unexpected weight change.   HENT: Negative for sore throat and trouble swallowing.    Respiratory: Negative for shortness of breath.    Gastrointestinal: Negative for abdominal distention, abdominal pain, anal bleeding, blood in stool, constipation, diarrhea, nausea, rectal pain and vomiting.   Endocrine: Negative for polydipsia, polyphagia and polyuria.   Genitourinary: Negative for difficulty urinating.   Musculoskeletal: Negative for arthralgias.   Skin: Negative for pallor.   Allergic/Immunologic: Negative for food allergies.   Neurological: Negative for weakness and light-headedness.   Psychiatric/Behavioral: Negative for behavioral problems.     Objective     Blood pressure 131/81, pulse 98, height 166.4 cm (65.5\"), weight 75.4 kg (166 lb 3.2 oz).    Physical Exam  Constitutional:       General: She is not in acute distress.     Appearance: She is well-developed. She is not diaphoretic.   HENT:      Head: Normocephalic and atraumatic.   Cardiovascular:      Rate and Rhythm: Normal rate and regular rhythm.      Heart " sounds: Normal heart sounds. No murmur heard.  No friction rub. No gallop.    Pulmonary:      Effort: No respiratory distress.      Breath sounds: Normal breath sounds. No wheezing or rales.   Chest:      Chest wall: No tenderness.   Abdominal:      General: Bowel sounds are normal. There is no distension.      Palpations: Abdomen is soft. There is no mass.      Tenderness: There is no abdominal tenderness. There is no guarding or rebound.      Hernia: No hernia is present.   Musculoskeletal:         General: Normal range of motion.   Skin:     General: Skin is warm and dry.      Coloration: Skin is not pale.      Findings: No erythema or rash.   Neurological:      Mental Status: She is alert and oriented to person, place, and time.   Psychiatric:         Behavior: Behavior normal.         Thought Content: Thought content normal.         Judgment: Judgment normal.          Assessment/Plan   Diagnoses and all orders for this visit:    1. Other dysphagia (Primary)        * Surgery not found *     Diagnosis Plan   1. Other dysphagia         Anticipated Surgical Procedure:  No orders of the defined types were placed in this encounter.      The risks, benefits, and alternatives of this procedure have been discussed with the patient or the responsible party- the patient understands and agrees to proceed.

## 2021-12-03 NOTE — PATIENT INSTRUCTIONS
"Sunil's Neurology in Clinical Practice (8th ed., pp. 152-163). Jocelin PA: Elsevier.\"> Andrew Textbook of Surgery (21st ed., pp. 1215-3344). [place]: Elsevier[employer] Inc.\">   Dysphagia    Dysphagia is trouble swallowing. This condition occurs when solids and liquids stick in a person's throat on the way down to the stomach, or when food takes longer to get to the stomach than usual.  You may have problems swallowing food, liquids, or both. You may also have pain while trying to swallow. It may take you more time and effort to swallow something.  What are the causes?  This condition may be caused by:  · Muscle problems. They may make it difficult for you to move food and liquids through the esophagus, which is the tube that connects your mouth to your stomach.  · Blockages. You may have ulcers, scar tissue, or inflammation that blocks the normal passage of food and liquids. Causes of these problems include:  ? Acid reflux from your stomach into your esophagus (gastroesophageal reflux).  ? Infections.  ? Radiation treatment for cancer.  ? Medicines taken without enough fluids to wash them down into your stomach.  · Stroke. This can affect the nerves and make it difficult to swallow.  · Nerve problems. These prevent signals from being sent to the muscles of your esophagus to squeeze (contract) and move what you swallow down to your stomach.  · Globus pharyngeus. This is a common problem that involves a feeling like something is stuck in your throat or a sense of trouble with swallowing, even though nothing is wrong with the swallowing passages.  · Certain conditions, such as cerebral palsy or Parkinson's disease.  What are the signs or symptoms?  Common symptoms of this condition include:  · A feeling that solids or liquids are stuck in your throat on the way down to the stomach.  · Pain while swallowing.  · Coughing or gagging while trying to swallow.  Other symptoms include:  · Food moving back from your " stomach to your mouth (regurgitation).  · Noises coming from your throat.  · Chest discomfort with swallowing.  · A feeling of fullness when swallowing.  · Drooling, especially when the throat is blocked.  · Heartburn.  How is this diagnosed?  This condition may be diagnosed by:  · Barium X-ray. In this test, you will swallow a white liquid that sticks to the inside of your esophagus. X-ray images are then taken.  · Endoscopy. In this test, a flexible telescope is inserted down your throat to look at your esophagus and your stomach.  · CT scans and an MRI.  How is this treated?  Treatment for dysphagia depends on the cause of this condition, such as:  · If the dysphagia is caused by acid reflux or infection, medicines may be used. They may include antibiotics and heartburn medicines.  · If the dysphagia is caused by problems with the muscles, swallowing therapy may be used to help you strengthen your swallowing muscles. You may have to do specific exercises to strengthen the muscles or stretch them.  · If the dysphagia is caused by a blockage or mass, procedures to remove the blockage may be done. You may need surgery and a feeding tube.  You may need to make diet changes. Ask your health care provider for specific instructions.  Follow these instructions at home:  Medicines  · Take over-the-counter and prescription medicines only as told by your health care provider.  · If you were prescribed an antibiotic medicine, take it as told by your health care provider. Do not stop taking the antibiotic even if you start to feel better.  Eating and drinking    · Follow any diet changes as told by your health care provider.  · Work with a diet and nutrition specialist (dietitian) to create an eating plan that will help you get the nutrients you need in order to stay healthy.  · Eat soft foods that are easier to swallow.  · Cut your food into small pieces and eat slowly. Take small bites.  · Eat and drink only when you are  sitting upright.  · Do not drink alcohol or caffeine. If you need help quitting, ask your health care provider.    General instructions  · Check your weight every day to make sure you are not losing weight.  · Do not use any products that contain nicotine or tobacco, such as cigarettes, e-cigarettes, and chewing tobacco. If you need help quitting, ask your health care provider.  · Keep all follow-up visits as told by your health care provider. This is important.  Contact a health care provider if you:  · Lose weight because you cannot swallow.  · Cough when you drink liquids.  · Cough up partially digested food.  Get help right away if you:  · Cannot swallow your saliva.  · Have shortness of breath, a fever, or both.  · Have a hoarse voice and also have trouble swallowing.  Summary  · Dysphagia is trouble swallowing. This condition occurs when solids and liquids stick in a person's throat on the way down to the stomach. You may cough or gag while trying to swallow.  · Dysphagia has many possible causes.  · Treatment for dysphagia depends on the cause of the condition.  · Keep all follow-up visits as told by your health care provider. This is important.  This information is not intended to replace advice given to you by your health care provider. Make sure you discuss any questions you have with your health care provider.  Document Revised: 05/13/2020 Document Reviewed: 05/13/2020  [employer] Patient Education © 2021 Elsevier Inc.

## 2021-12-16 ENCOUNTER — OFFICE VISIT (OUTPATIENT)
Dept: PULMONOLOGY | Facility: CLINIC | Age: 66
End: 2021-12-16

## 2021-12-16 VITALS
SYSTOLIC BLOOD PRESSURE: 124 MMHG | WEIGHT: 170 LBS | DIASTOLIC BLOOD PRESSURE: 82 MMHG | TEMPERATURE: 97.1 F | HEART RATE: 62 BPM | BODY MASS INDEX: 28.32 KG/M2 | HEIGHT: 65 IN | OXYGEN SATURATION: 97 %

## 2021-12-16 DIAGNOSIS — J45.40 MODERATE PERSISTENT ASTHMA WITHOUT COMPLICATION: Primary | ICD-10-CM

## 2021-12-16 DIAGNOSIS — J30.9 CHRONIC ALLERGIC RHINITIS: ICD-10-CM

## 2021-12-16 PROCEDURE — 99214 OFFICE O/P EST MOD 30 MIN: CPT | Performed by: INTERNAL MEDICINE

## 2021-12-16 RX ORDER — FEXOFENADINE HCL 180 MG/1
180 TABLET ORAL DAILY
Qty: 30 TABLET | Refills: 11 | Status: SHIPPED | OUTPATIENT
Start: 2021-12-16 | End: 2022-12-29 | Stop reason: SDUPTHER

## 2021-12-16 NOTE — PROGRESS NOTES
Pulmonary Office Follow-up    Subjective     Genna Garcia is seen today at the office for   Chief Complaint   Patient presents with   • Cough         HPI  Genna Garcia is a 66 y.o. female with a PMH significant for bronciectasis.     12/16/21  Patient here for follow up. She is doing pretty well from a breathing standpoint. Has episodes of getting more winded. She notices it mostly when the weather is bad. She's still doing her Breo daily in the afternoon which helps. Her asthma seems well controlled. She does feel like her allergies are worse though. She used to get allergy shots and had to stop when she broke her arm. She would like to see a new allergist about getting back on IT schedule  She saw GI and had an esophageal dilation again since last visit. She is eating better. However she still has coughing with eating    9/24/21  Patient here for follow up. At last visit I ordered her Symbicort but her insurance wouldn't cover it. She apparently got generic low dose Symbicort though, and this gave her severe headaches. Went back to Breo but taking it later in the day and that worked better  She continues to have coughing when she eats. She didn't discuss this with her GI doc at last appointment but she is going to see him again    Last OV 7/1/21  Last seen by Dr Evans in Feb  Patient with multiple overlapping issues going on. Essentially she has chronic productive cough from several sources. She has reflux and symptoms of aspiration (coughing with/after eating), and the lower lobe fibrosis on her Ct scan supports this. I can't find a swallow study anywhere. She has esophageal strictures that she has to have stretched periodically   She has allergies, previously on allergy shots but can't afford them now. She has seen ENT before, was going to have sinus surgery but held off for other medical reasons and now her ENT doctor has left. She has run out of her nasal sprays and Allegra.  She  is on Breo and that helps for most of the morning but by late afternoon she is having wheezing and coughing again. She uses Duonebs and Combivent as needed    Tobacco use history:  Type: cigarettes  Amount: 2-3 ppd  Duration: 18 years  Cessation: 2002   Willing to quit: N/A      Patient Active Problem List   Diagnosis   • Scoliosis of thoracolumbar spine   • Other dysphagia   • Pain in joint of right shoulder   • Change in bowel habits   • Chronic allergic rhinitis   • Severe allergic reaction with respiratory distress         Medications, Allergies, Social, and Family Histories reviewed as per EMR.    Objective     Vitals:    12/16/21 1251   BP: 124/82   Pulse: 62   Temp: 97.1 °F (36.2 °C)   SpO2: 97%         12/16/21  1251   Weight: 77.1 kg (170 lb)     [unfilled]  Physical Exam  Vitals reviewed.   Constitutional:       Appearance: Normal appearance.   HENT:      Head: Normocephalic and atraumatic.      Nose: Nose normal.      Mouth/Throat:      Mouth: Mucous membranes are moist.      Pharynx: Oropharynx is clear.   Eyes:      Conjunctiva/sclera: Conjunctivae normal.      Pupils: Pupils are equal, round, and reactive to light.   Cardiovascular:      Rate and Rhythm: Normal rate and regular rhythm.      Pulses: Normal pulses.      Heart sounds: Normal heart sounds.   Pulmonary:      Effort: Pulmonary effort is normal.      Breath sounds: Normal breath sounds.   Abdominal:      General: Abdomen is flat. Bowel sounds are normal.      Palpations: Abdomen is soft.   Musculoskeletal:         General: Normal range of motion.      Cervical back: Normal range of motion.   Skin:     General: Skin is warm and dry.   Neurological:      General: No focal deficit present.      Mental Status: She is alert and oriented to person, place, and time.   Psychiatric:         Mood and Affect: Mood normal.         Behavior: Behavior normal.               PFTs: 10/21/20 (independently reviewed and interpreted by me)  Ratio 78  FVC  2.22/ 69%  FEV1 1.73/ 69%  TLC 2.77/ 53%  DLCO 15.66/ 61%  Variable effort.  Mild restriction. Mildly reduced diffusing capacity. No comparative data available.     Radiology (independently reviewed and interpreted by me): CT chest without contrast 5/20/2020 showed small focus of nodularity posterior right upper lobe, lower lobe predominant interstitial opacity with air bronchograms and traction bronchiectasis, numerous calcified granulomas bilaterally, mildly enlarged mediastinal lymph nodes largest precarinal measuring 1.1 x 1.55 cm, small axillary and retropectoral lymph nodes largest measuring 8.5 mm, findings similar to 4/2/2016. Notable thoracic/lumbar scoliosis       Assessment/Plan     Diagnoses and all orders for this visit:    1. Moderate persistent asthma without complication (Primary)    2. Chronic allergic rhinitis  -     fexofenadine (Allegra Allergy) 180 MG tablet; Take 1 tablet by mouth Daily.  Dispense: 30 tablet; Refill: 11  -     Ambulatory Referral to Allergy         Discussion/ Recommendations:   Patient can continue on Breo and prn albuterol. Do not think that her cough is asthma related.   She would like a referral to allergy for IT and I will place that today.   She should continue ot see GI periodically. Offered to refer to ENT if she wants her sinus situation reevaluated. She declines for now.  Overall doing well      Return in about 6 months (around 6/16/2022).          This document has been electronically signed by Mariah De Jesus DO on December 16, 2021 13:21 CST

## 2022-01-17 ENCOUNTER — PRIOR AUTHORIZATION (OUTPATIENT)
Dept: PULMONOLOGY | Facility: CLINIC | Age: 67
End: 2022-01-17

## 2022-01-17 ENCOUNTER — OFFICE VISIT (OUTPATIENT)
Dept: FAMILY MEDICINE CLINIC | Facility: CLINIC | Age: 67
End: 2022-01-17

## 2022-01-17 VITALS
WEIGHT: 164.2 LBS | SYSTOLIC BLOOD PRESSURE: 110 MMHG | RESPIRATION RATE: 20 BRPM | HEIGHT: 66 IN | DIASTOLIC BLOOD PRESSURE: 84 MMHG | BODY MASS INDEX: 26.39 KG/M2 | HEART RATE: 85 BPM | TEMPERATURE: 97.4 F | OXYGEN SATURATION: 99 %

## 2022-01-17 DIAGNOSIS — R05.9 COUGH: Primary | ICD-10-CM

## 2022-01-17 DIAGNOSIS — Z13.820 OSTEOPOROSIS SCREENING: ICD-10-CM

## 2022-01-17 DIAGNOSIS — Z78.0 POSTMENOPAUSE: ICD-10-CM

## 2022-01-17 DIAGNOSIS — Z79.899 ENCOUNTER FOR LONG-TERM CURRENT USE OF MEDICATION: ICD-10-CM

## 2022-01-17 DIAGNOSIS — J30.9 CHRONIC ALLERGIC RHINITIS: ICD-10-CM

## 2022-01-17 DIAGNOSIS — Z13.6 SCREENING FOR CARDIOVASCULAR CONDITION: ICD-10-CM

## 2022-01-17 DIAGNOSIS — E55.9 VITAMIN D DEFICIENCY: ICD-10-CM

## 2022-01-17 DIAGNOSIS — Z13.220 SCREENING, LIPID: ICD-10-CM

## 2022-01-17 DIAGNOSIS — Z13.29 SCREENING FOR THYROID DISORDER: ICD-10-CM

## 2022-01-17 PROCEDURE — 99214 OFFICE O/P EST MOD 30 MIN: CPT | Performed by: NURSE PRACTITIONER

## 2022-01-17 NOTE — PROGRESS NOTES
"Chief Complaint  Allergies    Subjective    History of Present Illness {CC  Problem List  Visit  Diagnosis   Encounters  Notes  Medications  Labs  Result Review Imaging  Media :23}     Genna Garcia presents to [facility] [place] for   F/u on chronic allergies and is requesting referral to allergist - has hx of severe allergic rxn - has Epi-Pen to use PRN. COPD controlled and managed by pulmonology. Hx of dysphagia that she sees Dr. Rondon PRN - has had esophagus stretched at times. - cont mgmt  As scheduled by pulmonology - cont  Albuterol, Breztri, Combivent, Atrovent as directed. Reports symptoms of \"dropped bladder\" that we discussed at last visit with potential of GYN referral but pt states sx have since improved and would like to hold on referral at this time. Hx of total hysterectomy - PAP no longer needed. Does not want mammogram but wants DEXA. Reports having COVID recently - just finished 2 week quarantine - reports entire household was positive - still has cough that is different than her normal cough - denies SOA - did have 101 temp and loss of taste/smell that has improved.        Objective     Physical Exam  Vitals and nursing note reviewed.   Constitutional:       General: She is not in acute distress.     Appearance: Normal appearance. She is normal weight.   HENT:      Head: Normocephalic and atraumatic.      Right Ear: Tympanic membrane, ear canal and external ear normal.      Left Ear: Tympanic membrane, ear canal and external ear normal.      Nose: Nose normal. No congestion or rhinorrhea.      Mouth/Throat:      Mouth: Mucous membranes are moist.      Pharynx: Oropharynx is clear.   Eyes:      Conjunctiva/sclera: Conjunctivae normal.      Pupils: Pupils are equal, round, and reactive to light.   Neck:      Vascular: No carotid bruit.   Cardiovascular:      Rate and Rhythm: Normal rate and regular rhythm.      Pulses: Normal " "pulses.      Heart sounds: Normal heart sounds. No murmur heard.      Pulmonary:      Effort: Pulmonary effort is normal.      Breath sounds: Decreased air movement present. Examination of the right-lower field reveals decreased breath sounds. Examination of the left-lower field reveals decreased breath sounds. Decreased breath sounds present. No wheezing or rhonchi.   Musculoskeletal:         General: Normal range of motion.      Cervical back: Normal range of motion and neck supple. No muscular tenderness.   Lymphadenopathy:      Cervical: No cervical adenopathy.   Skin:     General: Skin is warm and dry.   Neurological:      General: No focal deficit present.      Mental Status: She is alert and oriented to person, place, and time.   Psychiatric:         Mood and Affect: Mood normal.         Behavior: Behavior normal.        Result Review  Data Reviewed:{ Labs  Result Review  Imaging  Med Tab  Media :23}   The following data was reviewed by (Optional):[id number]}    XR Chest PA & Lateral    Result Date: 1/17/2022  1. Chronic changes as above without radiographic evidence of acute superimposed cardiopulmonary disease. Electronically signed by:  Chary Sr MD  1/17/2022 2:55 PM CST Workstation: [id number]       Vital Signs:   /84 (BP Location: Left arm, Patient Position: Sitting, Cuff Size: Adult)   Pulse 85   Temp 97.4 °F (36.3 °C) (Temporal)   Resp 20   Ht 166.4 cm (65.5\")   Wt 74.5 kg (164 lb 3.2 oz)   SpO2 99%   BMI 26.91 kg/m²          Assessment and Plan {CC Problem List  Visit Diagnosis  ROS  Review (Popup)  Health Maintenance  Quality  BestPractice  Medications  SmartSets  SnapShot Encounters  Media :23}   Problem List Items Addressed This Visit        Allergies and Adverse Reactions    Chronic allergic rhinitis    Relevant Orders    Ambulatory Referral to Allergy (Completed)      Other Visit Diagnoses     Cough    -  Primary    Relevant Orders    XR Chest PA & Lateral (Completed) "    Postmenopause        Relevant Orders    DEXA Bone Density Axial    Osteoporosis screening        Relevant Orders    DEXA Bone Density Axial    Screening for cardiovascular condition        Relevant Orders    Comprehensive metabolic panel    Screening, lipid        Relevant Orders    Lipid panel    Screening for thyroid disorder        Relevant Orders    TSH    T4, free    Vitamin D deficiency        Relevant Orders    Vitamin D 25 hydroxy    Encounter for long-term current use of medication        Relevant Orders    CBC No Differential    Hemoglobin A1c         Diagnosis Plan   1. Cough  XR Chest PA & Lateral   2. Chronic allergic rhinitis  Ambulatory Referral to Allergy   3. Postmenopause  DEXA Bone Density Axial   4. Osteoporosis screening  DEXA Bone Density Axial   5. Screening for cardiovascular condition  Comprehensive metabolic panel   6. Screening, lipid  Lipid panel   7. Screening for thyroid disorder  TSH    T4, free   8. Vitamin D deficiency  Vitamin D 25 hydroxy   9. Encounter for long-term current use of medication  CBC No Differential    Hemoglobin A1c     - Cough - CXR ordered for further evaluation - will eval and tx pending result - continue pulmonology appts for mgmt of chronic lung issues; cont albuterol, Breo Ellipta, Duo Neb as prescribed.  - Chronic allergies - continue fexofenadine, fluticasone nasal, Patanase as prescribed - referral submitted to allergist for further evaluation  - DEXA scan ordered  - Declined mammogram  - Screening labs ordered - will return fasting to obtain.   - Due AWV at earliest convenience  - RTC 6 mos for f/u or PRN    Follow Up {Instructions Charge Capture  Follow-up Communications :23}   Return in about 6 months (around 7/17/2022), or if symptoms worsen or fail to improve, for Recheck.  Patient was given instructions and counseling regarding her condition or for health maintenance advice. Please see specific information pulled into the AVS if  appropriate            .  This document has been electronically signed by MARCELLA Haines on January 17, 2022 20:47 CST

## 2022-01-18 ENCOUNTER — LAB (OUTPATIENT)
Dept: LAB | Facility: HOSPITAL | Age: 67
End: 2022-01-18

## 2022-01-18 DIAGNOSIS — Z13.6 SCREENING FOR CARDIOVASCULAR CONDITION: ICD-10-CM

## 2022-01-18 DIAGNOSIS — Z79.899 ENCOUNTER FOR LONG-TERM CURRENT USE OF MEDICATION: ICD-10-CM

## 2022-01-18 DIAGNOSIS — Z13.29 SCREENING FOR THYROID DISORDER: ICD-10-CM

## 2022-01-18 DIAGNOSIS — E55.9 VITAMIN D DEFICIENCY: ICD-10-CM

## 2022-01-18 DIAGNOSIS — Z13.220 SCREENING, LIPID: ICD-10-CM

## 2022-01-18 LAB
25(OH)D3 SERPL-MCNC: 26.9 NG/ML
ALBUMIN SERPL-MCNC: 4.2 G/DL (ref 3.5–5.2)
ALBUMIN/GLOB SERPL: 1.3 G/DL
ALP SERPL-CCNC: 114 U/L (ref 39–117)
ALT SERPL W P-5'-P-CCNC: 13 U/L (ref 1–33)
ANION GAP SERPL CALCULATED.3IONS-SCNC: 9 MMOL/L (ref 5–15)
AST SERPL-CCNC: 22 U/L (ref 1–32)
BILIRUB SERPL-MCNC: 0.4 MG/DL (ref 0–1.2)
BUN SERPL-MCNC: 10 MG/DL (ref 8–23)
BUN/CREAT SERPL: 13 (ref 7–25)
CALCIUM SPEC-SCNC: 9.5 MG/DL (ref 8.6–10.5)
CHLORIDE SERPL-SCNC: 101 MMOL/L (ref 98–107)
CHOLEST SERPL-MCNC: 217 MG/DL (ref 0–200)
CO2 SERPL-SCNC: 30 MMOL/L (ref 22–29)
CREAT SERPL-MCNC: 0.77 MG/DL (ref 0.57–1)
DEPRECATED RDW RBC AUTO: 40 FL (ref 37–54)
ERYTHROCYTE [DISTWIDTH] IN BLOOD BY AUTOMATED COUNT: 13.6 % (ref 12.3–15.4)
GFR SERPL CREATININE-BSD FRML MDRD: 75 ML/MIN/1.73
GLOBULIN UR ELPH-MCNC: 3.3 GM/DL
GLUCOSE SERPL-MCNC: 89 MG/DL (ref 65–99)
HBA1C MFR BLD: 6.07 % (ref 4.8–5.6)
HCT VFR BLD AUTO: 43.2 % (ref 34–46.6)
HDLC SERPL-MCNC: 41 MG/DL (ref 40–60)
HGB BLD-MCNC: 14.3 G/DL (ref 12–15.9)
LDLC SERPL CALC-MCNC: 152 MG/DL (ref 0–100)
LDLC/HDLC SERPL: 3.64 {RATIO}
MCH RBC QN AUTO: 27.3 PG (ref 26.6–33)
MCHC RBC AUTO-ENTMCNC: 33.1 G/DL (ref 31.5–35.7)
MCV RBC AUTO: 82.6 FL (ref 79–97)
PLATELET # BLD AUTO: 302 10*3/MM3 (ref 140–450)
PMV BLD AUTO: 11 FL (ref 6–12)
POTASSIUM SERPL-SCNC: 4 MMOL/L (ref 3.5–5.2)
PROT SERPL-MCNC: 7.5 G/DL (ref 6–8.5)
RBC # BLD AUTO: 5.23 10*6/MM3 (ref 3.77–5.28)
SODIUM SERPL-SCNC: 140 MMOL/L (ref 136–145)
T4 FREE SERPL-MCNC: 1.39 NG/DL (ref 0.93–1.7)
TRIGL SERPL-MCNC: 134 MG/DL (ref 0–150)
TSH SERPL DL<=0.05 MIU/L-ACNC: 3.15 UIU/ML (ref 0.27–4.2)
VLDLC SERPL-MCNC: 24 MG/DL (ref 5–40)
WBC NRBC COR # BLD: 8.3 10*3/MM3 (ref 3.4–10.8)

## 2022-01-18 PROCEDURE — 80053 COMPREHEN METABOLIC PANEL: CPT

## 2022-01-18 PROCEDURE — 80061 LIPID PANEL: CPT

## 2022-01-18 PROCEDURE — 83036 HEMOGLOBIN GLYCOSYLATED A1C: CPT

## 2022-01-18 PROCEDURE — 84443 ASSAY THYROID STIM HORMONE: CPT

## 2022-01-18 PROCEDURE — 82306 VITAMIN D 25 HYDROXY: CPT

## 2022-01-18 PROCEDURE — 84439 ASSAY OF FREE THYROXINE: CPT

## 2022-01-18 PROCEDURE — 85027 COMPLETE CBC AUTOMATED: CPT

## 2022-01-19 ENCOUNTER — TELEPHONE (OUTPATIENT)
Dept: FAMILY MEDICINE CLINIC | Facility: CLINIC | Age: 67
End: 2022-01-19

## 2022-01-19 NOTE — TELEPHONE ENCOUNTER
Called patient back after she left a voicemail. No answer. Left voicemail for the patient to give us a call back.

## 2022-01-20 NOTE — TELEPHONE ENCOUNTER
Left voicemail letting patient know that I do not have her xray results but would call her as soon as I receive them.

## 2022-02-22 ENCOUNTER — TELEPHONE (OUTPATIENT)
Dept: PULMONOLOGY | Facility: CLINIC | Age: 67
End: 2022-02-22

## 2022-02-22 NOTE — TELEPHONE ENCOUNTER
After speaking to Dr. De Jesus, patient notified that her Patanase nasal spray was refilled by Dr. De Jesus as a courtesy and that she should contact her Allergist or an ENT for a follow up and a refill.  She verbalized understanding and was agreeable.        ----- Message from Mary Starr sent at 2/21/2022 12:36 PM CST -----  Contact: [phone]  Patient requesting a call back. Message left on voicemail, no details given.

## 2022-02-24 ENCOUNTER — TELEPHONE (OUTPATIENT)
Dept: FAMILY MEDICINE CLINIC | Facility: CLINIC | Age: 67
End: 2022-02-24

## 2022-02-24 DIAGNOSIS — M81.0 OSTEOPOROSIS OF LUMBAR SPINE: Primary | ICD-10-CM

## 2022-02-24 DIAGNOSIS — M85.852 OSTEOPENIA OF LEFT HIP: ICD-10-CM

## 2022-02-25 DIAGNOSIS — Z13.820 OSTEOPOROSIS SCREENING: ICD-10-CM

## 2022-02-25 DIAGNOSIS — Z78.0 POSTMENOPAUSE: ICD-10-CM

## 2022-03-07 ENCOUNTER — OFFICE VISIT (OUTPATIENT)
Dept: ORTHOPEDIC SURGERY | Facility: CLINIC | Age: 67
End: 2022-03-07

## 2022-03-07 VITALS — WEIGHT: 163 LBS | HEIGHT: 66 IN | BODY MASS INDEX: 26.2 KG/M2

## 2022-03-07 DIAGNOSIS — E28.319 EARLY MENOPAUSE OCCURRING IN PATIENT AGE YOUNGER THAN 45 YEARS: ICD-10-CM

## 2022-03-07 DIAGNOSIS — Z78.0 POSTMENOPAUSAL: ICD-10-CM

## 2022-03-07 DIAGNOSIS — Z87.891 HISTORY OF TOBACCO ABUSE: ICD-10-CM

## 2022-03-07 DIAGNOSIS — Z87.19 HISTORY OF ESOPHAGEAL STRICTURE: ICD-10-CM

## 2022-03-07 DIAGNOSIS — J44.9 CHRONIC OBSTRUCTIVE PULMONARY DISEASE, UNSPECIFIED COPD TYPE: ICD-10-CM

## 2022-03-07 DIAGNOSIS — M81.0 AGE-RELATED OSTEOPOROSIS WITHOUT CURRENT PATHOLOGICAL FRACTURE: Primary | ICD-10-CM

## 2022-03-07 DIAGNOSIS — R13.10 DYSPHAGIA, UNSPECIFIED TYPE: ICD-10-CM

## 2022-03-07 DIAGNOSIS — Z98.890 HISTORY OF ESOPHAGEAL DILATATION: ICD-10-CM

## 2022-03-07 DIAGNOSIS — K21.9 GASTROESOPHAGEAL REFLUX DISEASE WITHOUT ESOPHAGITIS: ICD-10-CM

## 2022-03-07 DIAGNOSIS — K58.9 IRRITABLE BOWEL SYNDROME, UNSPECIFIED TYPE: ICD-10-CM

## 2022-03-07 PROCEDURE — 99214 OFFICE O/P EST MOD 30 MIN: CPT | Performed by: NURSE PRACTITIONER

## 2022-03-07 NOTE — PROGRESS NOTES
Genna Garcia is a 67 y.o. female returns for     Chief Complaint   Patient presents with   • Osteoporosis       HISTORY OF PRESENT ILLNESS: 67-year-old  female patient presents [facility] for bone health evaluation and treatment of osteoporosis.    Last Bone Density Study: 2/23/2022    Referred by: MARCELLA Gómez (primary care)    History of Osteoporosis or Osteopenia: History of osteoporosis.  It is unclear as to when she was diagnosed with osteoporosis but it was noted on her 2018 DEXA scan.  Prior Treatments for Osteoporosis: Fosamax, Actonel.  Patient did not tolerate either one of these medications and developed severe myalgias/muscle pain.  No current treatment for osteoporosis.    Decrease in Height: Yes    Fracture History: History of a right distal radius fracture requiring ORIF    High Risk Medications:   Current: Protonix   Previous: Protonix, prednisone, Lasix; No long-term use of systemic steroids greater than 3 months reported.    Comorbid Medical Conditions that Increase Risk for Osteoporosis: Irritable bowel syndrome, COPD, vitamin D deficiency, history of tobacco use/abuse    Postmenopausal status: Postmenopausal due to total hysterectomy in 1984  Age of Menopause/Hysterectomy: Age [age]  Hormone Replacement Therapy: Yes, patient took HRT for approximately 1 year.  She states she had to stop HRT due to cost.    Family History of Osteoporosis: Yes, mother had osteoporosis.    History of Smoking: Patient is a former smoker who quit smoking in 2002.  Patient has a history of smoking 2.5 packs/day for approximately 20 years.    Taking Calcium supplements: Yes, taking calcium supplementation of 1200 mg daily.  Taking Vitamin D supplements: Yes, taking 5000 units of vitamin D-2 tablets daily for total of 10,000 units.  Patient had a recent vitamin D level checked on 1/18/2022 and had persistent vitamin D deficiency of 26.9.  She has been taking her current dose of  "vitamin D for approximately 1 year.       CONCURRENT MEDICAL HISTORY:    The following portions of the patient's history were reviewed and updated as appropriate: allergies, current medications, past family history, past medical history, past social history, past surgical history and problem list.     ROS  No fevers or chills.  No chest pain or shortness of air.  No GI or  disturbances.    PHYSICAL EXAMINATION:       Ht 166.4 cm (65.5\")   Wt 73.9 kg (163 lb)   BMI 26.71 kg/m²     Physical Exam  Vitals reviewed.   Constitutional:       General: She is not in acute distress.     Appearance: She is well-developed. She is not ill-appearing.   HENT:      Head: Normocephalic.   Pulmonary:      Effort: Pulmonary effort is normal. No respiratory distress.   Abdominal:      General: There is no distension.      Palpations: Abdomen is soft.   Musculoskeletal:         General: No swelling, tenderness, deformity or signs of injury.   Skin:     General: Skin is warm and dry.      Capillary Refill: Capillary refill takes less than 2 seconds.      Findings: No erythema.   Neurological:      Mental Status: She is alert and oriented to person, place, and time.      GCS: GCS eye subscore is 4. GCS verbal subscore is 5. GCS motor subscore is 6.   Psychiatric:         Speech: Speech normal.         Behavior: Behavior normal.         Thought Content: Thought content normal.         Judgment: Judgment normal.         GAIT:     [x]  Normal  []  Antalgic    Assistive device: [x]  None  []  Walker     []  Crutches  []  Cane     []  Wheelchair  []  Stretcher    Back Exam     Other   Gait: normal     Comments:  No kyphosis or obvious deformity noted.  No focal neurological deficits noted.                     ASSESSMENT:    Diagnoses and all orders for this visit:    Age-related osteoporosis without current pathological fracture    Gastroesophageal reflux disease without esophagitis    History of esophageal stricture    History of " esophageal dilatation    Dysphagia, unspecified type    History of tobacco abuse    Postmenopausal    Early menopause occurring in patient age younger than 45 years    Irritable bowel syndrome, unspecified type    Chronic obstructive pulmonary disease, unspecified COPD type ([facility])    PLAN    Bone density study results from 2/23/2022 are reviewed and discussed with patient today.  We discussed that she has a T score of -3.8 in the lumbar spine, indicative of advanced osteoporosis.  We discussed that she has a T score of -2.4 in the left hip, indicative of advanced osteopenia, nearing osteoporosis.  We discussed that she is high risk for fracture due to osteoporosis.  She is not currently being treated for osteoporosis and I have recommended that we start treatment in an effort to prevent worsening osteoporosis, prevent further bone loss and reduce her risk for fracture.  We discussed her individualized risk factors for osteoporosis including her age, race, gender, postmenopausal status, markedly early menopause due to a full hysterectomy at the age of 26-27 with only 1 year of hormone replacement therapy.  Additionally, patient has positive family history in her mother and comorbid medical conditions including COPD and IBS.  The patient is also a former heavy tobacco user of 2.5 packs/day for approximately 20 years.  However, she did successfully quit smoking in 2002.  The patient does have a history of a previous right distal radius fracture requiring ORIF, which we discussed would be considered a fragility fracture as she fell from a standing height.    Osteoporosis is not a new diagnosis for this patient.  I only have 1 prior DEXA scan available for me to review and she had evidence of osteoporosis on that scan in 2018.  The patient has tried taking oral medications in the past (Fosamax and Actonel) but she did not tolerate them and developed severe myalgias so they had to be discontinued.  She also reports that  they did exacerbate her chronic reflux.  The patient has a significant history of reflux, requiring long-term use of proton pump inhibitors, dysphagia and esophageal strictures requiring dilatation in the past.  Based on the fact that she has tried and failed oral bisphosphonates and based on her significant history as described, I would not recommend trial of any further oral bisphosphonates.  We discussed other treatment options for osteoporosis including Prolia, Evenity, Tymlos and Forteo.  I have recommended that she start Prolia injections for treatment of osteoporosis.  We discussed risk versus benefits of Prolia.  We discussed potential adverse side effects of Prolia.  Written literature regarding Prolia is provided to the patient today.  The patient understands that she will receive a telephone call to schedule her initial injection at the [facility], pending insurance approval.    We discussed the importance of proper nutrition and adequate intake of calcium and vitamin D for optimal bone health and prevention of worsening osteoporosis. Recommend to continue with her calcium and vitamin D supplementation daily.  She is currently taking calcium 1200 mg total daily and vitamin D 10,000 units total daily.  She has been taking her current dose of vitamin D for the past year she had a recent vitamin D level checked in January 2022 and her level is still slightly deficient at 26.9 so I recommended that she continue with her current dosing.  We also discussed the importance of regular exercise, especially weightbearing exercise such as walking, for optimal bone health and prevention of worsening osteoporosis. An educational packet regarding osteoporosis is provided to the patient today.  Patient is a former smoker who quit smoking in 2002.  We discussed that smoking can worsen osteoporosis and I encouraged her to maintain her non-smoking status.    Recommend a repeat bone density study in 1 year and follow-up  with [facility].    Time spent of a minimum of 30 minutes including the face to face evaluation, reviewing of medical history and prior medial records, reviewing of diagnostic studies, ordering additional tests, prescription drug management, documentation, patient education and coordination of care.     EMR Dragon/Transciption Disclaimer: Some of this note may be an electronic transcription/translation of spoken language to printed text.  The electronic translation of spoken language may permit erroneous, or at times, nonsensical words or phrases to be inadvertently transcribed. Although I have reviewed the note for such errors, some may still exist.     Return in about 1 year (around 3/7/2023) for Recheck.        This document has been electronically signed by MARCELLA Potter on March 7, 2022 12:01 CST      MARCELLA Potter

## 2022-03-17 DIAGNOSIS — M81.0 SENILE OSTEOPOROSIS: Primary | ICD-10-CM

## 2022-03-28 ENCOUNTER — TELEPHONE (OUTPATIENT)
Dept: FAMILY MEDICINE CLINIC | Facility: CLINIC | Age: 67
End: 2022-03-28

## 2022-03-28 ENCOUNTER — LAB (OUTPATIENT)
Dept: LAB | Facility: HOSPITAL | Age: 67
End: 2022-03-28

## 2022-03-28 DIAGNOSIS — M81.0 SENILE OSTEOPOROSIS: ICD-10-CM

## 2022-03-28 LAB
ALBUMIN SERPL-MCNC: 4 G/DL (ref 3.5–5.2)
ALBUMIN/GLOB SERPL: 1.1 G/DL
ALP SERPL-CCNC: 195 U/L (ref 39–117)
ALT SERPL W P-5'-P-CCNC: 76 U/L (ref 1–33)
ANION GAP SERPL CALCULATED.3IONS-SCNC: 10 MMOL/L (ref 5–15)
AST SERPL-CCNC: 51 U/L (ref 1–32)
BILIRUB SERPL-MCNC: 0.4 MG/DL (ref 0–1.2)
BUN SERPL-MCNC: 9 MG/DL (ref 8–23)
BUN/CREAT SERPL: 11.5 (ref 7–25)
CALCIUM SPEC-SCNC: 8.9 MG/DL (ref 8.6–10.5)
CHLORIDE SERPL-SCNC: 102 MMOL/L (ref 98–107)
CO2 SERPL-SCNC: 29 MMOL/L (ref 22–29)
CREAT SERPL-MCNC: 0.78 MG/DL (ref 0.57–1)
EGFRCR SERPLBLD CKD-EPI 2021: 84.4 ML/MIN/1.73
GLOBULIN UR ELPH-MCNC: 3.6 GM/DL
GLUCOSE SERPL-MCNC: 88 MG/DL (ref 65–99)
POTASSIUM SERPL-SCNC: 4.3 MMOL/L (ref 3.5–5.2)
PROT SERPL-MCNC: 7.6 G/DL (ref 6–8.5)
SODIUM SERPL-SCNC: 141 MMOL/L (ref 136–145)

## 2022-03-28 PROCEDURE — 80053 COMPREHEN METABOLIC PANEL: CPT

## 2022-03-28 NOTE — TELEPHONE ENCOUNTER
Left message to call office and reschedule appointment as provider is out of office for the rest of the day.

## 2022-03-30 ENCOUNTER — OFFICE VISIT (OUTPATIENT)
Dept: FAMILY MEDICINE CLINIC | Facility: CLINIC | Age: 67
End: 2022-03-30

## 2022-03-30 VITALS
WEIGHT: 165.2 LBS | DIASTOLIC BLOOD PRESSURE: 68 MMHG | OXYGEN SATURATION: 97 % | SYSTOLIC BLOOD PRESSURE: 98 MMHG | TEMPERATURE: 98.9 F | HEART RATE: 97 BPM | BODY MASS INDEX: 26.55 KG/M2 | HEIGHT: 66 IN

## 2022-03-30 DIAGNOSIS — R51.9 NEW ONSET OF HEADACHES AFTER AGE 50: Primary | ICD-10-CM

## 2022-03-30 DIAGNOSIS — R42 DIZZINESS: ICD-10-CM

## 2022-03-30 PROCEDURE — 99213 OFFICE O/P EST LOW 20 MIN: CPT | Performed by: NURSE PRACTITIONER

## 2022-03-30 RX ORDER — MONTELUKAST SODIUM 10 MG/1
10 TABLET ORAL DAILY
COMMUNITY
Start: 2022-03-01

## 2022-03-30 NOTE — PROGRESS NOTES
Chief Complaint  Headache    Subjective    History of Present Illness {CC  Problem List  Visit  Diagnosis   Encounters  Notes  Medications  Labs  Result Review Imaging  Media :23}     Genna Garcia presents to [facility] [place] for   C/o HA that happened last Thursday that was accompanied with nausea and diarrhea - during episode she felt lightheaded and weak and pressure in that radiated up into her head. Reports starting new medication, montelukast, and nasal spray was changed from olopatadine to azelastine - reports never having sx until after taking these medications. Has seen Dr. De La Rosa and was who prescribed the new nasal spray - sees Dr. De La Rosa again next week. Denies LOC or speech problem.    Dizziness  This is a new problem. The current episode started in the past 7 days. The problem occurs intermittently. Associated symptoms include headaches, nausea and weakness. Pertinent negatives include no abdominal pain, change in bowel habit, chest pain, coughing, diaphoresis, fever, urinary symptoms, visual change or vomiting. Nothing aggravates the symptoms. She has tried nothing for the symptoms.        Objective     Physical Exam  Vitals and nursing note reviewed.   Constitutional:       General: She is not in acute distress.     Appearance: Normal appearance. She is normal weight. She is not ill-appearing.   HENT:      Head: Normocephalic and atraumatic.      Right Ear: Tympanic membrane, ear canal and external ear normal.      Left Ear: Tympanic membrane, ear canal and external ear normal.      Nose: Nose normal.      Mouth/Throat:      Mouth: Mucous membranes are moist.      Pharynx: Oropharynx is clear. No posterior oropharyngeal erythema.   Eyes:      Extraocular Movements: Extraocular movements intact.      Conjunctiva/sclera: Conjunctivae normal.      Pupils: Pupils are equal, round, and reactive to light.   Neck:      Vascular: No  "carotid bruit.   Cardiovascular:      Rate and Rhythm: Normal rate and regular rhythm.      Heart sounds: Normal heart sounds. No murmur heard.  Pulmonary:      Effort: Pulmonary effort is normal.      Breath sounds: Normal breath sounds. No wheezing or rhonchi.   Musculoskeletal:         General: Normal range of motion.      Cervical back: Normal range of motion and neck supple.   Lymphadenopathy:      Cervical: No cervical adenopathy.   Skin:     General: Skin is warm and dry.   Neurological:      General: No focal deficit present.      Mental Status: She is alert and oriented to person, place, and time.   Psychiatric:         Mood and Affect: Mood normal.         Behavior: Behavior normal.        Result Review  Data Reviewed:{ Labs  Result Review  Imaging  Med Tab  Media :23}   The following data was reviewed by (Optional):[id number]}  Common labs    Common Labsle 1/18/22 1/18/22 1/18/22 1/18/22 3/28/22    0937 0937 0937 0937    Glucose   89  88   BUN   10  9   Creatinine   0.77  0.78   eGFR Non African Am   75     Sodium   140  141   Potassium   4.0  4.3   Chloride   101  102   Calcium   9.5  8.9   Albumin   4.20  4.00   Total Bilirubin   0.4  0.4   Alkaline Phosphatase   114  195 (A)   AST (SGOT)   22  51 (A)   ALT (SGPT)   13  76 (A)   WBC 8.30       Hemoglobin 14.3       Hematocrit 43.2       Platelets 302       Total Cholesterol  217 (A)      Triglycerides  134      HDL Cholesterol  41      LDL Cholesterol   152 (A)      Hemoglobin A1C    6.07 (A)    (A) Abnormal value          XR Chest PA & Lateral    Result Date: 1/17/2022  1. Chronic changes as above without radiographic evidence of acute superimposed cardiopulmonary disease. Electronically signed by:  Chary Sr MD  1/17/2022 2:55 PM CST Workstation: [id number]       Vital Signs:   BP 98/68 (BP Location: Right arm, Patient Position: Sitting, Cuff Size: Large Adult)   Pulse 97   Temp 98.9 °F (37.2 °C)   Ht 166.4 cm (65.5\")   Wt 74.9 kg (165 lb 3.2 oz) "   SpO2 97%   BMI 27.07 kg/m²          Assessment and Plan {CC Problem List  Visit Diagnosis  ROS  Review (Popup)  Health Maintenance  Quality  BestPractice  Medications  SmartSets  SnapShot Encounters  Media :23}   Problem List Items Addressed This Visit    None     Visit Diagnoses     New onset of headaches after age 50    -  Primary    Relevant Orders    MRI Brain Without Contrast    Dizziness        Relevant Orders    MRI Brain Without Contrast         Diagnosis Plan   1. New onset of headaches after age 50  MRI Brain Without Contrast   2. Dizziness  MRI Brain Without Contrast     - New onset HA > age 49 yo with accompanying dizziness - MRI ordered for further evaluation.  - Advised to discuss SEWELL with Dr. De La Rosa at appt next week since sx started after starting azelastine nasal spray and montelukast.  - Continue OTC tylenol PRN pain or RX Tylenol #3 for severe pain.  - Advised to go to ED if sx returned or with worsening accompanying sx - pt v/u.  - RTC PRN or as scheduled.    Follow Up {Instructions Charge Capture  Follow-up Communications :23}   Return if symptoms worsen or fail to improve, for Next scheduled follow up.  Patient was given instructions and counseling regarding her condition or for health maintenance advice. Please see specific information pulled into the AVS if appropriate            .  This document has been electronically signed by MARCELLA Haines on March 30, 2022 21:17 CDT

## 2022-04-04 ENCOUNTER — INFUSION (OUTPATIENT)
Dept: ONCOLOGY | Facility: HOSPITAL | Age: 67
End: 2022-04-04

## 2022-04-04 VITALS
DIASTOLIC BLOOD PRESSURE: 74 MMHG | HEART RATE: 70 BPM | RESPIRATION RATE: 18 BRPM | SYSTOLIC BLOOD PRESSURE: 139 MMHG | TEMPERATURE: 96.6 F

## 2022-04-04 DIAGNOSIS — M81.0 SENILE OSTEOPOROSIS: Primary | ICD-10-CM

## 2022-04-04 PROCEDURE — 25010000002 DENOSUMAB 60 MG/ML SOLUTION PREFILLED SYRINGE: Performed by: NURSE PRACTITIONER

## 2022-04-04 PROCEDURE — 96372 THER/PROPH/DIAG INJ SC/IM: CPT | Performed by: NURSE PRACTITIONER

## 2022-04-04 RX ADMIN — DENOSUMAB 60 MG: 60 INJECTION SUBCUTANEOUS at 13:49

## 2022-04-19 RX ORDER — FAMOTIDINE 20 MG/1
TABLET, FILM COATED ORAL
Qty: 30 TABLET | Refills: 3 | Status: SHIPPED | OUTPATIENT
Start: 2022-04-19 | End: 2022-07-15 | Stop reason: SDUPTHER

## 2022-04-25 NOTE — TELEPHONE ENCOUNTER
----- Message from Zuleyka Aceves sent at 4/25/2022 11:58 AM CDT -----  Contact: [phone]  Would like a refill of Breo  sent to [facility]

## 2022-04-28 ENCOUNTER — TELEPHONE (OUTPATIENT)
Dept: FAMILY MEDICINE CLINIC | Facility: CLINIC | Age: 67
End: 2022-04-28

## 2022-05-03 DIAGNOSIS — R51.9 NEW ONSET OF HEADACHES AFTER AGE 50: ICD-10-CM

## 2022-05-03 DIAGNOSIS — R42 DIZZINESS: ICD-10-CM

## 2022-05-16 ENCOUNTER — OFFICE VISIT (OUTPATIENT)
Dept: FAMILY MEDICINE CLINIC | Facility: CLINIC | Age: 67
End: 2022-05-16

## 2022-05-16 VITALS
DIASTOLIC BLOOD PRESSURE: 78 MMHG | TEMPERATURE: 98.2 F | SYSTOLIC BLOOD PRESSURE: 110 MMHG | HEIGHT: 66 IN | OXYGEN SATURATION: 98 % | WEIGHT: 166.4 LBS | HEART RATE: 84 BPM | BODY MASS INDEX: 26.74 KG/M2

## 2022-05-16 DIAGNOSIS — N39.0 ACUTE UTI: Primary | ICD-10-CM

## 2022-05-16 DIAGNOSIS — R30.9 PAIN WITH URINATION: ICD-10-CM

## 2022-05-16 LAB
BILIRUB BLD-MCNC: NEGATIVE MG/DL
CLARITY, POC: CLEAR
COLOR UR: YELLOW
EXPIRATION DATE: ABNORMAL
GLUCOSE UR STRIP-MCNC: NEGATIVE MG/DL
KETONES UR QL: NEGATIVE
LEUKOCYTE EST, POC: ABNORMAL
Lab: ABNORMAL
NITRITE UR-MCNC: NEGATIVE MG/ML
PH UR: 6.5 [PH] (ref 5–8)
PROT UR STRIP-MCNC: ABNORMAL MG/DL
RBC # UR STRIP: ABNORMAL /UL
SP GR UR: 1.01 (ref 1–1.03)
UROBILINOGEN UR QL: NORMAL

## 2022-05-16 PROCEDURE — 87086 URINE CULTURE/COLONY COUNT: CPT | Performed by: NURSE PRACTITIONER

## 2022-05-16 PROCEDURE — 99213 OFFICE O/P EST LOW 20 MIN: CPT | Performed by: NURSE PRACTITIONER

## 2022-05-16 PROCEDURE — 87186 SC STD MICRODIL/AGAR DIL: CPT | Performed by: NURSE PRACTITIONER

## 2022-05-16 PROCEDURE — 87077 CULTURE AEROBIC IDENTIFY: CPT | Performed by: NURSE PRACTITIONER

## 2022-05-16 PROCEDURE — 81003 URINALYSIS AUTO W/O SCOPE: CPT | Performed by: NURSE PRACTITIONER

## 2022-05-16 RX ORDER — NITROFURANTOIN 25; 75 MG/1; MG/1
100 CAPSULE ORAL 2 TIMES DAILY
Qty: 14 CAPSULE | Refills: 0 | Status: SHIPPED | OUTPATIENT
Start: 2022-05-16 | End: 2022-05-26

## 2022-05-16 RX ORDER — FLUCONAZOLE 150 MG/1
TABLET ORAL
Qty: 2 TABLET | Refills: 0 | Status: SHIPPED | OUTPATIENT
Start: 2022-05-16 | End: 2022-05-26

## 2022-05-16 NOTE — PROGRESS NOTES
"Chief Complaint  Urinary Tract Infection (Sharp pains at the end of urinating.  Started Friday afternoon.  Trouble emptying bladder.)    Subjective          Genna Garcia presents to [facility]    PCP: MARCELLA Boswell    CC: \"possible UTI\"    Hx of UTIs, but no problems in about a year.  Believes she has prolapsed bladder following total hysterectomy, but declines GYN or urology referral for now.  Reports she has GI issues and had some loose stools and believes that is what possibly triggered current UTI.      Urinary Tract Infection   This is a new problem. Episode onset: 5-13-22. The problem has been gradually worsening. The quality of the pain is described as aching and burning (pain at end of urination). The pain is at a severity of 2/10 (LBP). There has been no fever. Associated symptoms include frequency and urgency. Pertinent negatives include no chills, discharge, flank pain, hematuria, hesitancy, nausea, possible pregnancy, sweats or vomiting. Associated symptoms comments: +decreased appetite  . Treatments tried: Azo x 2 days. The treatment provided mild relief. Her past medical history is significant for recurrent UTIs (in the past, but none recent).       Review of Systems   Constitutional: Negative.  Negative for chills.   Respiratory: Positive for cough and shortness of breath. Negative for chest tightness and wheezing.         Hx of pulmonary fibrosis, chronic bronchitis--no changes from normal   Cardiovascular: Negative.    Gastrointestinal: Positive for abdominal pain (suprapubic pressure) and diarrhea (last week). Negative for nausea and vomiting.   Genitourinary: Positive for frequency and urgency. Negative for flank pain, hematuria and hesitancy.   Musculoskeletal: Positive for back pain (chronic low back).   Skin: Negative.    Neurological: Negative for dizziness and headaches.        Objective   Vital " "Signs:   /78 (BP Location: Right arm, Patient Position: Sitting)   Pulse 84   Temp 98.2 °F (36.8 °C)   Ht 166.4 cm (65.5\")   Wt 75.5 kg (166 lb 6.4 oz)   SpO2 98%   BMI 27.27 kg/m²       Physical Exam  Vitals and nursing note reviewed.   Constitutional:       General: She is not in acute distress.     Appearance: Normal appearance. She is not ill-appearing.   HENT:      Head: Normocephalic and atraumatic.   Cardiovascular:      Rate and Rhythm: Normal rate and regular rhythm.   Pulmonary:      Effort: Pulmonary effort is normal. No respiratory distress.      Breath sounds: Normal breath sounds. No wheezing, rhonchi or rales.   Abdominal:      General: Bowel sounds are normal.      Palpations: Abdomen is soft.      Tenderness: There is abdominal tenderness (suprapubic). There is no right CVA tenderness, left CVA tenderness, guarding or rebound.   Musculoskeletal:      Cervical back: Neck supple.   Skin:     General: Skin is warm and dry.   Neurological:      General: No focal deficit present.      Mental Status: She is alert and oriented to person, place, and time.   Psychiatric:         Mood and Affect: Mood normal.         Thought Content: Thought content normal.          Result Review :     Common labs    Common Labsle 1/18/22 1/18/22 1/18/22 1/18/22 3/28/22    0937 0937 0937 0937    Glucose   89  88   BUN   10  9   Creatinine   0.77  0.78   eGFR Non African Am   75     Sodium   140  141   Potassium   4.0  4.3   Chloride   101  102   Calcium   9.5  8.9   Albumin   4.20  4.00   Total Bilirubin   0.4  0.4   Alkaline Phosphatase   114  195 (A)   AST (SGOT)   22  51 (A)   ALT (SGPT)   13  76 (A)   WBC 8.30       Hemoglobin 14.3       Hematocrit 43.2       Platelets 302       Total Cholesterol  217 (A)      Triglycerides  134      HDL Cholesterol  41      LDL Cholesterol   152 (A)      Hemoglobin A1C    6.07 (A)    (A) Abnormal value                   Recent Results (from the past 24 hour(s))   POCT " urinalysis dipstick, automated    Collection Time: 05/16/22 11:34 AM    Specimen: Urine   Result Value Ref Range    Color Yellow Yellow, Straw, Dark Yellow, Celia    Clarity, UA Clear Clear    Specific Gravity  1.015 1.005 - 1.030    pH, Urine 6.5 5.0 - 8.0    Leukocytes Large (3+) (A) Negative    Nitrite, UA Negative Negative    Protein, POC 1+ (A) Negative mg/dL    Glucose, UA Negative Negative, 1000 mg/dL (3+) mg/dL    Ketones, UA Negative Negative    Urobilinogen, UA Normal Normal    Bilirubin Negative Negative    Blood, UA 3+ (A) Negative    Lot Number [id number]     Expiration Date 2023/10/05            Assessment and Plan    Diagnoses and all orders for this visit:    1. Acute UTI (Primary)  -     nitrofurantoin, macrocrystal-monohydrate, (Macrobid) 100 MG capsule; Take 1 capsule by mouth 2 (Two) Times a Day.  Dispense: 14 capsule; Refill: 0    2. Pain with urination  -     POCT urinalysis dipstick, automated  -     Urine Culture - Urine, Urine, Clean Catch    Other orders  -     fluconazole (Diflucan) 150 MG tablet; 1 tab po x 1 now, may repeat in 4 days prn yeast  Dispense: 2 tablet; Refill: 0      Push fluids  Rest  Tylenol as needed  Rx for Macrobid, Diflucan PRN provided  Urine culture pending--will notify with results when available    See PCP or RTC if symptoms persist/worsen  See PCP for routine f/u visit and management of chronic medical conditions      This document has been electronically signed by MARCELLA Thompson on May 16, 2022 12:56 CDT,.

## 2022-05-19 LAB — BACTERIA SPEC AEROBE CULT: ABNORMAL

## 2022-05-26 ENCOUNTER — OFFICE VISIT (OUTPATIENT)
Dept: GASTROENTEROLOGY | Facility: CLINIC | Age: 67
End: 2022-05-26

## 2022-05-26 VITALS
DIASTOLIC BLOOD PRESSURE: 81 MMHG | BODY MASS INDEX: 26.74 KG/M2 | HEART RATE: 86 BPM | HEIGHT: 66 IN | WEIGHT: 166.4 LBS | SYSTOLIC BLOOD PRESSURE: 133 MMHG

## 2022-05-26 DIAGNOSIS — R13.19 OTHER DYSPHAGIA: Primary | ICD-10-CM

## 2022-05-26 DIAGNOSIS — K59.04 CHRONIC IDIOPATHIC CONSTIPATION: ICD-10-CM

## 2022-05-26 DIAGNOSIS — R10.11 RIGHT UPPER QUADRANT ABDOMINAL PAIN: ICD-10-CM

## 2022-05-26 PROCEDURE — 99214 OFFICE O/P EST MOD 30 MIN: CPT | Performed by: INTERNAL MEDICINE

## 2022-05-26 RX ORDER — PLECANATIDE 3 MG/1
1 TABLET ORAL DAILY
Qty: 90 TABLET | Refills: 5 | Status: SHIPPED | OUTPATIENT
Start: 2022-05-26 | End: 2022-06-14

## 2022-05-26 RX ORDER — DEXTROSE AND SODIUM CHLORIDE 5; .45 G/100ML; G/100ML
30 INJECTION, SOLUTION INTRAVENOUS CONTINUOUS PRN
Status: CANCELLED | OUTPATIENT
Start: 2022-07-05

## 2022-05-26 NOTE — PROGRESS NOTES
"[facility]      Chief Complaint:   Chief Complaint   Patient presents with   • Med Refill     famotidine       Subjective     HPI:   Patient with worsening dysphagia since last visit.  Patient has had EGD with dilatation in the past.  Patient also states early satiety and bloating and has been told she has some problems with her gallbladder is unsure what it is.  Patient also with changes in bowel habits with constipation followed by diarrhea.    Plan; we will start patient on Trulance for constipation to see if any improvement in symptoms.  We will do a gastric emptying study will also do a HIDA scan we will repeat EGD with dilatation.    Past Medical History:   Past Medical History:   Diagnosis Date   • Anesthesia complication     states she \"stopped breathing during her last procedure\"   • Arthritis    • Asthma    • Elevated cholesterol    • Endometriosis    • Falls    • GERD (gastroesophageal reflux disease)    • Osteoporosis    • Pulmonary fibrosis (HCC)    • Scoliosis of thoracolumbar spine    • Seasonal rhinitis    • Sinusitis        Past Surgical History:    Past Surgical History:   Procedure Laterality Date   • APPENDECTOMY     • COLONOSCOPY     • COLONOSCOPY N/A 6/30/2020    Procedure: COLONOSCOPY;  Surgeon: Evan Rondon MD;  Location: [facility] ENDOSCOPY;  Service: Gastroenterology;  Laterality: N/A;   • ENDOSCOPY N/A 10/16/2019    Procedure: ESOPHAGOGASTRODUODENOSCOPY;  Surgeon: Evan Rondon MD;  Location: [facility] ENDOSCOPY;  Service: Gastroenterology   • ENDOSCOPY N/A 6/30/2020    Procedure: ESOPHAGOGASTRODUODENOSCOPY;  Surgeon: Evan Rondon MD;  Location: [facility] ENDOSCOPY;  Service: Gastroenterology;  Laterality: N/A;  savory dilation 48-54   • ENDOSCOPY N/A 11/17/2021    Procedure: ESOPHAGOGASTRODUODENOSCOPY;  Surgeon: Evan Rondon MD;  Location: [facility] ENDOSCOPY;  Service: Gastroenterology;  Laterality: N/A;  48-54 f eso dil. blue   • FOREARM SURGERY     • " HYSTERECTOMY  1984    total   • SALPINGO OOPHORECTOMY     • UPPER GASTROINTESTINAL ENDOSCOPY  10/16/2019   • UPPER GASTROINTESTINAL ENDOSCOPY  06/30/2020   • UPPER GASTROINTESTINAL ENDOSCOPY  11/17/2021   • WRIST FRACTURE SURGERY Right        Family History:  Family History   Problem Relation Age of Onset   • Heart failure Mother    • Thyroid disease Mother    • Ulcerative colitis Father        Social History:   reports that she quit smoking about 20 years ago. Her smoking use included cigarettes. She has a 50.00 pack-year smoking history. She has never used smokeless tobacco. She reports previous alcohol use. She reports that she does not use drugs.    Medications:   Prior to Admission medications    Medication Sig Start Date End Date Taking? Authorizing Provider   acetaminophen-codeine (TYLENOL #3) 300-30 MG per tablet Take 1 tablet by mouth 2 (Two) Times a Day As Needed for Moderate Pain .   Yes Roosevelt Bryant MD   Biotin 1 MG capsule Take 1 capsule by mouth Daily.   Yes Roosevelt Bryant MD   Calcium-Magnesium-Vitamin D - MG-MG-UNIT tablet sustained-release 24 hour Take 2 tablets by mouth Daily.   Yes Roosevelt Bryant MD   Cranberry 500 MG tablet Take 500 mg by mouth 2 (two) times a day.   Yes Roosevelt Bryant MD   dicyclomine (BENTYL) 20 MG tablet Take 1 tablet by mouth Every 6 (Six) Hours. 9/1/21  Yes Evan Rondon MD   famotidine (PEPCID) 20 MG tablet TAKE ONE TABLET BY MOUTH DAILY 4/19/22  Yes Evan Rondon MD   fexofenadine (Allegra Allergy) 180 MG tablet Take 1 tablet by mouth Daily. 12/16/21  Yes Mraiah De Jesus, DO   fluticasone (FLONASE) 50 MCG/ACT nasal spray 2 sprays into each nostril daily 7/1/21  Yes Mariah De Jesus, DO   Fluticasone Furoate-Vilanterol (BREO ELLIPTA) 200-25 MCG/INH inhaler Inhale 1 puff Daily. 4/25/22  Yes Mariah De Jesus, DO   ipratropium-albuterol (DUO-NEB) 0.5-2.5 mg/3 ml nebulizer Take 3 mL by nebulization Every 4 (Four) Hours As Needed  for Wheezing or Shortness of Air. 9/24/21  Yes Mariah De Jesus,    montelukast (SINGULAIR) 10 MG tablet Take 10 mg by mouth Daily. 3/1/22  Yes ProviderRoosevelt MD   olopatadine (PATANASE) 0.6 % solution nasal solution 2 sprays by Each Nare route 2 (Two) Times a Day. 7/1/21  Yes Mariah De Jesus DO   pantoprazole (PROTONIX) 40 MG EC tablet Take 1 tablet by mouth Daily. 9/1/21  Yes Evan Rondon MD   Potassium Iodide, Expectorant, (SSKI) 1 GM/ML solution Take 0.6 mL by mouth 3 (Three) Times a Day. 12/2/20  Yes Jorgito Evans MD   vitamin D3 125 MCG (5000 UT) capsule capsule Take 5,000 Units by mouth Daily. Takes 2 daily   Yes ProviderRoosevelt MD   Plecanatide (Trulance) 3 MG tablet Take 1 tablet by mouth Daily. 5/26/22   Evan Rondon MD   EPINEPHrine (EPIPEN) 0.3 MG/0.3ML solution auto-injector injection Inject 0.3 mL into the appropriate muscle as directed by prescriber Take As Directed. 8/18/21 5/26/22  Neena Kee APRN   fluconazole (Diflucan) 150 MG tablet 1 tab po x 1 now, may repeat in 4 days prn yeast 5/16/22 5/26/22  Brad George APRN   nitrofurantoin, macrocrystal-monohydrate, (Macrobid) 100 MG capsule Take 1 capsule by mouth 2 (Two) Times a Day. 5/16/22 5/26/22  Brad George APRN       Allergies:  Sulfa antibiotics, Levaquin [levofloxacin], Augmentin [amoxicillin-pot clavulanate], and Cefuroxime    ROS:    Review of Systems   Constitutional: Negative for activity change, appetite change, chills, diaphoresis, fatigue, fever and unexpected weight change.   HENT: Negative for sore throat and trouble swallowing.    Respiratory: Negative for shortness of breath.    Gastrointestinal: Positive for abdominal pain and constipation. Negative for abdominal distention, anal bleeding, blood in stool, diarrhea, nausea, rectal pain and vomiting.   Endocrine: Negative for polydipsia, polyphagia and polyuria.   Genitourinary: Negative for difficulty urinating.   Musculoskeletal: Negative for  "arthralgias.   Skin: Negative for pallor.   Allergic/Immunologic: Negative for food allergies.   Neurological: Negative for weakness and light-headedness.   Psychiatric/Behavioral: Negative for behavioral problems.     Objective     Blood pressure 133/81, pulse 86, height 166.4 cm (65.5\"), weight 75.5 kg (166 lb 6.4 oz).    Physical Exam  Constitutional:       General: She is not in acute distress.     Appearance: She is well-developed. She is not diaphoretic.   HENT:      Head: Normocephalic and atraumatic.   Cardiovascular:      Rate and Rhythm: Normal rate and regular rhythm.      Heart sounds: Normal heart sounds. No murmur heard.    No friction rub. No gallop.   Pulmonary:      Effort: No respiratory distress.      Breath sounds: Normal breath sounds. No wheezing or rales.   Chest:      Chest wall: No tenderness.   Abdominal:      General: Bowel sounds are normal. There is no distension.      Palpations: Abdomen is soft. There is no mass.      Tenderness: There is no abdominal tenderness. There is no guarding or rebound.      Hernia: No hernia is present.   Musculoskeletal:         General: Normal range of motion.   Skin:     General: Skin is warm and dry.      Coloration: Skin is not pale.      Findings: No erythema or rash.   Neurological:      Mental Status: She is alert and oriented to person, place, and time.   Psychiatric:         Behavior: Behavior normal.         Thought Content: Thought content normal.         Judgment: Judgment normal.          Assessment & Plan   Diagnoses and all orders for this visit:    1. Other dysphagia (Primary)  -     Case Request; Standing  -     Case Request  -     NM Gastric Emptying; Future  -     NM Hepatobiliary Without CCK; Future    2. Right upper quadrant abdominal pain  -     NM Gastric Emptying; Future  -     NM Hepatobiliary Without CCK; Future    3. Chronic idiopathic constipation    Other orders  -     Plecanatide (Trulance) 3 MG tablet; Take 1 tablet by mouth " Daily.  Dispense: 90 tablet; Refill: 5  -     Follow Anesthesia Guidelines / Standing Orders; Future  -     Obtain Informed Consent; Future        ESOPHAGOGASTRODUODENOSCOPY (N/A)     Diagnosis Plan   1. Other dysphagia  Case Request    Case Request    NM Gastric Emptying    NM Hepatobiliary Without CCK   2. Right upper quadrant abdominal pain  NM Gastric Emptying    NM Hepatobiliary Without CCK   3. Chronic idiopathic constipation         Anticipated Surgical Procedure:  Orders Placed This Encounter   Procedures   • NM Gastric Emptying     Standing Status:   Future     Standing Expiration Date:   5/26/2023   • NM Hepatobiliary Without CCK     Standing Status:   Future     Standing Expiration Date:   5/26/2023     Order Specific Question:   Do you want ejection fraction for this procedure?     Answer:   Yes     Order Specific Question:   Release to patient     Answer:   Immediate   • Follow Anesthesia Guidelines / Standing Orders     Standing Status:   Future   • Obtain Informed Consent     Standing Status:   Future     Order Specific Question:   Informed Consent Given For     Answer:   ESOPHAGOGASTRODUODENOSCOPY       The risks, benefits, and alternatives of this procedure have been discussed with the patient or the responsible party- the patient understands and agrees to proceed.

## 2022-06-08 ENCOUNTER — HOSPITAL ENCOUNTER (OUTPATIENT)
Dept: NUCLEAR MEDICINE | Facility: HOSPITAL | Age: 67
Discharge: HOME | End: 2022-06-08

## 2022-06-08 DIAGNOSIS — R10.11 RIGHT UPPER QUADRANT ABDOMINAL PAIN: ICD-10-CM

## 2022-06-08 DIAGNOSIS — R13.19 OTHER DYSPHAGIA: ICD-10-CM

## 2022-06-08 PROCEDURE — 0 TECHNETIUM TC 99M MEBROFENIN KIT: Performed by: INTERNAL MEDICINE

## 2022-06-08 PROCEDURE — 78226 HEPATOBILIARY SYSTEM IMAGING: CPT

## 2022-06-08 PROCEDURE — A9537 TC99M MEBROFENIN: HCPCS | Performed by: INTERNAL MEDICINE

## 2022-06-08 RX ORDER — KIT FOR THE PREPARATION OF TECHNETIUM TC 99M MEBROFENIN 45 MG/10ML
1 INJECTION, POWDER, LYOPHILIZED, FOR SOLUTION INTRAVENOUS
Status: COMPLETED | OUTPATIENT
Start: 2022-06-08 | End: 2022-06-08

## 2022-06-08 RX ADMIN — MEBROFENIN 1 DOSE: 45 INJECTION, POWDER, LYOPHILIZED, FOR SOLUTION INTRAVENOUS at 09:04

## 2022-06-09 ENCOUNTER — TELEPHONE (OUTPATIENT)
Dept: GASTROENTEROLOGY | Facility: CLINIC | Age: 67
End: 2022-06-09

## 2022-06-09 NOTE — TELEPHONE ENCOUNTER
06/09/2022, 1106 - [facility] telephoned per this staff member [phone].  Spoke with Sherri, Pharmacy Technician.  Pharmacy technician made aware Prior Authorization approval received via Cover My Meds for prescription medication Trulance 3 MG Tablets effective 01/01//2022 - 05/26/2023.  Pharmacy Technician stated patient notified pharmacy on 06/08/2022 she did not want to collect prescription medication.    06/09/2022, 1113 - Patient telephoned per this staff member [phone] with notification Prior Authorization approval received via Cover My Meds for prescription medication Trulance 3 MG Tablets effective 01/01//2022 - 05/26/2023 and inquiry why patient opted not to collect prescription medication Trulance 3 MG Tablets from pharmacy.  Patient states she experiences diarrhea more than constipation. Patient states she experiences Diarrhea X 5 days per week on average followed by constipation.  Patient stated she fears Trulance 3 MG Tablets would worsen episodes of diarrhea.

## 2022-06-13 ENCOUNTER — HOSPITAL ENCOUNTER (OUTPATIENT)
Dept: NUCLEAR MEDICINE | Facility: HOSPITAL | Age: 67
Discharge: HOME | End: 2022-06-13

## 2022-06-13 DIAGNOSIS — R10.11 RIGHT UPPER QUADRANT ABDOMINAL PAIN: ICD-10-CM

## 2022-06-13 DIAGNOSIS — R13.19 OTHER DYSPHAGIA: ICD-10-CM

## 2022-06-13 PROCEDURE — A9541 TC99M SULFUR COLLOID: HCPCS | Performed by: INTERNAL MEDICINE

## 2022-06-13 PROCEDURE — 78264 GASTRIC EMPTYING IMG STUDY: CPT

## 2022-06-13 PROCEDURE — 0 TECHNETIUM SULFUR COLLOID: Performed by: INTERNAL MEDICINE

## 2022-06-13 RX ADMIN — TECHNETIUM TC 99M SULFUR COLLOID 1 DOSE: KIT at 07:12

## 2022-06-14 ENCOUNTER — OFFICE VISIT (OUTPATIENT)
Dept: PULMONOLOGY | Facility: CLINIC | Age: 67
End: 2022-06-14

## 2022-06-14 VITALS
HEART RATE: 87 BPM | OXYGEN SATURATION: 99 % | HEIGHT: 66 IN | WEIGHT: 169.4 LBS | SYSTOLIC BLOOD PRESSURE: 136 MMHG | BODY MASS INDEX: 27.23 KG/M2 | DIASTOLIC BLOOD PRESSURE: 86 MMHG

## 2022-06-14 DIAGNOSIS — J45.40 MODERATE PERSISTENT ASTHMA WITHOUT COMPLICATION: Primary | ICD-10-CM

## 2022-06-14 DIAGNOSIS — R06.83 SNORING: ICD-10-CM

## 2022-06-14 PROCEDURE — 99214 OFFICE O/P EST MOD 30 MIN: CPT | Performed by: INTERNAL MEDICINE

## 2022-06-14 RX ORDER — PREDNISONE 50 MG/1
50 TABLET ORAL DAILY
Qty: 7 TABLET | Refills: 0 | Status: SHIPPED | OUTPATIENT
Start: 2022-06-14 | End: 2022-07-11

## 2022-06-14 NOTE — PROGRESS NOTES
Pulmonary Office Follow-up    Subjective     Genna Garcia is seen today at the office for   Chief Complaint   Patient presents with   • Asthma   • Cough         HPI  Genna Garcia is a 67 y.o. female with a PMH significant for bronciectasis.     6/14/22  Patient here for routine follow up. She reports that for the last few months, more chest proessure and trouble breahting. Neb treatments do help, she brings up more mucus with those. She is using Breo at night before bed.  She has been having more snoring lately and concerns about apneas  She has seen Dr Kathleen, has an EGD scheduled for July 5th with plans for a dilation  She saw allergy and was started on Singulair. She was told she couldn't get skin testing done because she would have to stop all of her antihistamines for two weeks and she doesn't feel she can stop them for that long    12/16/21  Patient here for follow up. She is doing pretty well from a breathing standpoint. Has episodes of getting more winded. She notices it mostly when the weather is bad. She's still doing her Breo daily in the afternoon which helps. Her asthma seems well controlled. She does feel like her allergies are worse though. She used to get allergy shots and had to stop when she broke her arm. She would like to see a new allergist about getting back on IT schedule  She saw GI and had an esophageal dilation again since last visit. She is eating better. However she still has coughing with eating    9/24/21  Patient here for follow up. At last visit I ordered her Symbicort but her insurance wouldn't cover it. She apparently got generic low dose Symbicort though, and this gave her severe headaches. Went back to Breo but taking it later in the day and that worked better  She continues to have coughing when she eats. She didn't discuss this with her GI doc at last appointment but she is going to see him again    Last OV 7/1/21  Last seen by Dr Evans in  Feb  Patient with multiple overlapping issues going on. Essentially she has chronic productive cough from several sources. She has reflux and symptoms of aspiration (coughing with/after eating), and the lower lobe fibrosis on her Ct scan supports this. I can't find a swallow study anywhere. She has esophageal strictures that she has to have stretched periodically   She has allergies, previously on allergy shots but can't afford them now. She has seen ENT before, was going to have sinus surgery but held off for other medical reasons and now her ENT doctor has left. She has run out of her nasal sprays and Allegra.  She is on Breo and that helps for most of the morning but by late afternoon she is having wheezing and coughing again. She uses Duonebs and Combivent as needed    Tobacco use history:  Type: cigarettes  Amount: 2-3 ppd  Duration: 18 years  Cessation: 2002   Willing to quit: N/A      Patient Active Problem List   Diagnosis   • Scoliosis of thoracolumbar spine   • Other dysphagia   • Pain in joint of right shoulder   • Change in bowel habits   • Chronic allergic rhinitis   • Severe allergic reaction with respiratory distress   • Senile osteoporosis   • Gastroesophageal reflux disease without esophagitis   • History of esophageal stricture   • History of esophageal dilatation   • Dysphagia   • History of tobacco abuse   • Postmenopausal   • Early menopause occurring in patient age younger than 45 years   • Irritable bowel syndrome   • Chronic obstructive pulmonary disease (HCC)         Medications, Allergies, Social, and Family Histories reviewed as per EMR.    Objective     Vitals:    06/14/22 1113   BP: 136/86   Pulse: 87   SpO2: 99%         06/14/22  1113   Weight: 76.8 kg (169 lb 6.4 oz)     [unfilled]  Physical Exam  Vitals reviewed.   Constitutional:       Appearance: Normal appearance.   HENT:      Head: Normocephalic and atraumatic.      Nose: Nose normal.      Mouth/Throat:      Mouth: Mucous  membranes are moist.      Pharynx: Oropharynx is clear.   Eyes:      Conjunctiva/sclera: Conjunctivae normal.      Pupils: Pupils are equal, round, and reactive to light.   Cardiovascular:      Rate and Rhythm: Normal rate and regular rhythm.      Pulses: Normal pulses.      Heart sounds: Normal heart sounds.   Pulmonary:      Effort: Pulmonary effort is normal.      Breath sounds: Normal breath sounds.   Abdominal:      General: Abdomen is flat. Bowel sounds are normal.      Palpations: Abdomen is soft.   Musculoskeletal:         General: Normal range of motion.      Cervical back: Normal range of motion.   Skin:     General: Skin is warm and dry.   Neurological:      General: No focal deficit present.      Mental Status: She is alert and oriented to person, place, and time.   Psychiatric:         Mood and Affect: Mood normal.         Behavior: Behavior normal.               PFTs: 10/21/20 (independently reviewed and interpreted by me)  Ratio 78  FVC 2.22/ 69%  FEV1 1.73/ 69%  TLC 2.77/ 53%  DLCO 15.66/ 61%  Variable effort.  Mild restriction. Mildly reduced diffusing capacity. No comparative data available.     Radiology (independently reviewed and interpreted by me): CT chest without contrast 5/20/2020 showed small focus of nodularity posterior right upper lobe, lower lobe predominant interstitial opacity with air bronchograms and traction bronchiectasis, numerous calcified granulomas bilaterally, mildly enlarged mediastinal lymph nodes largest precarinal measuring 1.1 x 1.55 cm, small axillary and retropectoral lymph nodes largest measuring 8.5 mm, findings similar to 4/2/2016. Notable thoracic/lumbar scoliosis       Assessment & Plan     Diagnoses and all orders for this visit:    1. Moderate persistent asthma without complication (Primary)    2. Snoring    Other orders  -     predniSONE (DELTASONE) 50 MG tablet; Take 1 tablet by mouth Daily.  Dispense: 7 tablet; Refill: 0         Discussion/ Recommendations:    Patient with increases cough and dyspnea, will do a week of steroids and see how she responds. Probably need to get updated PFTs soon. Suspect she may be having some microaspiration as her esophageal stricture has returned, as a lot of her symptoms are with eating.   Will send patient for PSG  Will see her back next month after her EGD and dilation      No follow-ups on file.          This document has been electronically signed by Mariah De Jesus DO on June 14, 2022 13:56 CDT

## 2022-06-28 RX ORDER — PANTOPRAZOLE SODIUM 40 MG/1
TABLET, DELAYED RELEASE ORAL
Qty: 30 TABLET | Refills: 5 | Status: SHIPPED | OUTPATIENT
Start: 2022-06-28 | End: 2022-07-15 | Stop reason: SDUPTHER

## 2022-07-05 ENCOUNTER — HOSPITAL ENCOUNTER (OUTPATIENT)
Facility: HOSPITAL | Age: 67
Setting detail: HOSPITAL OUTPATIENT SURGERY
Discharge: HOME | End: 2022-07-05
Attending: INTERNAL MEDICINE | Admitting: INTERNAL MEDICINE

## 2022-07-05 ENCOUNTER — ANESTHESIA EVENT (OUTPATIENT)
Dept: GASTROENTEROLOGY | Facility: HOSPITAL | Age: 67
End: 2022-07-05

## 2022-07-05 ENCOUNTER — ANESTHESIA (OUTPATIENT)
Dept: GASTROENTEROLOGY | Facility: HOSPITAL | Age: 67
End: 2022-07-05

## 2022-07-05 VITALS
OXYGEN SATURATION: 97 % | HEART RATE: 83 BPM | DIASTOLIC BLOOD PRESSURE: 61 MMHG | WEIGHT: 169 LBS | BODY MASS INDEX: 27.16 KG/M2 | RESPIRATION RATE: 18 BRPM | SYSTOLIC BLOOD PRESSURE: 119 MMHG | TEMPERATURE: 97.4 F | HEIGHT: 66 IN

## 2022-07-05 DIAGNOSIS — R13.19 OTHER DYSPHAGIA: ICD-10-CM

## 2022-07-05 PROCEDURE — 43248 EGD GUIDE WIRE INSERTION: CPT | Performed by: INTERNAL MEDICINE

## 2022-07-05 PROCEDURE — 88305 TISSUE EXAM BY PATHOLOGIST: CPT

## 2022-07-05 PROCEDURE — C1769 GUIDE WIRE: HCPCS | Performed by: INTERNAL MEDICINE

## 2022-07-05 PROCEDURE — 43239 EGD BIOPSY SINGLE/MULTIPLE: CPT | Performed by: INTERNAL MEDICINE

## 2022-07-05 PROCEDURE — 25010000002 PROPOFOL 10 MG/ML EMULSION: Performed by: NURSE ANESTHETIST, CERTIFIED REGISTERED

## 2022-07-05 RX ORDER — LIDOCAINE HYDROCHLORIDE 20 MG/ML
INJECTION, SOLUTION INTRAVENOUS AS NEEDED
Status: DISCONTINUED | OUTPATIENT
Start: 2022-07-05 | End: 2022-07-05 | Stop reason: SURG

## 2022-07-05 RX ORDER — DEXTROSE AND SODIUM CHLORIDE 5; .45 G/100ML; G/100ML
30 INJECTION, SOLUTION INTRAVENOUS CONTINUOUS PRN
Status: DISCONTINUED | OUTPATIENT
Start: 2022-07-05 | End: 2022-07-05 | Stop reason: HOSPADM

## 2022-07-05 RX ORDER — PROPOFOL 10 MG/ML
VIAL (ML) INTRAVENOUS AS NEEDED
Status: DISCONTINUED | OUTPATIENT
Start: 2022-07-05 | End: 2022-07-05 | Stop reason: SURG

## 2022-07-05 RX ADMIN — PROPOFOL 60 MG: 10 INJECTION, EMULSION INTRAVENOUS at 11:08

## 2022-07-05 RX ADMIN — DEXTROSE AND SODIUM CHLORIDE 30 ML/HR: 5; 450 INJECTION, SOLUTION INTRAVENOUS at 10:59

## 2022-07-05 RX ADMIN — LIDOCAINE HYDROCHLORIDE 100 MG: 20 INJECTION, SOLUTION INTRAVENOUS at 11:03

## 2022-07-05 RX ADMIN — PROPOFOL 20 MG: 10 INJECTION, EMULSION INTRAVENOUS at 11:05

## 2022-07-05 RX ADMIN — PROPOFOL 100 MG: 10 INJECTION, EMULSION INTRAVENOUS at 11:03

## 2022-07-05 NOTE — H&P
"[facility]      Chief Complaint:   No chief complaint on file.      Subjective     HPI:   Patient with worsening dysphagia since last visit.  Patient has had EGD with dilatation in the past.  Patient also states early satiety and bloating and has been told she has some problems with her gallbladder is unsure what it is.  Patient also with changes in bowel habits with constipation followed by diarrhea.    Plan; we will start patient on Trulance for constipation to see if any improvement in symptoms.  We will do a gastric emptying study will also do a HIDA scan we will repeat EGD with dilatation.    Past Medical History:   Past Medical History:   Diagnosis Date   • Anesthesia complication     states she \"stopped breathing during her last procedure\"   • Arthritis    • Asthma    • Elevated cholesterol    • Endometriosis    • Falls    • GERD (gastroesophageal reflux disease)    • Osteoporosis    • Pulmonary fibrosis (HCC)    • Scoliosis of thoracolumbar spine    • Seasonal rhinitis    • Sinusitis        Past Surgical History:    Past Surgical History:   Procedure Laterality Date   • APPENDECTOMY     • COLONOSCOPY     • COLONOSCOPY N/A 6/30/2020    Procedure: COLONOSCOPY;  Surgeon: Evan Rondon MD;  Location: [facility] ENDOSCOPY;  Service: Gastroenterology;  Laterality: N/A;   • ENDOSCOPY N/A 10/16/2019    Procedure: ESOPHAGOGASTRODUODENOSCOPY;  Surgeon: Evan Rondon MD;  Location: [facility] ENDOSCOPY;  Service: Gastroenterology   • ENDOSCOPY N/A 6/30/2020    Procedure: ESOPHAGOGASTRODUODENOSCOPY;  Surgeon: Evan Rondon MD;  Location: [facility] ENDOSCOPY;  Service: Gastroenterology;  Laterality: N/A;  savory dilation 48-54   • ENDOSCOPY N/A 11/17/2021    Procedure: ESOPHAGOGASTRODUODENOSCOPY;  Surgeon: Evan Rondon MD;  Location: [facility] ENDOSCOPY;  Service: Gastroenterology;  Laterality: N/A;  48-54 f eso dil. blue   • FOREARM SURGERY     • HYSTERECTOMY  1984    total   • SALPINGO " OOPHORECTOMY     • UPPER GASTROINTESTINAL ENDOSCOPY  10/16/2019   • UPPER GASTROINTESTINAL ENDOSCOPY  06/30/2020   • UPPER GASTROINTESTINAL ENDOSCOPY  11/17/2021   • WRIST FRACTURE SURGERY Right        Family History:  Family History   Problem Relation Age of Onset   • Heart failure Mother    • Thyroid disease Mother    • Ulcerative colitis Father        Social History:   reports that she quit smoking about 20 years ago. Her smoking use included cigarettes. She has a 50.00 pack-year smoking history. She has never used smokeless tobacco. She reports previous alcohol use. She reports that she does not use drugs.    Medications:   Prior to Admission medications    Medication Sig Start Date End Date Taking? Authorizing Provider   acetaminophen-codeine (TYLENOL #3) 300-30 MG per tablet Take 1 tablet by mouth 2 (Two) Times a Day As Needed for Moderate Pain .   Yes Roosevelt Bryant MD   Biotin 1 MG capsule Take 1 capsule by mouth Daily.   Yes Roosevelt Bryant MD   Calcium-Magnesium-Vitamin D - MG-MG-UNIT tablet sustained-release 24 hour Take 2 tablets by mouth Daily.   Yes Roosevelt Bryant MD   Cranberry 500 MG tablet Take 500 mg by mouth 2 (two) times a day.   Yes Roosevelt Bryant MD   dicyclomine (BENTYL) 20 MG tablet Take 1 tablet by mouth Every 6 (Six) Hours. 9/1/21  Yes Evan Rondon MD   famotidine (PEPCID) 20 MG tablet TAKE ONE TABLET BY MOUTH DAILY 4/19/22  Yes Evan Rondon MD   fexofenadine (Allegra Allergy) 180 MG tablet Take 1 tablet by mouth Daily. 12/16/21  Yes Mariah De Jesus, DO   fluticasone (FLONASE) 50 MCG/ACT nasal spray 2 sprays into each nostril daily 7/1/21  Yes Mariah De Jesus, DO   Fluticasone Furoate-Vilanterol (BREO ELLIPTA) 200-25 MCG/INH inhaler Inhale 1 puff Daily. 4/25/22  Yes Mariah De Jessu, DO   ipratropium-albuterol (DUO-NEB) 0.5-2.5 mg/3 ml nebulizer Take 3 mL by nebulization Every 4 (Four) Hours As Needed for Wheezing or Shortness of Air. 9/24/21   Yes Mariah De Jesus, DO   montelukast (SINGULAIR) 10 MG tablet Take 10 mg by mouth Daily. 3/1/22  Yes ProviderRoosevelt MD   olopatadine (PATANASE) 0.6 % solution nasal solution 2 sprays by Each Nare route 2 (Two) Times a Day. 7/1/21  Yes Mariah De Jesus DO   pantoprazole (PROTONIX) 40 MG EC tablet Take 1 tablet by mouth Daily. 9/1/21  Yes Evan Rondon MD   Potassium Iodide, Expectorant, (SSKI) 1 GM/ML solution Take 0.6 mL by mouth 3 (Three) Times a Day. 12/2/20  Yes Jorgito Evans MD   vitamin D3 125 MCG (5000 UT) capsule capsule Take 5,000 Units by mouth Daily. Takes 2 daily   Yes ProviderRoosevelt MD   Plecanatide (Trulance) 3 MG tablet Take 1 tablet by mouth Daily. 5/26/22   Evan Rondon MD   EPINEPHrine (EPIPEN) 0.3 MG/0.3ML solution auto-injector injection Inject 0.3 mL into the appropriate muscle as directed by prescriber Take As Directed. 8/18/21 5/26/22  Neena Kee APRN   fluconazole (Diflucan) 150 MG tablet 1 tab po x 1 now, may repeat in 4 days prn yeast 5/16/22 5/26/22  Brad George APRN   nitrofurantoin, macrocrystal-monohydrate, (Macrobid) 100 MG capsule Take 1 capsule by mouth 2 (Two) Times a Day. 5/16/22 5/26/22  Brad George APRN       Allergies:  Sulfa antibiotics, Levaquin [levofloxacin], Augmentin [amoxicillin-pot clavulanate], and Cefuroxime    ROS:    Review of Systems   Constitutional: Negative for activity change, appetite change, chills, diaphoresis, fatigue, fever and unexpected weight change.   HENT: Negative for sore throat and trouble swallowing.    Respiratory: Negative for shortness of breath.    Gastrointestinal: Positive for abdominal pain and constipation. Negative for abdominal distention, anal bleeding, blood in stool, diarrhea, nausea, rectal pain and vomiting.   Endocrine: Negative for polydipsia, polyphagia and polyuria.   Genitourinary: Negative for difficulty urinating.   Musculoskeletal: Negative for arthralgias.   Skin: Negative for pallor.  "  Allergic/Immunologic: Negative for food allergies.   Neurological: Negative for weakness and light-headedness.   Psychiatric/Behavioral: Negative for behavioral problems.     Objective     Height 166.4 cm (65.5\"), weight 77.1 kg (170 lb).    Physical Exam  Constitutional:       General: She is not in acute distress.     Appearance: She is well-developed. She is not diaphoretic.   HENT:      Head: Normocephalic and atraumatic.   Cardiovascular:      Rate and Rhythm: Normal rate and regular rhythm.      Heart sounds: Normal heart sounds. No murmur heard.    No friction rub. No gallop.   Pulmonary:      Effort: No respiratory distress.      Breath sounds: Normal breath sounds. No wheezing or rales.   Chest:      Chest wall: No tenderness.   Abdominal:      General: Bowel sounds are normal. There is no distension.      Palpations: Abdomen is soft. There is no mass.      Tenderness: There is no abdominal tenderness. There is no guarding or rebound.      Hernia: No hernia is present.   Musculoskeletal:         General: Normal range of motion.   Skin:     General: Skin is warm and dry.      Coloration: Skin is not pale.      Findings: No erythema or rash.   Neurological:      Mental Status: She is alert and oriented to person, place, and time.   Psychiatric:         Behavior: Behavior normal.         Thought Content: Thought content normal.         Judgment: Judgment normal.          Assessment & Plan   There are no diagnoses linked to this encounter.    ESOPHAGOGASTRODUODENOSCOPY (N/A)    No diagnosis found.    Anticipated Surgical Procedure:  No orders of the defined types were placed in this encounter.      The risks, benefits, and alternatives of this procedure have been discussed with the patient or the responsible party- the patient understands and agrees to proceed.                                                                "

## 2022-07-05 NOTE — ANESTHESIA POSTPROCEDURE EVALUATION
Patient: Genna Garcia    Procedure Summary     Date: 07/05/22 Room / Location: [facility] ENDOSCOPY 1 / [facility] ENDOSCOPY    Anesthesia Start: 1101 Anesthesia Stop: 1110    Procedure: ESOPHAGOGASTRODUODENOSCOPY (N/A ) Diagnosis:       Other dysphagia      (Other dysphagia [R13.19])    Surgeons: Evan Rondon MD Provider: Itzel Damon CRNA    Anesthesia Type: MAC ASA Status: 3          Anesthesia Type: MAC    Vitals  No vitals data found for the desired time range.          Post Anesthesia Care and Evaluation    Patient location during evaluation: PHASE II  Patient participation: complete - patient participated  Level of consciousness: awake and alert  Pain score: 0  Pain management: adequate    Airway patency: patent  Anesthetic complications: No anesthetic complications  PONV Status: none  Cardiovascular status: acceptable  Respiratory status: acceptable  Hydration status: acceptable

## 2022-07-05 NOTE — ANESTHESIA PREPROCEDURE EVALUATION
Anesthesia Evaluation     NPO Solid Status: > 8 hours  NPO Liquid Status: > 8 hours           Airway   Mallampati: III  TM distance: >3 FB  Neck ROM: full  Dental    (+) poor dentition    Pulmonary - normal exam   (+) asthma,  Cardiovascular - normal exam    (+) hyperlipidemia,       Neuro/Psych  GI/Hepatic/Renal/Endo    (+)  GERD,      Musculoskeletal     Abdominal    Substance History      OB/GYN          Other   arthritis,                        Anesthesia Plan    ASA 3     MAC     intravenous induction     Anesthetic plan, risks, benefits, and alternatives have been provided, discussed and informed consent has been obtained with: patient.

## 2022-07-07 LAB — REF LAB TEST METHOD: NORMAL

## 2022-07-11 ENCOUNTER — OFFICE VISIT (OUTPATIENT)
Dept: PULMONOLOGY | Facility: CLINIC | Age: 67
End: 2022-07-11

## 2022-07-11 VITALS
OXYGEN SATURATION: 97 % | HEART RATE: 84 BPM | BODY MASS INDEX: 27.68 KG/M2 | SYSTOLIC BLOOD PRESSURE: 118 MMHG | HEIGHT: 66 IN | DIASTOLIC BLOOD PRESSURE: 72 MMHG | WEIGHT: 172.2 LBS

## 2022-07-11 DIAGNOSIS — J45.40 MODERATE PERSISTENT ASTHMA WITHOUT COMPLICATION: Primary | ICD-10-CM

## 2022-07-11 DIAGNOSIS — R05.3 CHRONIC COUGH: ICD-10-CM

## 2022-07-11 PROCEDURE — 99214 OFFICE O/P EST MOD 30 MIN: CPT | Performed by: INTERNAL MEDICINE

## 2022-07-11 RX ORDER — ALBUTEROL SULFATE 90 UG/1
2 AEROSOL, METERED RESPIRATORY (INHALATION) EVERY 4 HOURS PRN
Qty: 18 G | Refills: 2 | Status: SHIPPED | OUTPATIENT
Start: 2022-07-11 | End: 2022-12-15 | Stop reason: SDUPTHER

## 2022-07-11 RX ORDER — PREDNISONE 10 MG/1
TABLET ORAL
Qty: 27 TABLET | Refills: 0 | Status: SHIPPED | OUTPATIENT
Start: 2022-07-11 | End: 2022-08-08

## 2022-07-11 NOTE — PROGRESS NOTES
Pulmonary Office Follow-up    Subjective     Genna Garcia is seen today at the office for   Chief Complaint   Patient presents with   • Moderate persistent asthma without complication     Chief Complaint          HPI  Genna Garcia is a 67 y.o. female with a PMH significant for bronciectasis.     7/11/22  Patient here to follow up after EGD for esophageal dilation. She feels like she is swallowing better and having less chest pressure. Still coughing with eating though. She coughs other times too, but has more of a choking cough with eating. Does feel like the steroids helped as well.      6/14/22  Patient here for routine follow up. She reports that for the last few months, more chest proessure and trouble breahting. Neb treatments do help, she brings up more mucus with those. She is using Breo at night before bed.  She has been having more snoring lately and concerns about apneas  She has seen Dr Rondon, has an EGD scheduled for July 5th with plans for a dilation  She saw allergy and was started on Singulair. She was told she couldn't get skin testing done because she would have to stop all of her antihistamines for two weeks and she doesn't feel she can stop them for that long    12/16/21  Patient here for follow up. She is doing pretty well from a breathing standpoint. Has episodes of getting more winded. She notices it mostly when the weather is bad. She's still doing her Breo daily in the afternoon which helps. Her asthma seems well controlled. She does feel like her allergies are worse though. She used to get allergy shots and had to stop when she broke her arm. She would like to see a new allergist about getting back on IT schedule  She saw GI and had an esophageal dilation again since last visit. She is eating better. However she still has coughing with eating    9/24/21  Patient here for follow up. At last visit I ordered her Symbicort but her insurance wouldn't cover it. She  apparently got generic low dose Symbicort though, and this gave her severe headaches. Went back to Breo but taking it later in the day and that worked better  She continues to have coughing when she eats. She didn't discuss this with her GI doc at last appointment but she is going to see him again    Last OV 7/1/21  Last seen by Dr Evans in Feb  Patient with multiple overlapping issues going on. Essentially she has chronic productive cough from several sources. She has reflux and symptoms of aspiration (coughing with/after eating), and the lower lobe fibrosis on her Ct scan supports this. I can't find a swallow study anywhere. She has esophageal strictures that she has to have stretched periodically   She has allergies, previously on allergy shots but can't afford them now. She has seen ENT before, was going to have sinus surgery but held off for other medical reasons and now her ENT doctor has left. She has run out of her nasal sprays and Allegra.  She is on Breo and that helps for most of the morning but by late afternoon she is having wheezing and coughing again. She uses Duonebs and Combivent as needed    Tobacco use history:  Type: cigarettes  Amount: 2-3 ppd  Duration: 18 years  Cessation: 2002   Willing to quit: N/A      Patient Active Problem List   Diagnosis   • Scoliosis of thoracolumbar spine   • Other dysphagia   • Pain in joint of right shoulder   • Change in bowel habits   • Chronic allergic rhinitis   • Severe allergic reaction with respiratory distress   • Senile osteoporosis   • Gastroesophageal reflux disease without esophagitis   • History of esophageal stricture   • History of esophageal dilatation   • Dysphagia   • History of tobacco abuse   • Postmenopausal   • Early menopause occurring in patient age younger than 45 years   • Irritable bowel syndrome   • Chronic obstructive pulmonary disease (HCC)         Medications, Allergies, Social, and Family Histories reviewed as per EMR.    Objective      Vitals:    07/11/22 0844   BP: 118/72   Pulse: 84   SpO2: 97%         07/11/22  0844   Weight: 78.1 kg (172 lb 3.2 oz)     [unfilled]  Physical Exam  Vitals reviewed.   Constitutional:       Appearance: Normal appearance.   HENT:      Head: Normocephalic and atraumatic.      Nose: Nose normal.      Mouth/Throat:      Mouth: Mucous membranes are moist.      Pharynx: Oropharynx is clear.   Eyes:      Conjunctiva/sclera: Conjunctivae normal.      Pupils: Pupils are equal, round, and reactive to light.   Cardiovascular:      Rate and Rhythm: Normal rate and regular rhythm.      Pulses: Normal pulses.      Heart sounds: Normal heart sounds.   Pulmonary:      Effort: Pulmonary effort is normal.      Breath sounds: Normal breath sounds.   Abdominal:      General: Abdomen is flat. Bowel sounds are normal.      Palpations: Abdomen is soft.   Musculoskeletal:         General: Normal range of motion.      Cervical back: Normal range of motion.   Skin:     General: Skin is warm and dry.   Neurological:      General: No focal deficit present.      Mental Status: She is alert and oriented to person, place, and time.   Psychiatric:         Mood and Affect: Mood normal.         Behavior: Behavior normal.               PFTs:  (independently reviewed and interpreted by me)  10/21/20  FVC 2.22L,  69%  FEV1 1.73L,  69%  Ratio 78%  TLC 2.77L,  53%  DLCO 15.66L,  61%  Variable effort.  Mild restriction. Mildly reduced diffusing capacity. No comparative data available.     Radiology (independently reviewed and interpreted by me):   CT chest  5/20/2020 - small focus of nodularity posterior right upper lobe, lower lobe predominant interstitial opacity with air bronchograms and traction bronchiectasis, numerous calcified granulomas bilaterally, mildly enlarged mediastinal lymph nodes largest precarinal measuring 1.1 x 1.55 cm, small axillary and retropectoral lymph nodes largest measuring 8.5 mm, findings similar to 4/2/2016. Notable  thoracic/lumbar scoliosis       Assessment & Plan     Diagnoses and all orders for this visit:    1. Moderate persistent asthma without complication (Primary)  -     Full Pulmonary Function Test With Bronchodilator; Future    2. Chronic cough  -     CT Chest Without Contrast; Future  -     FL Video Swallow With Speech Single Contrast; Future    Other orders  -     albuterol sulfate HFA (Ventolin HFA) 108 (90 Base) MCG/ACT inhaler; Inhale 2 puffs Every 4 (Four) Hours As Needed for Wheezing or Shortness of Air.  Dispense: 18 g; Refill: 2  -     predniSONE (DELTASONE) 10 MG tablet; Take 40mg PO daily x 3d, then 30mg PO x 3d, then 20mg PO x 2d, then 10mg PO x 2d then stop  Dispense: 27 tablet; Refill: 0         Discussion/ Recommendations:   Patient with chronic cough, worse here lately. Multiple possible causes and likely multifactorial. Will repeat PFTs and a CT chest and check for primary pulmonary issues (infection, worsened lung function, evidence of chronic aspiration). I do want her to do a swallow eval as well since she clearly endorses a choking cough with eating. Giving her a sample of Breztri to try for a few weeks and see if that works better than Breo. Also needs a SHILPA to have. Prednisone to restart if cough gets worse. Will see her back after testing    -CT chest  -PFTs  -Hold Breo and try Breztri (sample given)  -Swallow study  -Albuterol prn  -Prednisone if needed    Follow up after testing      No follow-ups on file.          This document has been electronically signed by Mariah De Jesus DO on July 11, 2022 09:09 CDT

## 2022-07-14 ENCOUNTER — TELEPHONE (OUTPATIENT)
Dept: FAMILY MEDICINE CLINIC | Facility: CLINIC | Age: 67
End: 2022-07-14

## 2022-07-14 ENCOUNTER — TELEPHONE (OUTPATIENT)
Dept: PULMONOLOGY | Facility: CLINIC | Age: 67
End: 2022-07-14

## 2022-07-14 NOTE — TELEPHONE ENCOUNTER
Per Dr. De Jesus, a prescription for Breztri has been sent to [facility].  Her Breo was discontinued.          ----- Message from Mary Starr sent at 7/13/2022 10:19 AM CDT -----  Contact: [phone]  Pt requesting to stop the Breo inhaler and continue with the inhaler that she was given last Monday, the Breztri Aerosphere. She loves it and it's working very well for her. She would like a RX sent to [facility].

## 2022-07-15 ENCOUNTER — OFFICE VISIT (OUTPATIENT)
Dept: GASTROENTEROLOGY | Facility: CLINIC | Age: 67
End: 2022-07-15

## 2022-07-15 VITALS
HEART RATE: 93 BPM | WEIGHT: 171.2 LBS | SYSTOLIC BLOOD PRESSURE: 104 MMHG | HEIGHT: 66 IN | BODY MASS INDEX: 27.51 KG/M2 | DIASTOLIC BLOOD PRESSURE: 74 MMHG

## 2022-07-15 DIAGNOSIS — R13.19 OTHER DYSPHAGIA: Primary | ICD-10-CM

## 2022-07-15 PROCEDURE — 99214 OFFICE O/P EST MOD 30 MIN: CPT | Performed by: INTERNAL MEDICINE

## 2022-07-15 RX ORDER — FAMOTIDINE 20 MG/1
20 TABLET, FILM COATED ORAL DAILY
Qty: 30 TABLET | Refills: 3 | Status: SHIPPED | OUTPATIENT
Start: 2022-07-15 | End: 2022-12-13

## 2022-07-15 RX ORDER — PANTOPRAZOLE SODIUM 40 MG/1
40 TABLET, DELAYED RELEASE ORAL DAILY
Qty: 30 TABLET | Refills: 5 | Status: SHIPPED | OUTPATIENT
Start: 2022-07-15 | End: 2022-10-13 | Stop reason: SDUPTHER

## 2022-07-15 NOTE — PROGRESS NOTES
"[facility]      Chief Complaint:   Chief Complaint   Patient presents with   • Hida Scan Performed 06/08/2022   • Gastric Emptying Study Performed 06/13/2022   • EGD Performed 07/05/2022   • 4 Week Clinical Appointment      Other Dysphagia    Right Upper Quadrant Abdominal Pain    Chronic Idiopathic Constipation       Subjective     HPI:   Patient for follow-up after EGD.  Patient states that her swallowing is markedly improved since EGD with dilatation.  Patient did have a gastric emptying study which was normal and patient had a HIDA scan which was normal.  Patient is on a proton pump inhibitor and H2 blocker.    Plan; we will continue current medications.  Let patient follow-up in 3 months    Past Medical History:   Past Medical History:   Diagnosis Date   • Anesthesia complication     states she \"stopped breathing during her last procedure\"   • Arthritis    • Asthma    • Elevated cholesterol    • Endometriosis    • Falls    • GERD (gastroesophageal reflux disease)    • Osteoporosis    • Pulmonary fibrosis (HCC)    • Scoliosis of thoracolumbar spine    • Seasonal rhinitis    • Sinusitis        Past Surgical History:    Past Surgical History:   Procedure Laterality Date   • APPENDECTOMY     • COLONOSCOPY  07/05/2022    Per Dr. Evan Chavez M.D., [facility], [place], [place].   • COLONOSCOPY N/A 06/30/2020    Procedure: COLONOSCOPY;  Surgeon: Evan Chavez MD;  Location: [facility] ENDOSCOPY;  Service: Gastroenterology;  Laterality: N/A;   • ENDOSCOPY N/A 10/16/2019    Procedure: ESOPHAGOGASTRODUODENOSCOPY;  Surgeon: Evan Chavez MD;  Location: [facility] ENDOSCOPY;  Service: Gastroenterology   • ENDOSCOPY N/A 06/30/2020    Procedure: ESOPHAGOGASTRODUODENOSCOPY;  Surgeon: Evan Chavez MD;  Location: [facility] ENDOSCOPY;  Service: Gastroenterology;  Laterality: N/A;  savory dilation 48-54   • ENDOSCOPY N/A 11/17/2021    Procedure: ESOPHAGOGASTRODUODENOSCOPY;  Surgeon: " Evan Rondon MD;  Location: [facility] ENDOSCOPY;  Service: Gastroenterology;  Laterality: N/A;  48-54 f eso dil. blue   • FOREARM SURGERY     • HYSTERECTOMY  1984    total   • SALPINGO OOPHORECTOMY     • UPPER GASTROINTESTINAL ENDOSCOPY  10/16/2019   • UPPER GASTROINTESTINAL ENDOSCOPY  06/30/2020   • UPPER GASTROINTESTINAL ENDOSCOPY  11/17/2021   • WRIST FRACTURE SURGERY Right        Family History:  Family History   Problem Relation Age of Onset   • Heart failure Mother    • Thyroid disease Mother    • Ulcerative colitis Father        Social History:   reports that she quit smoking about 20 years ago. Her smoking use included cigarettes. She started smoking about 40 years ago. She has a 50.00 pack-year smoking history. She has never used smokeless tobacco. She reports previous alcohol use. She reports that she does not use drugs.    Medications:   Prior to Admission medications    Medication Sig Start Date End Date Taking? Authorizing Provider   acetaminophen-codeine (TYLENOL #3) 300-30 MG per tablet Take 1 tablet by mouth 2 (Two) Times a Day As Needed for Moderate Pain .   Yes Roosevelt Bryant MD   albuterol sulfate HFA (Ventolin HFA) 108 (90 Base) MCG/ACT inhaler Inhale 2 puffs Every 4 (Four) Hours As Needed for Wheezing or Shortness of Air. 7/11/22  Yes Mariah De Jesus DO   Biotin 1 MG capsule Take 1 capsule by mouth Daily.   Yes ProviderRoosevelt MD   Budeson-Glycopyrrol-Formoterol (BREZTRI) 160-9-4.8 MCG/ACT aerosol inhaler Inhale 2 puffs 2 (Two) Times a Day. 7/14/22  Yes Mariah De Jesus DO   Calcium-Magnesium-Vitamin D - MG-MG-UNIT tablet sustained-release 24 hour Take 2 tablets by mouth Daily.   Yes ProviderRoosevelt MD   Cranberry 500 MG tablet Take 500 mg by mouth 2 (two) times a day.   Yes Roosevelt Bryant MD   dicyclomine (BENTYL) 20 MG tablet Take 1 tablet by mouth Every 6 (Six) Hours. 9/1/21  Yes Evan Rondon MD   famotidine (PEPCID) 20 MG tablet Take 1 tablet by  mouth Daily. 7/15/22  Yes Evan Rondon MD   fexofenadine (Allegra Allergy) 180 MG tablet Take 1 tablet by mouth Daily. 12/16/21  Yes Mariah De Jesus DO   fluticasone (FLONASE) 50 MCG/ACT nasal spray 2 sprays into each nostril daily 7/1/21  Yes Mariah De Jesus DO   ipratropium-albuterol (DUO-NEB) 0.5-2.5 mg/3 ml nebulizer Take 3 mL by nebulization Every 4 (Four) Hours As Needed for Wheezing or Shortness of Air. 9/24/21  Yes Mariah De Jesus DO   montelukast (SINGULAIR) 10 MG tablet Take 10 mg by mouth Daily. 3/1/22  Yes Roosevelt Bryant MD   olopatadine (PATANASE) 0.6 % solution nasal solution 2 sprays by Each Nare route 2 (Two) Times a Day. 7/1/21  Yes Mariah De Jesus DO   pantoprazole (PROTONIX) 40 MG EC tablet Take 1 tablet by mouth Daily. 7/15/22  Yes Evan Rondon MD   Potassium Iodide, Expectorant, (SSKI) 1 GM/ML solution Take 0.6 mL by mouth 3 (Three) Times a Day. 12/2/20  Yes Jorgito Evans MD   predniSONE (DELTASONE) 10 MG tablet Take 40mg PO daily x 3d, then 30mg PO x 3d, then 20mg PO x 2d, then 10mg PO x 2d then stop 7/11/22  Yes Mariah De Jesus DO   vitamin D3 125 MCG (5000 UT) capsule capsule Take 5,000 Units by mouth Daily. Takes 2 daily   Yes Provider, MD Roosevelt   famotidine (PEPCID) 20 MG tablet TAKE ONE TABLET BY MOUTH DAILY 4/19/22 7/15/22 Yes Evan Rondon MD   pantoprazole (PROTONIX) 40 MG EC tablet TAKE ONE TABLET BY MOUTH DAILY 6/28/22 7/15/22 Yes Evan Rondon MD       Allergies:  Sulfa antibiotics, Levaquin [levofloxacin], Augmentin [amoxicillin-pot clavulanate], and Cefuroxime    ROS:    Review of Systems   Constitutional: Negative for activity change, appetite change, chills, diaphoresis, fatigue, fever and unexpected weight change.   HENT: Negative for sore throat and trouble swallowing.    Respiratory: Negative for shortness of breath.    Gastrointestinal: Negative for abdominal distention, abdominal pain, anal bleeding, blood in stool, constipation,  "diarrhea, nausea, rectal pain and vomiting.   Endocrine: Negative for polydipsia, polyphagia and polyuria.   Genitourinary: Negative for difficulty urinating.   Musculoskeletal: Negative for arthralgias.   Skin: Negative for pallor.   Allergic/Immunologic: Negative for food allergies.   Neurological: Negative for weakness and light-headedness.   Psychiatric/Behavioral: Negative for behavioral problems.     Objective     Blood pressure 104/74, pulse 93, height 166.4 cm (65.5\"), weight 77.7 kg (171 lb 3.2 oz).    Physical Exam  Constitutional:       General: She is not in acute distress.     Appearance: She is well-developed. She is not diaphoretic.   HENT:      Head: Normocephalic and atraumatic.   Cardiovascular:      Rate and Rhythm: Normal rate and regular rhythm.      Heart sounds: Normal heart sounds. No murmur heard.    No friction rub. No gallop.   Pulmonary:      Effort: No respiratory distress.      Breath sounds: Normal breath sounds. No wheezing or rales.   Chest:      Chest wall: No tenderness.   Abdominal:      General: Bowel sounds are normal. There is no distension.      Palpations: Abdomen is soft. There is no mass.      Tenderness: There is no abdominal tenderness. There is no guarding or rebound.      Hernia: No hernia is present.   Musculoskeletal:         General: Normal range of motion.   Skin:     General: Skin is warm and dry.      Coloration: Skin is not pale.      Findings: No erythema or rash.   Neurological:      Mental Status: She is alert and oriented to person, place, and time.   Psychiatric:         Behavior: Behavior normal.         Thought Content: Thought content normal.         Judgment: Judgment normal.          Assessment & Plan   Diagnoses and all orders for this visit:    1. Other dysphagia (Primary)    Other orders  -     famotidine (PEPCID) 20 MG tablet; Take 1 tablet by mouth Daily.  Dispense: 30 tablet; Refill: 3  -     pantoprazole (PROTONIX) 40 MG EC tablet; Take 1 tablet " by mouth Daily.  Dispense: 30 tablet; Refill: 5        * Surgery not found *     Diagnosis Plan   1. Other dysphagia         Anticipated Surgical Procedure:  No orders of the defined types were placed in this encounter.      The risks, benefits, and alternatives of this procedure have been discussed with the patient or the responsible party- the patient understands and agrees to proceed.

## 2022-07-20 ENCOUNTER — HOSPITAL ENCOUNTER (OUTPATIENT)
Dept: CT IMAGING | Facility: HOSPITAL | Age: 67
Discharge: HOME | End: 2022-07-20
Admitting: INTERNAL MEDICINE

## 2022-07-20 DIAGNOSIS — R05.3 CHRONIC COUGH: ICD-10-CM

## 2022-07-20 PROCEDURE — 71250 CT THORAX DX C-: CPT

## 2022-07-21 ENCOUNTER — OFFICE VISIT (OUTPATIENT)
Dept: FAMILY MEDICINE CLINIC | Facility: CLINIC | Age: 67
End: 2022-07-21

## 2022-07-21 VITALS
BODY MASS INDEX: 27.13 KG/M2 | TEMPERATURE: 97.7 F | WEIGHT: 168.8 LBS | SYSTOLIC BLOOD PRESSURE: 114 MMHG | RESPIRATION RATE: 20 BRPM | HEIGHT: 66 IN | HEART RATE: 90 BPM | DIASTOLIC BLOOD PRESSURE: 80 MMHG | OXYGEN SATURATION: 98 %

## 2022-07-21 DIAGNOSIS — G89.29 CHRONIC BILATERAL LOW BACK PAIN, UNSPECIFIED WHETHER SCIATICA PRESENT: Primary | ICD-10-CM

## 2022-07-21 DIAGNOSIS — M54.50 CHRONIC BILATERAL LOW BACK PAIN, UNSPECIFIED WHETHER SCIATICA PRESENT: Primary | ICD-10-CM

## 2022-07-21 DIAGNOSIS — Z79.899 LONG-TERM USE OF HIGH-RISK MEDICATION: ICD-10-CM

## 2022-07-21 DIAGNOSIS — E66.3 OVERWEIGHT WITH BODY MASS INDEX (BMI) OF 27 TO 27.9 IN ADULT: ICD-10-CM

## 2022-07-21 DIAGNOSIS — R21 RASH AND NONSPECIFIC SKIN ERUPTION: ICD-10-CM

## 2022-07-21 PROCEDURE — 99214 OFFICE O/P EST MOD 30 MIN: CPT | Performed by: NURSE PRACTITIONER

## 2022-07-21 PROCEDURE — 80306 DRUG TEST PRSMV INSTRMNT: CPT | Performed by: NURSE PRACTITIONER

## 2022-07-21 RX ORDER — ACETAMINOPHEN AND CODEINE PHOSPHATE 300; 30 MG/1; MG/1
1 TABLET ORAL 2 TIMES DAILY PRN
Qty: 60 TABLET | Refills: 0 | Status: SHIPPED | OUTPATIENT
Start: 2022-07-21

## 2022-07-21 RX ORDER — TRIAMCINOLONE ACETONIDE 0.25 MG/G
1 OINTMENT TOPICAL 2 TIMES DAILY
Qty: 30 G | Refills: 0 | Status: SHIPPED | OUTPATIENT
Start: 2022-07-21 | End: 2022-10-28

## 2022-07-21 NOTE — PATIENT INSTRUCTIONS
MyPlate from USDA    MyPlate is an outline of a general healthy diet based on the 2010 Dietary Guidelines for Americans, from the U.S. Department of Agriculture (USDA). It sets guidelines for how To follow MyPlate recommendations:  · Eat a wide variety of fruits and vegetables, grains, and protein foods.  · Serve smaller portions and eat less food throughout the day.  · Limit portion sizes to avoid overeating.  · Enjoy your food.  · Get at least 150 minutes of exercise every week. This is about 30 minutes each day, 5 or more days per week.  It can be difficult to have every meal look like MyPlate. Think about MyPlate as eating guidelines for an entire day, rather than each individual meal.  Fruits and vegetables  · Make half of your plate fruits and vegetables.  · Eat many different colors of fruits and vegetables each day.  · For a 2,000 calorie daily food plan, eat:  ? 2½ cups of vegetables every day.  ? 2 cups of fruit every day.  · 1 cup is equal to:  ? 1 cup raw or cooked vegetables.  ? 1 cup raw fruit.  ? 1 medium-sized orange, apple, or banana.  ? 1 cup 100% fruit or vegetable juice.  ? 2 cups raw leafy greens, such as lettuce, spinach, or kale.  ? ½ cup dried fruit.  Grains  · One fourth of your plate should be grains.  · Make at least half of the grains you eat each day whole grains.  · For a 2,000 calorie daily food plan, eat 6 oz of grains every day.  · 1 oz is equal to:  ? 1 slice bread.  ? 1 cup cereal.  ? ½ cup cooked rice, cereal, or pasta.  Protein  · One fourth of your plate should be protein.  · Eat a wide variety of protein foods, including meat, poultry, fish, eggs, beans, nuts, and tofu.  · For a 2,000 calorie daily food plan, eat 5½ oz of protein every day.  · 1 oz is equal to:  ? 1 oz meat, poultry, or fish.  ? ¼ cup cooked beans.  ? 1 egg.  ? ½ oz nuts or seeds.  ? 1 Tbsp peanut butter.  Dairy  · Drink fat-free or low-fat (1%) milk.  · Eat or drink dairy as a side to meals.  · For a 2,000  calorie daily food plan, eat or drink 3 cups of dairy every day.  · 1 cup is equal to:  ? 1 cup milk, yogurt, cottage cheese, or soy milk (soy beverage).  ? 2 oz processed cheese.  ? 1½ oz natural cheese.  Fats, oils, salt, and sugars  · Only small amounts of oils are recommended.  · Avoid foods that are high in calories and low in nutritional value (empty calories), like foods high in fat or added sugars.  · Choose foods that are low in salt (sodium). Choose foods that have less than 140 milligrams (mg) of sodium per serving.  · Drink water instead of sugary drinks. Drink enough water each day to keep your urine pale yellow.  Where to find support  · Work with your health care provider or a nutrition specialist (dietitian) to develop a customized eating plan that is right for you.  · Download an jessica (mobile application) to help you track your daily food intake.  Where to find more information  · Go to ChooseMyPlate.gov for more information.  Summary  · MyPlate is a general guideline for healthy eating from the USDA. It is based on the 2010 Dietary Guidelines for Americans.  · In general, fruits and vegetables should take up ½ of your plate, grains should take up ¼ of your plate, and protein should take up ¼ of your plate.  This information is not intended to replace advice given to you by your health care provider. Make sure you discuss any questions you have with your health care provider.  Document Revised: 05/21/2020 Document Reviewed: 03/19/2018  [employer] Patient Education © 2021 [employer]

## 2022-07-21 NOTE — PROGRESS NOTES
"Chief Complaint  Back Pain and Rash    Subjective    History of Present Illness {CC  Problem List  Visit  Diagnosis   Encounters  Notes  Medications  Labs  Result Review Imaging  Media :23}     Genna Garcia presents to [facility] [place] for   C/o intermittent left sided back pain x years - review of records indicates Tspine and L spine xray performed 8/17/2018 that showed multilevel degenerative changes - denies N/V/D/C, dysuria or fever. Also c/o \"red dot\" on side of neck near jaw that has been present since last week of June - tried OTC hydrocortisone cream that improved sx minimally but returned - never completely resolved. Voices no other c/o or concerns today.        Objective     Physical Exam  Vitals and nursing note reviewed.   Constitutional:       General: She is not in acute distress.     Appearance: Normal appearance. She is normal weight. She is not ill-appearing.   HENT:      Head: Normocephalic and atraumatic.   Eyes:      Conjunctiva/sclera: Conjunctivae normal.      Pupils: Pupils are equal, round, and reactive to light.   Neck:      Vascular: No carotid bruit.        Comments: Single round erythematous macule - rough surface - mild surrounding erythema - no apparent drainage  Cardiovascular:      Rate and Rhythm: Normal rate and regular rhythm.      Heart sounds: Normal heart sounds.   Pulmonary:      Effort: Pulmonary effort is normal.      Breath sounds: Normal breath sounds. No wheezing or rhonchi.   Musculoskeletal:         General: Tenderness (left sided throacic/lumbar back pain) present. Normal range of motion.      Cervical back: Normal range of motion and neck supple.   Lymphadenopathy:      Cervical: No cervical adenopathy.   Skin:     General: Skin is warm and dry.      Findings: Erythema and rash (right side of neck at jaw line - single lesion) present.   Neurological:      General: No focal deficit present.      " "Mental Status: She is alert and oriented to person, place, and time.   Psychiatric:         Mood and Affect: Mood normal.         Behavior: Behavior normal.        Result Review  Data Reviewed:{ Labs  Result Review  Imaging  Med Tab  Media :23}   The following data was reviewed by (Optional):[id number]}  Common labs    Common Labsle 1/18/22 1/18/22 1/18/22 1/18/22 3/28/22    0937 0937 0937 0937    Glucose   89  88   BUN   10  9   Creatinine   0.77  0.78   eGFR Non African Am   75     Sodium   140  141   Potassium   4.0  4.3   Chloride   101  102   Calcium   9.5  8.9   Albumin   4.20  4.00   Total Bilirubin   0.4  0.4   Alkaline Phosphatase   114  195 (A)   AST (SGOT)   22  51 (A)   ALT (SGPT)   13  76 (A)   WBC 8.30       Hemoglobin 14.3       Hematocrit 43.2       Platelets 302       Total Cholesterol  217 (A)      Triglycerides  134      HDL Cholesterol  41      LDL Cholesterol   152 (A)      Hemoglobin A1C    6.07 (A)    (A) Abnormal value            NM HIDA SCAN WITHOUT PHARMACOLOGICAL INTERVENTION    Result Date: 6/8/2022  Conclusion: Gallbladder ejection fraction of 87%. [id number] Electronically signed by:  Yohannes Royal MD  6/8/2022 10:33 PM CDT Workstation: [id number]    NM Gastric Emptying    Result Date: 6/13/2022  Normal gastric imaging study. No evidence for gastroparesis. Electronically signed by:  Riki Lai MD  6/13/2022 12:17 PM CDT Workstation: [id number]       Vital Signs:   /80 (BP Location: Left arm, Patient Position: Sitting, Cuff Size: Adult)   Pulse 90   Temp 97.7 °F (36.5 °C) (Temporal)   Resp 20   Ht 166.4 cm (65.5\")   Wt 76.6 kg (168 lb 12.8 oz)   SpO2 98%   BMI 27.66 kg/m²          Assessment and Plan {CC Problem List  Visit Diagnosis  ROS  Review (Popup)  Health Maintenance  Quality  BestPractice  Medications  SmartSets  SnapShot Encounters  Media :23}   Problem List Items Addressed This Visit        Allergies and Adverse Reactions    Long-term use of high-risk " medication    Relevant Orders    Urine Drug Screen - Urine, Clean Catch (Completed)       Musculoskeletal and Injuries    Chronic bilateral low back pain - Primary    Relevant Medications    acetaminophen-codeine (TYLENOL #3) 300-30 MG per tablet       Other    Overweight with body mass index (BMI) of 27 to 27.9 in adult      Other Visit Diagnoses     Rash and nonspecific skin eruption        Relevant Medications    mupirocin (BACTROBAN) 2 % ointment    triamcinolone (KENALOG) 0.025 % ointment         Diagnosis Plan   1. Chronic bilateral low back pain, unspecified whether sciatica present  acetaminophen-codeine (TYLENOL #3) 300-30 MG per tablet   2. Long-term use of high-risk medication  Urine Drug Screen - Urine, Clean Catch    Urine Drug Screen - Urine, Clean Catch   3. Rash and nonspecific skin eruption  mupirocin (BACTROBAN) 2 % ointment    triamcinolone (KENALOG) 0.025 % ointment   4. Overweight with body mass index (BMI) of 27 to 27.9 in adult       - Chronic LBP - Meng reviewed with no entries noted. UDS ordered - drug contract signed. RX for Tylenol #3 submitted - take PRN as directed.   - Rash - RX for mupirocin and triamcinolone topical submitted - use as directed - if rash persists will refer to dermatology.   - Body mass index is 27.66 kg/m². BMI is >= 25 and <30. (Overweight) The following options were offered after discussion;: weight loss educational material (shared in after visit summary)   - RTC at earliest convenience for AWV and 6 mos for regular f/u or PRN    Follow Up {Instructions Charge Capture  Follow-up Communications :23}   Return in 6 months (on 1/21/2023), or if symptoms worsen or fail to improve, for Medicare Wellness, Recheck.  Patient was given instructions and counseling regarding her condition or for health maintenance advice. Please see specific information pulled into the AVS if appropriate            .  This document has been electronically signed by MARCELLA Haines on  July 25, 2022 22:39 CDT

## 2022-07-22 LAB
AMPHET+METHAMPHET UR QL: NEGATIVE
AMPHETAMINES UR QL: NEGATIVE
BARBITURATES UR QL SCN: NEGATIVE
BENZODIAZ UR QL SCN: NEGATIVE
BUPRENORPHINE SERPL-MCNC: NEGATIVE NG/ML
CANNABINOIDS SERPL QL: NEGATIVE
COCAINE UR QL: NEGATIVE
METHADONE UR QL SCN: NEGATIVE
OPIATES UR QL: NEGATIVE
OXYCODONE UR QL SCN: NEGATIVE
PCP UR QL SCN: NEGATIVE
PROPOXYPH UR QL: NEGATIVE
TRICYCLICS UR QL SCN: NEGATIVE

## 2022-07-25 PROBLEM — M54.50 CHRONIC BILATERAL LOW BACK PAIN: Status: ACTIVE | Noted: 2022-07-25

## 2022-07-25 PROBLEM — G89.29 CHRONIC BILATERAL LOW BACK PAIN: Status: ACTIVE | Noted: 2022-07-25

## 2022-07-29 ENCOUNTER — PROCEDURE VISIT (OUTPATIENT)
Dept: PULMONOLOGY | Facility: CLINIC | Age: 67
End: 2022-07-29

## 2022-07-29 ENCOUNTER — OFFICE VISIT (OUTPATIENT)
Dept: PULMONOLOGY | Facility: CLINIC | Age: 67
End: 2022-07-29

## 2022-07-29 VITALS
HEIGHT: 66 IN | SYSTOLIC BLOOD PRESSURE: 114 MMHG | OXYGEN SATURATION: 99 % | WEIGHT: 168 LBS | HEART RATE: 92 BPM | BODY MASS INDEX: 27 KG/M2 | DIASTOLIC BLOOD PRESSURE: 84 MMHG

## 2022-07-29 DIAGNOSIS — J45.40 MODERATE PERSISTENT ASTHMA WITHOUT COMPLICATION: ICD-10-CM

## 2022-07-29 DIAGNOSIS — R05.3 CHRONIC COUGH: ICD-10-CM

## 2022-07-29 DIAGNOSIS — Z87.19 HISTORY OF ESOPHAGEAL STRICTURE: ICD-10-CM

## 2022-07-29 DIAGNOSIS — J41.0 SIMPLE CHRONIC BRONCHITIS: Primary | ICD-10-CM

## 2022-07-29 PROCEDURE — 94060 EVALUATION OF WHEEZING: CPT | Performed by: INTERNAL MEDICINE

## 2022-07-29 PROCEDURE — 94727 GAS DIL/WSHOT DETER LNG VOL: CPT | Performed by: INTERNAL MEDICINE

## 2022-07-29 PROCEDURE — 94729 DIFFUSING CAPACITY: CPT | Performed by: INTERNAL MEDICINE

## 2022-07-29 PROCEDURE — 99214 OFFICE O/P EST MOD 30 MIN: CPT | Performed by: INTERNAL MEDICINE

## 2022-07-29 RX ORDER — BUDESONIDE, GLYCOPYRROLATE, AND FORMOTEROL FUMARATE 160; 9; 4.8 UG/1; UG/1; UG/1
2 AEROSOL, METERED RESPIRATORY (INHALATION) 2 TIMES DAILY
Qty: 1 EACH | Refills: 11 | Status: SHIPPED | OUTPATIENT
Start: 2022-07-29 | End: 2022-12-15 | Stop reason: SDUPTHER

## 2022-07-29 NOTE — PROGRESS NOTES
FULL PFT WITH BRONCHODILATOR PERFORMED.     GOOD PATIENT EFFORT AND COOPERATION.     6 SECOND EXHALATION ON SPIROMETRY, HOWEVER SEVERAL COUGHING EPISODES DURING INHALATION AND EXHALATION.     ORDERED BY DR. PIZARRO, READ BY DR. PIZARRO

## 2022-07-29 NOTE — PROGRESS NOTES
Pulmonary Office Follow-up    Subjective     Genna Garcia is seen today at the office for   Chief Complaint   Patient presents with   • moderate persistent asthma without complications         HPI  Genna Garcia is a 67 y.o. female with a PMH significant for bronciectasis.     7/29/22  Patinet here to follow up on multiple tests. Had CT chest that showed unchanged chronic fibrotic changes in the bases. PFTs today. Swallow study hasn't been done yet (scheduled for the 10th)  She has been doing much better on the Breztri than the Breo, fels like breathing is much better. She also does a breathing treatment before meals and that helps her to cough less as well      7/11/22  Patient here to follow up after EGD for esophageal dilation. She feels like she is swallowing better and having less chest pressure. Still coughing with eating though. She coughs other times too, but has more of a choking cough with eating. Does feel like the steroids helped as well.      6/14/22  Patient here for routine follow up. She reports that for the last few months, more chest proessure and trouble breahting. Neb treatments do help, she brings up more mucus with those. She is using Breo at night before bed.  She has been having more snoring lately and concerns about apneas  She has seen Dr Rondon, has an EGD scheduled for July 5th with plans for a dilation  She saw allergy and was started on Singulair. She was told she couldn't get skin testing done because she would have to stop all of her antihistamines for two weeks and she doesn't feel she can stop them for that long    12/16/21  Patient here for follow up. She is doing pretty well from a breathing standpoint. Has episodes of getting more winded. She notices it mostly when the weather is bad. She's still doing her Breo daily in the afternoon which helps. Her asthma seems well controlled. She does feel like her allergies are worse though. She used to get  allergy shots and had to stop when she broke her arm. She would like to see a new allergist about getting back on IT schedule  She saw GI and had an esophageal dilation again since last visit. She is eating better. However she still has coughing with eating    9/24/21  Patient here for follow up. At last visit I ordered her Symbicort but her insurance wouldn't cover it. She apparently got generic low dose Symbicort though, and this gave her severe headaches. Went back to Breo but taking it later in the day and that worked better  She continues to have coughing when she eats. She didn't discuss this with her GI doc at last appointment but she is going to see him again    Last OV 7/1/21  Last seen by Dr Evans in Feb  Patient with multiple overlapping issues going on. Essentially she has chronic productive cough from several sources. She has reflux and symptoms of aspiration (coughing with/after eating), and the lower lobe fibrosis on her Ct scan supports this. I can't find a swallow study anywhere. She has esophageal strictures that she has to have stretched periodically   She has allergies, previously on allergy shots but can't afford them now. She has seen ENT before, was going to have sinus surgery but held off for other medical reasons and now her ENT doctor has left. She has run out of her nasal sprays and Allegra.  She is on Breo and that helps for most of the morning but by late afternoon she is having wheezing and coughing again. She uses Duonebs and Combivent as needed    Tobacco use history:  Type: cigarettes  Amount: 2-3 ppd  Duration: 18 years  Cessation: 2002   Willing to quit: N/A      Patient Active Problem List   Diagnosis   • Scoliosis of thoracolumbar spine   • Other dysphagia   • Pain in joint of right shoulder   • Change in bowel habits   • Chronic cough   • Chronic allergic rhinitis   • Severe allergic reaction with respiratory distress   • Senile osteoporosis   • Gastroesophageal reflux disease  without esophagitis   • History of esophageal stricture   • History of esophageal dilatation   • Dysphagia   • History of tobacco abuse   • Postmenopausal   • Early menopause occurring in patient age younger than 45 years   • Irritable bowel syndrome   • Chronic obstructive pulmonary disease (HCC)   • Long-term use of high-risk medication   • Overweight with body mass index (BMI) of 27 to 27.9 in adult   • Chronic bilateral low back pain         Medications, Allergies, Social, and Family Histories reviewed as per EMR.    Objective     Vitals:    07/29/22 1326   BP: 114/84   Pulse: 92   SpO2: 99%         07/29/22  1326   Weight: 76.2 kg (168 lb)     [unfilled]  Physical Exam  Vitals reviewed.   Constitutional:       Appearance: Normal appearance.   HENT:      Head: Normocephalic and atraumatic.      Nose: Nose normal.      Mouth/Throat:      Mouth: Mucous membranes are moist.      Pharynx: Oropharynx is clear.   Eyes:      Conjunctiva/sclera: Conjunctivae normal.      Pupils: Pupils are equal, round, and reactive to light.   Cardiovascular:      Rate and Rhythm: Normal rate and regular rhythm.      Pulses: Normal pulses.      Heart sounds: Normal heart sounds.   Pulmonary:      Effort: Pulmonary effort is normal.      Breath sounds: Normal breath sounds.   Abdominal:      General: Abdomen is flat. Bowel sounds are normal.      Palpations: Abdomen is soft.   Musculoskeletal:         General: Normal range of motion.      Cervical back: Normal range of motion.   Skin:     General: Skin is warm and dry.   Neurological:      General: No focal deficit present.      Mental Status: She is alert and oriented to person, place, and time.   Psychiatric:         Mood and Affect: Mood normal.         Behavior: Behavior normal.               PFTs:  (independently reviewed and interpreted by me)  10/21/20  FVC 2.22L,  69%  FEV1 1.73L,  69%  Ratio 78%  TLC 2.77L,  53%  DLCO 15.66L,  61%  Variable effort.  Mild restriction. Mildly  reduced diffusing capacity. No comparative data available.    7/29/22  FVC 2.33L, 73%  FEV1 1.84L, 74%  Ratio 67%  +BDR  TLC 3.65L, 69%  DLCO 47%  Mild reversible obstruction and mild restriction, significant BDR, decreased lung volumes, moderate diffusion impairment     Radiology (independently reviewed and interpreted by me):   CT chest  5/20/2020 - small focus of nodularity posterior right upper lobe, lower lobe predominant interstitial opacity with air bronchograms and traction bronchiectasis, numerous calcified granulomas bilaterally, mildly enlarged mediastinal lymph nodes largest precarinal measuring 1.1 x 1.55 cm, small axillary and retropectoral lymph nodes largest measuring 8.5 mm, findings similar to 4/2/2016. Notable thoracic/lumbar scoliosis       Assessment & Plan     Diagnoses and all orders for this visit:    1. Simple chronic bronchitis (HCC) (Primary)    2. History of esophageal stricture    3. Chronic cough    Other orders  -     Budeson-Glycopyrrol-Formoterol (Breztri Aerosphere) 160-9-4.8 MCG/ACT aerosol inhaler; Inhale 2 puffs 2 (Two) Times a Day.  Dispense: 1 each; Refill: 11         Discussion/ Recommendations:   Patient doing much better on Breztri. She didn't take these this morning, which is likely why there is a BDR on her PFTs today. She can continue with the Breztri bid. I didn't see anything new or concerning on her CT. She does have some basilar fibrosis which may be causing cough, but there's nothing to do about it at this time. Will await her swallow study and see what that shows.    -continue with Breztri  -Swallow study- will call with results when I get back in the office    Return in about 3 months (around 10/29/2022) for asthma/COPD follow up.          This document has been electronically signed by Mariah De Jesus DO on July 29, 2022 13:53 CDT

## 2022-08-08 ENCOUNTER — OFFICE VISIT (OUTPATIENT)
Dept: SLEEP MEDICINE | Facility: HOSPITAL | Age: 67
End: 2022-08-08

## 2022-08-08 VITALS
WEIGHT: 171.9 LBS | SYSTOLIC BLOOD PRESSURE: 137 MMHG | HEART RATE: 94 BPM | DIASTOLIC BLOOD PRESSURE: 83 MMHG | HEIGHT: 66 IN | BODY MASS INDEX: 27.63 KG/M2 | OXYGEN SATURATION: 96 %

## 2022-08-08 DIAGNOSIS — G47.00 FREQUENT NOCTURNAL AWAKENING: ICD-10-CM

## 2022-08-08 DIAGNOSIS — G47.19 EXCESSIVE DAYTIME SLEEPINESS: ICD-10-CM

## 2022-08-08 DIAGNOSIS — R06.83 SNORING: Primary | ICD-10-CM

## 2022-08-08 PROCEDURE — 99213 OFFICE O/P EST LOW 20 MIN: CPT | Performed by: NURSE PRACTITIONER

## 2022-08-08 NOTE — PROGRESS NOTES
"New Patient Sleep Medicine Consultation    Encounter Date: 8/8/2022         Patient's PCP: Neena Kee APRN  Referring provider: Mariah De Jesus DO  Reason for consultation chief complaint: snoring, excessive daytime sleepiness, unrefreshing sleep and insomnia    Genna Garcia is a 67 y.o. female whose bedtime is ~ 2200. She  falls asleep after 20 minutes, and is up 6 times per night. Up to the bathroom. Able to fall back asleep quickly.  She wakes up ~ 0800. Every day of the week.  She endorses 6 hours of sleep. She drinks 0-1 cups of coffee, 0-1 teas, and 0 sodas per day. She drinks 0 alcoholic beverages per week.      Genna Garcia admits to snoring, unrestful sleep, gasping during sleep, decreased libido, stop breathing during sleep, Disturbed or restless sleep, memory loss, Up to the bathroom at night, night sweats, teeth grinding, difficulty falling asleep and difficulty staying asleep for >5 years.Worse in last several years.  She denies cataplexy, sleep paralysis, or hypnagogic hallucinations. She takes no medicine for sleep. She has no sleepiness with driving. She naps  Occasionally. Sometimes unintentionally when she sits down in her chair in the afternoon. She has never had a sleep study. She sleeps in a bed alone sometimes with cat.     She used to smoke, but has not smoked for years. Smoking history: smoked 2 ppds from age 26 until 46      Marital status:    Occupation: retired   Children: 1  FH of sleep disorders: not that she knows of       Past Medical History:   Diagnosis Date   • Anesthesia complication     states she \"stopped breathing during her last procedure\"   • Arthritis    • Asthma    • Elevated cholesterol    • Endometriosis    • Falls    • GERD (gastroesophageal reflux disease)    • Osteoporosis    • Pulmonary fibrosis (HCC)    • Scoliosis of thoracolumbar spine    • Seasonal rhinitis    • Sinusitis      Social History     Socioeconomic History   • " "Marital status:    Tobacco Use   • Smoking status: Former Smoker     Packs/day: 2.50     Years: 20.00     Pack years: 50.00     Types: Cigarettes     Start date:      Quit date:      Years since quittin.6   • Smokeless tobacco: Never Used   Vaping Use   • Vaping Use: Never used   Substance and Sexual Activity   • Alcohol use: Not Currently     Comment: none in 40 years   • Drug use: Never   • Sexual activity: Defer     Family History   Problem Relation Age of Onset   • Heart failure Mother    • Thyroid disease Mother    • Ulcerative colitis Father          Review of Systems:  Constitutional: negative  Eyes: negative  Ears, nose, mouth, throat, and face: negative  Respiratory: negative  Cardiovascular: negative  Gastrointestinal: negative  Genitourinary:negative  Integument/breast: negative  Hematologic/lymphatic: negative  Musculoskeletal:negative  Neurological: negative  Behavioral/Psych: negative  Endocrine: negative  Allergic/Immunologic: negative Patient advised to discuss any positive ROS with PCP.      [place] - 10      Vitals:    22   BP: 137/83   Pulse: 94   SpO2: 96%           22   Weight: 78 kg (171 lb 14.4 oz)       Body mass index is 28.16 kg/m². BMI is >= 25 and <30. (Overweight) The following options were offered after discussion;: nutrition counseling/recommendations      Neck circumference: 15\"          General: Alert. Cooperative. Well developed. No acute distress.             Head:  Normocephalic. Symmetrical. Atraumatic.              Eyes: Sclera clear. No icterus. Normal EOM.             Ears: No deformities. Normal hearing.             Nose: No septal deviation. No drainage.          Throat: No oral lesions. No thrush. Moist mucous membranes. Trachea midline    Tongue is normal    Dentition is fair       Pharynx: Posterior pharyngeal pillars are narrow    Mallampati score of IV (only hard palate visible)    Pharynx is nonerythematous   Chest Wall: "  Normal shape. Symmetric expansion with respiration. No tenderness.          Lungs:  Clear to auscultation bilaterally. No wheezes. No rhonchi. No rales. Respirations regular, even and unlabored.            Heart:  Regular rhythm and normal rate. Normal S1 and S2. No murmur.  Extremities:  Moves all extremities well. No edema.               Skin: Dry. Intact. No bleeding. No rash.           Neuro: Moves all 4 extremities and cranial nerves grossly intact.  Psychiatric: Normal mood and affect.      Current Outpatient Medications:   •  acetaminophen-codeine (TYLENOL #3) 300-30 MG per tablet, Take 1 tablet by mouth 2 (Two) Times a Day As Needed for Moderate Pain ., Disp: 60 tablet, Rfl: 0  •  albuterol sulfate HFA (Ventolin HFA) 108 (90 Base) MCG/ACT inhaler, Inhale 2 puffs Every 4 (Four) Hours As Needed for Wheezing or Shortness of Air., Disp: 18 g, Rfl: 2  •  Biotin 1 MG capsule, Take 1 capsule by mouth Daily., Disp: , Rfl:   •  Budeson-Glycopyrrol-Formoterol (Breztri Aerosphere) 160-9-4.8 MCG/ACT aerosol inhaler, Inhale 2 puffs 2 (Two) Times a Day., Disp: 1 each, Rfl: 11  •  Calcium-Magnesium-Vitamin D - MG-MG-UNIT tablet sustained-release 24 hour, Take 2 tablets by mouth Daily., Disp: , Rfl:   •  Cranberry 500 MG tablet, Take 500 mg by mouth 2 (two) times a day., Disp: , Rfl:   •  dicyclomine (BENTYL) 20 MG tablet, Take 1 tablet by mouth Every 6 (Six) Hours., Disp: 120 tablet, Rfl: 5  •  famotidine (PEPCID) 20 MG tablet, Take 1 tablet by mouth Daily., Disp: 30 tablet, Rfl: 3  •  fexofenadine (Allegra Allergy) 180 MG tablet, Take 1 tablet by mouth Daily., Disp: 30 tablet, Rfl: 11  •  fluticasone (FLONASE) 50 MCG/ACT nasal spray, 2 sprays into each nostril daily, Disp: 16 mL, Rfl: 5  •  ipratropium-albuterol (DUO-NEB) 0.5-2.5 mg/3 ml nebulizer, Take 3 mL by nebulization Every 4 (Four) Hours As Needed for Wheezing or Shortness of Air., Disp: 360 mL, Rfl: 5  •  montelukast (SINGULAIR) 10 MG tablet, Take 10  mg by mouth Daily., Disp: , Rfl:   •  mupirocin (BACTROBAN) 2 % ointment, Apply 1 application topically to the appropriate area as directed 2 (Two) Times a Day., Disp: 30 g, Rfl: 0  •  olopatadine (PATANASE) 0.6 % solution nasal solution, 2 sprays by Each Nare route 2 (Two) Times a Day., Disp: 30 g, Rfl: 5  •  pantoprazole (PROTONIX) 40 MG EC tablet, Take 1 tablet by mouth Daily., Disp: 30 tablet, Rfl: 5  •  Potassium Iodide, Expectorant, (SSKI) 1 GM/ML solution, Take 0.6 mL by mouth 3 (Three) Times a Day., Disp: 15 mL, Rfl: 10  •  triamcinolone (KENALOG) 0.025 % ointment, Apply 1 application topically to the appropriate area as directed 2 (Two) Times a Day., Disp: 30 g, Rfl: 0  •  vitamin D3 125 MCG (5000 UT) capsule capsule, Take 5,000 Units by mouth Daily. Takes 2 daily, Disp: , Rfl:     Lab Results   Component Value Date    WBC 8.30 01/18/2022    HGB 14.3 01/18/2022    HCT 43.2 01/18/2022    MCV 82.6 01/18/2022     01/18/2022     Lab Results   Component Value Date    GLUCOSE 88 03/28/2022    CALCIUM 8.9 03/28/2022     03/28/2022    K 4.3 03/28/2022    CO2 29.0 03/28/2022     03/28/2022    BUN 9 03/28/2022    CREATININE 0.78 03/28/2022    EGFRIFNONA 75 01/18/2022    BCR 11.5 03/28/2022    ANIONGAP 10.0 03/28/2022     No results found for: INR, PROTIME  Lab Results   Component Value Date    TROPONINI <0.012 04/02/2016       No results found for: PHART, CAE3BOC, PO2ART]    Contraindications to home sleep test: none    Assessment and Plan:    1. Excessive daytime sleepiness-   1. Check HST  2. Good sleep hygiene   3. Drowsy driving tips- do not drive if feeling sleepy   4. RTC in 2 weeks with study results   2.   Snoring- New to me, additional work-up planned    1.   As above   3.   Frequent nocturnal awakening       I obtained a brief history from the patient, reviewed the medical problems and current medications, and made medical decisions regarding treatment based on that information.   I spent  24 minutes caring for Genna on this date of service. This time includes time spent by me in the following activities: preparing for the visit, obtaining and/or reviewing a separately obtained history, performing a medically appropriate examination and/or evaluation, counseling and educating the patient/family/caregiver, ordering medications, tests, or procedures and documenting information in the medical record. I answered all of her questions and she verbalized understanding. Discussion involved sleep apnea, health impact of sleep apnea, home sleep testing and follow-up.           RTC 2 weeks after study for results.             This document has been electronically signed by MARCELLA Jeronimo on August 8, 2022 09:31 CDT          This document has been electronically signed by MARCELLA Jeronimo on August 8, 2022         CC: Neena Kee, Mariah Joseph DO

## 2022-08-10 ENCOUNTER — HOSPITAL ENCOUNTER (OUTPATIENT)
Dept: GENERAL RADIOLOGY | Facility: HOSPITAL | Age: 67
Discharge: HOME | End: 2022-08-10
Admitting: INTERNAL MEDICINE

## 2022-08-10 DIAGNOSIS — R05.3 CHRONIC COUGH: ICD-10-CM

## 2022-08-10 PROCEDURE — 74230 X-RAY XM SWLNG FUNCJ C+: CPT

## 2022-08-10 PROCEDURE — 92611 MOTION FLUOROSCOPY/SWALLOW: CPT | Performed by: SPEECH-LANGUAGE PATHOLOGIST

## 2022-08-10 RX ADMIN — BARIUM SULFATE 15 ML: 960 POWDER, FOR SUSPENSION ORAL at 09:12

## 2022-08-10 NOTE — THERAPY EVALUATION
Speech Language Pathology   [facility] FEES / Discharge Summary  [facility]       Patient Name: Genna Garcia  : 1955  MRN: [id number]    Today's Date: 8/10/2022      Visit Date: 08/10/2022   SPEECH PATHOLOGY MODIFIED BARIUM SWALLOW STUDY     Chief Complaint:  Medical Diagnoses:  Past Medical History:  Allergies:   Present diet textures:    Views: Patient seated at 90 degrees in:    __lateral view    __A/P    Ability to follow instructions: __Excellent        __Good          __Fair  __Poor      Dentition: __Natural  __Dentures   __Edentulous         __Partials    Presence of Oral Motor Weakness:    The following consistencies were given, with symptoms as noted:  Consistency Method Labial Seal Oral Prep AP Transit Premature Spillage Delayed Swallow Reflex Laryngeal Penetration Slow progression of the bolus through UES immediate cough with the swallow    Thin 10ml  Cup rim      transient  yes    pudding  5ml spoon    yes Head of bolus at the valleculae   yes    Cracker 5ml With barium pudding  increased time delayed    yes yes    Impairments:  x__Premature loss of bolus  __Reduced velopharyngeal closure  x__Decreased lingual propulsion  __Reduced epiglottic inversion  __Decreased hyolaryngeal elevation  __Reduced laryngeal closure  __Reduced esophageal sphincter opening      Penetration-Aspiration Scale Rating:     Category Score Descriptions   No penetration or aspiration 1 Contrast does not enter the airway   Penetration 2 Contrast enters the airway, remains above vocal folds; no residue    3 Contrast remains above vocal folds; visible residue remains    4 Contrast contacts vocal folds; no residue    5 Contrast contacts vocal folds; visible residue remains   Aspiration 6 Contrast passes glottis; no subglottic residue visible    7 Contrast passes glottis; visible subglottic residue despite patient's response    8 Contrast passes glottis; visible subglottic residue; absent patient response  "    Cough/throat clear __Delayed X__Immediate after the swallow  Cough response:  x__Weak __Strong      Dysphagia Scale Rating:    Dysphagia Rating Scale Explanation   0   Normal swallowing mechanism     1 Minimal Dysphagia- video swallow shows slight deviance from a normal swallow. Patient may report change in sensation during swallow. No change in diet is required.     2 Mild Dysphagia- oropharyngeal dysphagia present, which can be managed by specific swallow suggestions. Slight modification in consistency of diet may be indicated.      3 Mild-Moderate Dysphagia- potential for aspiration exists but is diminished by specific swallow techniques and a modified diet. Time for eating is significantly increased; thus supplemental nutrition may be indicated.      4 Moderate Dysphagia- significant potential for aspiration exists. Trace aspiration of one or more consistencies may be seen under videofluoroscopy. Patient may eat certain consistencies by using specific techniques to minimize potential for aspiration and/or facilitate swallowing. Supervision at mealtimes required. May require supplemental nutrition orally or via feeding tube.      5 Moderately Severe Dysphagia- patient aspirates 5% to 10% on one or more consistencies, with potential for aspiration on all consistencies. Potential for aspiration minimized by specific swallow instructions. Cough reflex absent or non-protective. Alterative mode of feeding required to maintain patient's nutritional needs. If pulmonary status is compromised, \"nothing by mouth\" may be indicated.      6 Severe Dysphagia- more than 10% aspiration for all consistencies. \"Nothing by mouth\" recommended.            Visit Dx:     ICD-10-CM ICD-9-CM   1. Chronic cough  R05.3 786.2       Patient Active Problem List   Diagnosis   • Scoliosis of thoracolumbar spine   • Other dysphagia   • Pain in joint of right shoulder   • Change in bowel habits   • Chronic cough   • Chronic allergic rhinitis " "  • Severe allergic reaction with respiratory distress   • Senile osteoporosis   • Gastroesophageal reflux disease without esophagitis   • History of esophageal stricture   • History of esophageal dilatation   • Dysphagia   • History of tobacco abuse   • Postmenopausal   • Early menopause occurring in patient age younger than 45 years   • Irritable bowel syndrome   • Chronic obstructive pulmonary disease (HCC)   • Long-term use of high-risk medication   • Overweight with body mass index (BMI) of 27 to 27.9 in adult   • Chronic bilateral low back pain        Past Medical History:   Diagnosis Date   • Anesthesia complication     states she \"stopped breathing during her last procedure\"   • Arthritis    • Asthma    • Elevated cholesterol    • Endometriosis    • Falls    • GERD (gastroesophageal reflux disease)    • Osteoporosis    • Pulmonary fibrosis (HCC)    • Scoliosis of thoracolumbar spine    • Seasonal rhinitis    • Sinusitis         Past Surgical History:   Procedure Laterality Date   • APPENDECTOMY     • COLONOSCOPY  07/05/2022    Per Dr. Evan Chavez M.D., [facility], [place], [place].   • COLONOSCOPY N/A 06/30/2020    Procedure: COLONOSCOPY;  Surgeon: Evan Chavez MD;  Location: [facility] ENDOSCOPY;  Service: Gastroenterology;  Laterality: N/A;   • ENDOSCOPY N/A 10/16/2019    Procedure: ESOPHAGOGASTRODUODENOSCOPY;  Surgeon: Evan Chavez MD;  Location: [facility] ENDOSCOPY;  Service: Gastroenterology   • ENDOSCOPY N/A 06/30/2020    Procedure: ESOPHAGOGASTRODUODENOSCOPY;  Surgeon: Evan Chavez MD;  Location: [facility] ENDOSCOPY;  Service: Gastroenterology;  Laterality: N/A;  savory dilation 48-54   • ENDOSCOPY N/A 11/17/2021    Procedure: ESOPHAGOGASTRODUODENOSCOPY;  Surgeon: Evan Chavez MD;  Location: [facility] ENDOSCOPY;  Service: Gastroenterology;  Laterality: N/A;  48-54 f eso dil. blue   • ENDOSCOPY N/A 7/5/2022    Procedure: ESOPHAGOGASTRODUODENOSCOPY;  Surgeon: Evan Chavez MD;  " Location: [facility] ENDOSCOPY;  Service: Gastroenterology;  Laterality: N/A;   • FOREARM SURGERY     • HYSTERECTOMY  1984    total   • SALPINGO OOPHORECTOMY     • UPPER GASTROINTESTINAL ENDOSCOPY  10/16/2019   • UPPER GASTROINTESTINAL ENDOSCOPY  06/30/2020   • UPPER GASTROINTESTINAL ENDOSCOPY  11/17/2021   • WRIST FRACTURE SURGERY Right                   OP SLP Assessment/Plan - 08/10/22 0855        SLP Assessment    Functional Problems Swallowing  -EC    Impact on Function: Swallowing Impact on social aspects of eating  -EC    Clinical Impression: Swallowing Minimal:;pharyngeal phase dysphagia  -EC    Functional Problems Comment pt c/o cough and choking after eating. this problem is about 2 years old.  -EC    Clinical Impression Comments pt demonstrates throat clear immediately after the swallow of all consistancies.  there is no evidence of barium to indicate need for cough  -EC    SLP Diagnosis min pharyngeal dysphagia  -EC    Prognosis Good (comment)  -EC    Patient/caregiver participated in establishment of treatment plan and goals Yes  -EC    Patient would benefit from skilled therapy intervention No  -EC       SLP Plan    Frequency eval only  -EC    Plan Comments MBS eval only  -EC          User Key  (r) = Recorded By, (t) = Taken By, (c) = Cosigned By    Initials Name Provider Type    EC Emelyn Licona CCC-SLP Speech and Language Pathologist                 SLP Adult Swallow Evaluation     Row Name 08/10/22 0855       Rehab Evaluation    Document Type evaluation  -EC    Subjective Information complains of  chronic cough  -EC    Patient Observations alert;cooperative;agree to therapy  -EC    Patient/Family/Caregiver Comments/Observations seen in radiology  -EC    Patient Effort excellent  -EC    Symptoms Noted During/After Treatment other (see comments)  throat clear  -EC       General Information    Patient Profile Reviewed yes  -EC    Pertinent History Of Current Problem esophageal dilation on 7/5/2022.   c/o cough after eating.  productive cough  -EC    Current Method of Nutrition regular textures  -EC    Precautions/Limitations, Vision WFL with corrective lenses  -EC    Precautions/Limitations, Hearing WFL  -EC    Prior Level of Function-Communication WFL  -EC    Prior Level of Function-Swallowing no diet consistency restrictions  -EC    Plans/Goals Discussed with patient  -EC    Barriers to Rehab none identified  -EC    Patient's Goals for Discharge return home  -EC       Oral Motor Structure and Function    Dentition Assessment natural, present and adequate  -EC    Secretion Management WNL/WFL  -EC    Mucosal Quality moist, healthy  -EC    Gag Response WFL  -EC    Volitional Swallow WFL  -EC    Volitional Cough WFL  -EC       MBS/VFSS    Utensils Used cup  -EC    Consistencies Trialed thin liquids;pureed;regular textures  -EC       MBS/VFSS Interpretation    Oral Prep Phase WFL  -EC    Oral Transit Phase WFL  -EC    Oral Residue WFL  -EC    VFSS Summary transient penetration noted with thin liquid.  slow progression of cracker through the Upper esophageal sphinctor; ostophytes noted at C5-C7  -EC    Oral Phase, Comment increased time for oral prep noted, but functional.  -EC       Initiation of Pharyngeal Swallow    Initiation of Pharyngeal Swallow bolus in valleculae  for puree  -EC    Pharyngeal Phase functional pharyngeal phase of swallowing  -EC    Pharyngeal Phase, Comment transient penetration of thin liquids noted  -EC       SLP Evaluation Clinical Impression    SLP Swallowing Diagnosis swallow WFL  -EC    Functional Impact no impact on function  -EC    Rehab Potential/Prognosis, Swallowing good, to achieve stated therapy goals  -EC    Swallow Criteria for Skilled Therapeutic Interventions Met not appropriate  -EC       Recommendations    Therapy Frequency (Swallow) evaluation only  -EC    SLP Diet Recommendation regular textures;thin liquids  -EC    Recommended Precautions and Strategies reflux  precautions  -EC    Anticipated Discharge Disposition (SLP) home  -EC          User Key  (r) = Recorded By, (t) = Taken By, (c) = Cosigned By    Initials Name Provider Type    EC Emelyn Licona CCC-SLP Speech and Language Pathologist                                OP SLP Education     Row Name 08/10/22 0855       Education    Barriers to Learning No barriers identified  -EC    Education Provided Described results of evaluation;Patient expressed understanding of evaluation;Patient participated in establishing goals and treatment plan;Patient demonstrated recommended strategies  -EC    Assessed Learning motivation;Learning needs;Learning preferences;Learning readiness  -EC    Learning Motivation Strong  -EC    Learning Method Explanation  -EC    Teaching Response Verbalized understanding  -EC    Education Comments used video to show swallow function  -EC          User Key  (r) = Recorded By, (t) = Taken By, (c) = Cosigned By    Initials Name Effective Dates    EC Emelyn Licona CCC-SLP 06/16/21 -                                    Time Calculation:   Untimed Charges  SLP Eval/Re-eval : ST Motion Fluoro Eval Swallow - 46705  [id number]-TK Motion Fluoro Eval Swallow Minutes: 45  Total Minutes  Untimed Charges Total Minutes: 45   Total Minutes: 45    Therapy Charges for Today     Code Description Service Date Service Provider Modifiers Qty    29265469783 HC ST MOTION FLUORO EVAL SWALLOW 3 8/10/2022 Emelyn Licona CCC-SLP GN 1                  AYSHA Cerna  8/10/2022

## 2022-08-17 ENCOUNTER — TELEPHONE (OUTPATIENT)
Dept: FAMILY MEDICINE CLINIC | Facility: CLINIC | Age: 67
End: 2022-08-17

## 2022-08-18 ENCOUNTER — HOSPITAL ENCOUNTER (OUTPATIENT)
Dept: SLEEP MEDICINE | Facility: HOSPITAL | Age: 67
Discharge: HOME | End: 2022-08-18
Admitting: NURSE PRACTITIONER

## 2022-08-18 DIAGNOSIS — G47.19 EXCESSIVE DAYTIME SLEEPINESS: ICD-10-CM

## 2022-08-18 DIAGNOSIS — G47.00 FREQUENT NOCTURNAL AWAKENING: ICD-10-CM

## 2022-08-18 DIAGNOSIS — R06.83 SNORING: ICD-10-CM

## 2022-08-18 PROCEDURE — 95800 SLP STDY UNATTENDED: CPT

## 2022-08-18 PROCEDURE — 95800 SLP STDY UNATTENDED: CPT | Performed by: PSYCHIATRY & NEUROLOGY

## 2022-08-25 DIAGNOSIS — R21 RASH AND NONSPECIFIC SKIN ERUPTION: Primary | ICD-10-CM

## 2022-09-01 ENCOUNTER — OFFICE VISIT (OUTPATIENT)
Dept: SLEEP MEDICINE | Facility: HOSPITAL | Age: 67
End: 2022-09-01

## 2022-09-01 VITALS
HEART RATE: 86 BPM | OXYGEN SATURATION: 97 % | SYSTOLIC BLOOD PRESSURE: 144 MMHG | BODY MASS INDEX: 27.48 KG/M2 | DIASTOLIC BLOOD PRESSURE: 76 MMHG | HEIGHT: 66 IN | WEIGHT: 171 LBS

## 2022-09-01 DIAGNOSIS — Z11.59 SCREENING FOR VIRAL DISEASE: Primary | ICD-10-CM

## 2022-09-01 DIAGNOSIS — G47.33 OSA (OBSTRUCTIVE SLEEP APNEA): ICD-10-CM

## 2022-09-01 PROCEDURE — 99213 OFFICE O/P EST LOW 20 MIN: CPT | Performed by: NURSE PRACTITIONER

## 2022-09-01 RX ORDER — FLUTICASONE PROPIONATE 0.05 MG/G
OINTMENT TOPICAL
COMMUNITY
Start: 2022-08-31

## 2022-09-01 RX ORDER — VALACYCLOVIR HYDROCHLORIDE 1 G/1
TABLET, FILM COATED ORAL
COMMUNITY
Start: 2022-08-31 | End: 2022-09-13

## 2022-09-01 NOTE — PROGRESS NOTES
Sleep Clinic Follow-Up    CHIEF COMPLAINT: follow up sleep testing    LAST OV: 08/08/2022 (I reviewed this note)    HPI:  The patient is a 67 y.o. female.  I discussed the results of the recent home sleep study performed on 08/18/2022.  Total presumed sleep time was 6 hrs 18 minutes. The AHI/VILMA was 15. Mean O2 90% with a jose of 72%. Average pulse 78 BPM. Snoring present.       INTERVAL MEDICAL HISTORY: No change since last office visit in regard to the patient's bedtime routine, medications, or diagnosis.    PAP Data:  Currently not on therapy           MEDICATIONS:   Current Outpatient Medications:   •  acetaminophen-codeine (TYLENOL #3) 300-30 MG per tablet, Take 1 tablet by mouth 2 (Two) Times a Day As Needed for Moderate Pain ., Disp: 60 tablet, Rfl: 0  •  albuterol sulfate HFA (Ventolin HFA) 108 (90 Base) MCG/ACT inhaler, Inhale 2 puffs Every 4 (Four) Hours As Needed for Wheezing or Shortness of Air., Disp: 18 g, Rfl: 2  •  Biotin 1 MG capsule, Take 1 capsule by mouth Daily., Disp: , Rfl:   •  Budeson-Glycopyrrol-Formoterol (Breztri Aerosphere) 160-9-4.8 MCG/ACT aerosol inhaler, Inhale 2 puffs 2 (Two) Times a Day., Disp: 1 each, Rfl: 11  •  Calcium-Magnesium-Vitamin D - MG-MG-UNIT tablet sustained-release 24 hour, Take 2 tablets by mouth Daily., Disp: , Rfl:   •  Cranberry 500 MG tablet, Take 500 mg by mouth 2 (two) times a day., Disp: , Rfl:   •  dicyclomine (BENTYL) 20 MG tablet, Take 1 tablet by mouth Every 6 (Six) Hours., Disp: 120 tablet, Rfl: 5  •  famotidine (PEPCID) 20 MG tablet, Take 1 tablet by mouth Daily., Disp: 30 tablet, Rfl: 3  •  fexofenadine (Allegra Allergy) 180 MG tablet, Take 1 tablet by mouth Daily., Disp: 30 tablet, Rfl: 11  •  fluticasone (CUTIVATE) 0.005 % ointment, , Disp: , Rfl:   •  fluticasone (FLONASE) 50 MCG/ACT nasal spray, 2 sprays into each nostril daily, Disp: 16 mL, Rfl: 5  •  ipratropium-albuterol (DUO-NEB) 0.5-2.5 mg/3 ml nebulizer, Take 3 mL by nebulization  Every 4 (Four) Hours As Needed for Wheezing or Shortness of Air., Disp: 360 mL, Rfl: 5  •  montelukast (SINGULAIR) 10 MG tablet, Take 10 mg by mouth Daily., Disp: , Rfl:   •  mupirocin (BACTROBAN) 2 % ointment, Apply 1 application topically to the appropriate area as directed 2 (Two) Times a Day., Disp: 30 g, Rfl: 0  •  olopatadine (PATANASE) 0.6 % solution nasal solution, 2 sprays by Each Nare route 2 (Two) Times a Day., Disp: 30 g, Rfl: 5  •  pantoprazole (PROTONIX) 40 MG EC tablet, Take 1 tablet by mouth Daily., Disp: 30 tablet, Rfl: 5  •  Potassium Iodide, Expectorant, (SSKI) 1 GM/ML solution, Take 0.6 mL by mouth 3 (Three) Times a Day., Disp: 15 mL, Rfl: 10  •  triamcinolone (KENALOG) 0.025 % ointment, Apply 1 application topically to the appropriate area as directed 2 (Two) Times a Day., Disp: 30 g, Rfl: 0  •  valACYclovir (VALTREX) 1000 MG tablet, , Disp: , Rfl:   •  vitamin D3 125 MCG (5000 UT) capsule capsule, Take 5,000 Units by mouth Daily. Takes 2 daily, Disp: , Rfl:     PHYSICAL EXAM  Vitals:    09/01/22 0945   BP: 144/76   Pulse: 86   SpO2: 97%           09/01/22  0945   Weight: 77.6 kg (171 lb)       Body mass index is 28.01 kg/m². BMI is >= 25 and <30. (Overweight) The following options were offered after discussion;: nutrition counseling/recommendations      Gen:  No acute distress heard in voice, alert, oriented  Eyes:    Moist conjunctiva, EOMI   Lungs:  Normal effort, non-labored breathing, clear to auscultation bilaterally   Heart:  Normal S1/S2, no murmur , no lower extremity edema   Psych:  Appropriate affect   Neuro:  Cn2-12 appear intact     Assessment and Plan:    1. Obstructive sleep apnea -  1. CPAP titration study   2. COVID swab prior to study   3. Drowsy driving tips- do not drive if feeling sleepy   4. Return to clinic 2 weeks after study for results       The sleep results were discussed in detail with the patient.  The risks of untreated sleep apnea were reviewed.  Treatment options  for sleep apnea were discussed in detail. She is hesitant to pursue PAP therapy due to claustrophobia. Willing to undergo PAP titration.  I counseled patient on PAP management, maintenance, compliance, sleep hygiene, including regular sleep wake schedule and stimulus control therapy, the importance of safe driving and abstaining from smoking and alcohol consumption, as well as other sedatives.     An in lab PAP titration study has been ordered. The patient was educated regarding the recent safety recall of [employer] CPAP/BiPAP machines, and the continued usage of these machines at [facility]. The patient was informed of the safety precautions being used to decrease potential risks of machine usage during the titration study, including: in-line filter usage, and the implementation of single-use, disposable masks and tubing. While these steps do not completely eliminate the risk of potential exposure to particulates and emissions from the machine's sound absorbing foam, the health risks of untreated or poorly controlled sleep apnea outweigh the potential risk of particulate or emission exposure. The patient has made a well-informed decision to proceed with scheduling testing and has also been given additional printed safety information for their own viewing purposes.      All of the patient's questions were answered. She states understanding and agreement with my assessment and plan as above.     I obtained a brief history from the patient, reviewed the medical problems and current medications, and made medical decisions regarding treatment based on that information. Total visit time 27 min, with total of 20 minutes spent counseling patient regarding: PAP therapy, PAP compliance, PAP maintenance, Lifestyle modifications, Sleep hygiene, Study results and Further testing.       RTC 2 weeks after testing   She agrees to return sooner on a prn basis if sx change.           This  document has been electronically signed by MARCELLA Jeronimo on September 1, 2022 10:01 CDT            CC: Neena Kee APRN          No ref. provider found

## 2022-09-02 ENCOUNTER — TELEPHONE (OUTPATIENT)
Dept: PULMONOLOGY | Facility: CLINIC | Age: 67
End: 2022-09-02

## 2022-09-02 NOTE — TELEPHONE ENCOUNTER
Per Charlene NARANJO, patient notified that Maria Esther in sleep medicine is aware of her home sleep study results.  An order has been placed for her to sleep in the lab with a CPAP and if oxygen is needed it will be arranged.  Until she has the test, oxygen is not needed.  Ms. Garcia verbalized understanding.            ----- Message from MARCELLA Solo sent at 2022  1:08 PM CDT -----  Regarding: RE: call back  Contact: [phone]  I spoke to Maria Esther in sleep medicine. She is aware of the home sleep study results and has ordered her to sleep in lab with CPAP. IF o2 is needed then, it will be arranged. It is not needed now until her sleep apnea is treated.       ----- Message -----  From: Diane Townsend CSA  Sent: 2022  12:38 PM CDT  To: MARCELLA Solo  Subject: FW: call back                                    88 doesn't qualify for oxygen does it?   Please advise    ----- Message -----  From: Molly Middleton  Sent: 2022  11:22 AM CDT  To: Diane Townsend CSA  Subject: call back                                        Genna Garcia  59 stated she got her test scores from her sleep apna O2 was at 88, she have concerns about it. She wanted a call back @ [phone] to talk about this. Thgrayson

## 2022-09-13 ENCOUNTER — OFFICE VISIT (OUTPATIENT)
Dept: FAMILY MEDICINE CLINIC | Facility: CLINIC | Age: 67
End: 2022-09-13

## 2022-09-13 VITALS
OXYGEN SATURATION: 96 % | WEIGHT: 171 LBS | BODY MASS INDEX: 27.48 KG/M2 | TEMPERATURE: 97.6 F | SYSTOLIC BLOOD PRESSURE: 126 MMHG | DIASTOLIC BLOOD PRESSURE: 86 MMHG | HEIGHT: 66 IN | HEART RATE: 95 BPM

## 2022-09-13 DIAGNOSIS — R06.02 SHORTNESS OF BREATH: ICD-10-CM

## 2022-09-13 DIAGNOSIS — R05.9 COUGH: Primary | ICD-10-CM

## 2022-09-13 LAB
EXPIRATION DATE: NORMAL
FLUAV AG UPPER RESP QL IA.RAPID: NOT DETECTED
FLUBV AG UPPER RESP QL IA.RAPID: NOT DETECTED
INTERNAL CONTROL: NORMAL
Lab: NORMAL
QT INTERVAL: 358 MS
QTC INTERVAL: 442 MS
SARS-COV-2 AG UPPER RESP QL IA.RAPID: NOT DETECTED

## 2022-09-13 PROCEDURE — 93010 ELECTROCARDIOGRAM REPORT: CPT | Performed by: INTERNAL MEDICINE

## 2022-09-13 PROCEDURE — 99214 OFFICE O/P EST MOD 30 MIN: CPT | Performed by: NURSE PRACTITIONER

## 2022-09-13 PROCEDURE — 96372 THER/PROPH/DIAG INJ SC/IM: CPT | Performed by: NURSE PRACTITIONER

## 2022-09-13 PROCEDURE — 93005 ELECTROCARDIOGRAM TRACING: CPT | Performed by: NURSE PRACTITIONER

## 2022-09-13 PROCEDURE — 87428 SARSCOV & INF VIR A&B AG IA: CPT | Performed by: NURSE PRACTITIONER

## 2022-09-13 RX ORDER — BENZONATATE 100 MG/1
100 CAPSULE ORAL 3 TIMES DAILY PRN
Qty: 30 CAPSULE | Refills: 0 | Status: SHIPPED | OUTPATIENT
Start: 2022-09-13 | End: 2023-03-10 | Stop reason: SDUPTHER

## 2022-09-13 RX ORDER — DEXAMETHASONE SODIUM PHOSPHATE 10 MG/ML
10 INJECTION INTRAMUSCULAR; INTRAVENOUS ONCE
Status: COMPLETED | OUTPATIENT
Start: 2022-09-13 | End: 2022-09-13

## 2022-09-13 RX ADMIN — DEXAMETHASONE SODIUM PHOSPHATE 10 MG: 10 INJECTION INTRAMUSCULAR; INTRAVENOUS at 10:16

## 2022-09-13 NOTE — PROGRESS NOTES
Chief Complaint  Cough, Chills, Generalized Body Aches, Headache, and Nasal Congestion    Subjective    History of Present Illness {CC  Problem List  Visit  Diagnosis   Encounters  Notes  Medications  Labs  Result Review Imaging  Media :23}     Genna Garcia presents to [facility] [place] for   C/o cough, chills, body aches, HA and runny nose x 1 week - has chronic lung hx and sees pulmonology - current awaiting in house sleep study. Reports using her nebulizer every AM and typically it helps her chronic cough but over the past week there has been no improvement; AM productive cough that is clear then later in day changes to dry cough - denies fever. Has used ice packs to back of neck for HA; OTC acetaminophen for neck and back pain.     Cough  This is a new problem. The current episode started in the past 7 days. The problem has been gradually worsening. The cough is productive of sputum. Associated symptoms include chest pain, chills, headaches, nasal congestion, a sore throat and shortness of breath. Pertinent negatives include no ear congestion, ear pain, fever, weight loss or wheezing. Nothing aggravates the symptoms. She has tried a beta-agonist inhaler, leukotriene antagonists and body position changes for the symptoms. The treatment provided no relief. Her past medical history is significant for asthma, bronchiectasis, COPD and environmental allergies.   Chills  This is a new problem. The current episode started in the past 7 days. The problem has been gradually worsening. Associated symptoms include chest pain, chills, coughing, headaches and a sore throat. Pertinent negatives include no fever or urinary symptoms. Nothing aggravates the symptoms. She has tried rest for the symptoms. The treatment provided no relief.   Headache  Headache pattern:  Headache sometimes there, sometimes not at all  Initial event:  Illness  Other illness  details:  HA worse with cough       Objective     Physical Exam  Vitals and nursing note reviewed.   Constitutional:       General: She is not in acute distress.     Appearance: Normal appearance. She is normal weight. She is not ill-appearing.   HENT:      Head: Normocephalic and atraumatic.      Right Ear: Tympanic membrane, ear canal and external ear normal.      Left Ear: Tympanic membrane, ear canal and external ear normal.      Nose: Congestion present.      Right Sinus: Maxillary sinus tenderness present. No frontal sinus tenderness.      Left Sinus: Maxillary sinus tenderness present. No frontal sinus tenderness.      Mouth/Throat:      Mouth: Mucous membranes are moist.      Pharynx: Oropharynx is clear. Posterior oropharyngeal erythema (mild with purulent post nasal drainage present) present.   Eyes:      Conjunctiva/sclera: Conjunctivae normal.      Pupils: Pupils are equal, round, and reactive to light.   Neck:      Vascular: No carotid bruit.   Cardiovascular:      Rate and Rhythm: Normal rate and regular rhythm.      Heart sounds: Normal heart sounds.   Pulmonary:      Effort: Pulmonary effort is normal.      Breath sounds: Normal breath sounds. No wheezing or rhonchi.      Comments: Slightly diminished lung sounds in all fields - productive sounding cough present during deep breathing  Musculoskeletal:         General: Normal range of motion.      Cervical back: Normal range of motion and neck supple.   Lymphadenopathy:      Cervical: Cervical adenopathy present.   Skin:     General: Skin is warm and dry.   Neurological:      General: No focal deficit present.      Mental Status: She is alert and oriented to person, place, and time.      Motor: No weakness.      Gait: Gait normal.   Psychiatric:         Mood and Affect: Mood normal.         Behavior: Behavior normal.        Result Review  Data Reviewed:{ Labs  Result Review  Imaging  Med Tab  Media :23}   The following data was reviewed by  (Optional):[id number]}  Common labs    Common Labsle 1/18/22 1/18/22 1/18/22 1/18/22 3/28/22    0937 0937 0937 0937    Glucose   89  88   BUN   10  9   Creatinine   0.77  0.78   eGFR Non African Am   75     Sodium   140  141   Potassium   4.0  4.3   Chloride   101  102   Calcium   9.5  8.9   Albumin   4.20  4.00   Total Bilirubin   0.4  0.4   Alkaline Phosphatase   114  195 (A)   AST (SGOT)   22  51 (A)   ALT (SGPT)   13  76 (A)   WBC 8.30       Hemoglobin 14.3       Hematocrit 43.2       Platelets 302       Total Cholesterol  217 (A)      Triglycerides  134      HDL Cholesterol  41      LDL Cholesterol   152 (A)      Hemoglobin A1C    6.07 (A)    (A) Abnormal value            CT Chest Without Contrast Diagnostic    Result Date: 7/21/2022  Chronic changes stable compared with previous study. Evidence of previous granulomatous disease. No acute intrathoracic disease. Other findings as above. See body of report for full details. No acute intrathoracic disease. Electronically signed by:  Eloy Euceda MD  7/21/2022 11:43 AM CDT Workstation: [id number]    NM HIDA SCAN WITHOUT PHARMACOLOGICAL INTERVENTION    Result Date: 6/8/2022  Conclusion: Gallbladder ejection fraction of 87%. [id number] Electronically signed by:  Yohannes Royal MD  6/8/2022 10:33 PM CDT Workstation: [id number]    NM Gastric Emptying    Result Date: 6/13/2022  Normal gastric imaging study. No evidence for gastroparesis. Electronically signed by:  Riki Lai MD  6/13/2022 12:17 PM CDT Workstation: [id number]    FL Video Swallow With Speech Single Contrast    Result Date: 8/10/2022  CONCLUSION: Video fluoroscopic swallowing study performed in conjunction with speech pathology.  For further evaluation and dietary recommendations please see speech pathology note Electronically signed by:  Daniel Ferris MD  8/10/2022 1:21 PM CDT Workstation: [id number]    ECG 12 Lead   Preliminary Result   Test Reason : R06.02   Blood Pressure :   */*   mmHG   Vent. Rate :  92  "BPM     Atrial Rate :  92 BPM      P-R Int : 148 ms          QRS Dur :  74 ms       QT Int : 358 ms       P-R-T Axes :  28 -48  39 degrees      QTc Int : 442 ms      Normal sinus rhythm   Left anterior fascicular block   Inferior infarct (cited on or before 02-APR-2016)   Abnormal ECG   When compared with ECG of 02-APR-2016 13:39,   No significant change was found      Referred By: LAURE VILLANUEVA           Confirmed By:              Vital Signs:   /86 (BP Location: Left arm, Patient Position: Sitting, Cuff Size: Adult)   Pulse 95   Temp 97.6 °F (36.4 °C) (Temporal)   Ht 166.4 cm (65.51\")   Wt 77.6 kg (171 lb)   SpO2 96%   BMI 28.01 kg/m²          Assessment and Plan {CC Problem List  Visit Diagnosis  ROS  Review (Popup)  Health Maintenance  Quality  BestPractice  Medications  SmartSets  SnapShot Encounters  Media :23}   Problem List Items Addressed This Visit    None     Visit Diagnoses     Cough    -  Primary    Relevant Medications    dexamethasone (DECADRON) injection 10 mg (Completed) (Start on 9/13/2022 11:00 AM)    benzonatate (Tessalon Perles) 100 MG capsule    Other Relevant Orders    XR Chest PA & Lateral    POCT SARS-CoV-2 Antigen SIDDHARTHA + Flu (Completed)    Shortness of breath        Relevant Medications    dexamethasone (DECADRON) injection 10 mg (Completed) (Start on 9/13/2022 11:00 AM)    Other Relevant Orders    XR Chest PA & Lateral    ECG 12 Lead (Completed)    POCT SARS-CoV-2 Antigen SIDDHARTHA + Flu (Completed)         Diagnosis Plan   1. Cough  dexamethasone (DECADRON) injection 10 mg    XR Chest PA & Lateral    benzonatate (Tessalon Perles) 100 MG capsule    POCT SARS-CoV-2 Antigen SIDDHARTHA + Flu   2. Shortness of breath  dexamethasone (DECADRON) injection 10 mg    XR Chest PA & Lateral    ECG 12 Lead    POCT SARS-CoV-2 Antigen SIDDHARTHA + Flu     - Cough - worsening - IM dexamethasone given in office today - CXR ordered for further evaluation - will tx empirically with abx once CXR results " "reviewed d/t chronic lung hx and worsening sx - RX for benzonatate submitted - advised to only use when cough is severe or causing inability to sleep. Pt v/u.   - SOA -  EKG performed by MA in office today, \"NSR; L anterior fascicular block; inferior infarct, age undetermined; abnormal ECG\" sent to cardiology for confirmation. There does not appear to be any significant EKG change from EKG in 2014. Will await cardiology review for final confirmation.  - Advised to go to ER if cough with SOA worsens  - RTC at earliest convenience for AWV or PRN    Follow Up {Instructions Charge Capture  Follow-up Communications :23}   Return if symptoms worsen or fail to improve, for Next scheduled follow up.  Patient was given instructions and counseling regarding her condition or for health maintenance advice. Please see specific information pulled into the AVS if appropriate            .  This document has been electronically signed by MARCELLA Haines on September 13, 2022 10:51 CDT  "

## 2022-09-16 ENCOUNTER — TELEPHONE (OUTPATIENT)
Dept: FAMILY MEDICINE CLINIC | Facility: CLINIC | Age: 67
End: 2022-09-16

## 2022-09-16 DIAGNOSIS — J22 LOWER RESPIRATORY TRACT INFECTION: Primary | ICD-10-CM

## 2022-09-16 RX ORDER — DOXYCYCLINE HYCLATE 100 MG/1
100 CAPSULE ORAL 2 TIMES DAILY
Qty: 20 CAPSULE | Refills: 0 | Status: SHIPPED | OUTPATIENT
Start: 2022-09-16 | End: 2022-09-26

## 2022-09-16 NOTE — TELEPHONE ENCOUNTER
Called patient to let her know that Neena has called her in an antibiotic and that she is not to take it within two hours of taking the antibiotics

## 2022-09-16 NOTE — TELEPHONE ENCOUNTER
Patient called stated she is needing a call back please. She is needing a antibiotic called in. She is getting worse. She is now having nose bleeds.

## 2022-09-19 DIAGNOSIS — B37.9 ANTIBIOTIC-INDUCED YEAST INFECTION: Primary | ICD-10-CM

## 2022-09-19 DIAGNOSIS — T36.95XA ANTIBIOTIC-INDUCED YEAST INFECTION: Primary | ICD-10-CM

## 2022-09-19 RX ORDER — FLUCONAZOLE 150 MG/1
150 TABLET ORAL TAKE AS DIRECTED
Qty: 2 TABLET | Refills: 0 | Status: SHIPPED | OUTPATIENT
Start: 2022-09-19

## 2022-09-26 PROCEDURE — 87635 SARS-COV-2 COVID-19 AMP PRB: CPT | Performed by: FAMILY MEDICINE

## 2022-09-28 ENCOUNTER — LAB (OUTPATIENT)
Dept: LAB | Facility: HOSPITAL | Age: 67
End: 2022-09-28

## 2022-09-28 ENCOUNTER — TELEPHONE (OUTPATIENT)
Dept: FAMILY MEDICINE CLINIC | Facility: CLINIC | Age: 67
End: 2022-09-28

## 2022-09-28 ENCOUNTER — TELEPHONE (OUTPATIENT)
Dept: ORTHOPEDIC SURGERY | Facility: CLINIC | Age: 67
End: 2022-09-28

## 2022-09-28 ENCOUNTER — HOSPITAL ENCOUNTER (OUTPATIENT)
Dept: SLEEP MEDICINE | Facility: HOSPITAL | Age: 67
Discharge: HOME | End: 2022-09-28

## 2022-09-28 DIAGNOSIS — J22 LOWER RESPIRATORY TRACT INFECTION: Primary | ICD-10-CM

## 2022-09-28 DIAGNOSIS — Z11.59 SCREENING FOR VIRAL DISEASE: ICD-10-CM

## 2022-09-28 DIAGNOSIS — M81.0 SENILE OSTEOPOROSIS: Primary | ICD-10-CM

## 2022-09-28 DIAGNOSIS — M81.0 SENILE OSTEOPOROSIS: ICD-10-CM

## 2022-09-28 DIAGNOSIS — G47.33 OSA (OBSTRUCTIVE SLEEP APNEA): ICD-10-CM

## 2022-09-28 LAB — CALCIUM SPEC-SCNC: 9 MG/DL (ref 8.6–10.5)

## 2022-09-28 PROCEDURE — 95811 POLYSOM 6/>YRS CPAP 4/> PARM: CPT | Performed by: PSYCHIATRY & NEUROLOGY

## 2022-09-28 PROCEDURE — 82310 ASSAY OF CALCIUM: CPT

## 2022-09-28 PROCEDURE — 95811 POLYSOM 6/>YRS CPAP 4/> PARM: CPT

## 2022-09-28 PROCEDURE — C9803 HOPD COVID-19 SPEC COLLECT: HCPCS

## 2022-09-28 RX ORDER — DOXYCYCLINE HYCLATE 100 MG/1
100 CAPSULE ORAL 2 TIMES DAILY
Qty: 14 CAPSULE | Refills: 0 | Status: SHIPPED | OUTPATIENT
Start: 2022-09-28 | End: 2022-10-05

## 2022-09-28 NOTE — TELEPHONE ENCOUNTER
Patient called wanting to know if she can get another round of antibiotics for her sinus infection. She finished the last round Sunday but is still blowing snot with traces of blood.

## 2022-09-28 NOTE — TELEPHONE ENCOUNTER
RX for 7 additional days of doxycycline submitted - advised to stop vitamin/supplement while taking doxycycline.

## 2022-10-04 ENCOUNTER — OFFICE VISIT (OUTPATIENT)
Dept: PULMONOLOGY | Facility: CLINIC | Age: 67
End: 2022-10-04

## 2022-10-04 VITALS
HEART RATE: 89 BPM | DIASTOLIC BLOOD PRESSURE: 80 MMHG | OXYGEN SATURATION: 98 % | HEIGHT: 66 IN | SYSTOLIC BLOOD PRESSURE: 128 MMHG | BODY MASS INDEX: 27.5 KG/M2 | WEIGHT: 171.1 LBS

## 2022-10-04 DIAGNOSIS — J98.8 VIRAL RESPIRATORY INFECTION: Primary | ICD-10-CM

## 2022-10-04 DIAGNOSIS — J45.41 MODERATE PERSISTENT ASTHMA WITH EXACERBATION: ICD-10-CM

## 2022-10-04 DIAGNOSIS — B97.89 VIRAL RESPIRATORY INFECTION: Primary | ICD-10-CM

## 2022-10-04 PROCEDURE — 99214 OFFICE O/P EST MOD 30 MIN: CPT | Performed by: INTERNAL MEDICINE

## 2022-10-04 RX ORDER — BENZONATATE 100 MG/1
100 CAPSULE ORAL 3 TIMES DAILY PRN
Qty: 42 CAPSULE | Refills: 3 | Status: SHIPPED | OUTPATIENT
Start: 2022-10-04 | End: 2022-10-28 | Stop reason: SDUPTHER

## 2022-10-04 RX ORDER — PREDNISONE 10 MG/1
TABLET ORAL
Qty: 27 TABLET | Refills: 0 | Status: SHIPPED | OUTPATIENT
Start: 2022-10-04 | End: 2022-10-28

## 2022-10-04 NOTE — PROGRESS NOTES
Pulmonary Office Follow-up    Subjective     Genna Garcia is seen today at the office for   Chief Complaint   Patient presents with   • Bronchitis   • Asthma         HPI  Genna Garcia is a 67 y.o. female with a PMH significant for bronciectasis.     10/4/22  Patient here for follow up. Since last visit, she had a swallow study that did not show evidence of aspiration. She also had a home sleep study that showed an AHI of 15. She had her CPAP titration study and needed a CPAP of 8. She's fairly claustrophobic so had a hard time with the mask    Patient tells me she got sick on Sept 4th. She had chills, maybe a fever, body aches, back pain, headaches. She had a mild cough. She started wheezing. She was put on doxycycline x 2, not given any steroids. Continues ot have wheezing and still short of breath and fatigued.    7/29/22  Patinet here to follow up on multiple tests. Had CT chest that showed unchanged chronic fibrotic changes in the bases. PFTs today. Swallow study hasn't been done yet (scheduled for the 10th)  She has been doing much better on the Breztri than the Breo, fels like breathing is much better. She also does a breathing treatment before meals and that helps her to cough less as well      7/11/22  Patient here to follow up after EGD for esophageal dilation. She feels like she is swallowing better and having less chest pressure. Still coughing with eating though. She coughs other times too, but has more of a choking cough with eating. Does feel like the steroids helped as well.      6/14/22  Patient here for routine follow up. She reports that for the last few months, more chest proessure and trouble breahting. Neb treatments do help, she brings up more mucus with those. She is using Breo at night before bed.  She has been having more snoring lately and concerns about apneas  She has seen Dr Rondon, has an EGD scheduled for July 5th with plans for a dilation  She saw allergy  and was started on Singulair. She was told she couldn't get skin testing done because she would have to stop all of her antihistamines for two weeks and she doesn't feel she can stop them for that long    12/16/21  Patient here for follow up. She is doing pretty well from a breathing standpoint. Has episodes of getting more winded. She notices it mostly when the weather is bad. She's still doing her Breo daily in the afternoon which helps. Her asthma seems well controlled. She does feel like her allergies are worse though. She used to get allergy shots and had to stop when she broke her arm. She would like to see a new allergist about getting back on IT schedule  She saw GI and had an esophageal dilation again since last visit. She is eating better. However she still has coughing with eating    9/24/21  Patient here for follow up. At last visit I ordered her Symbicort but her insurance wouldn't cover it. She apparently got generic low dose Symbicort though, and this gave her severe headaches. Went back to Breo but taking it later in the day and that worked better  She continues to have coughing when she eats. She didn't discuss this with her GI doc at last appointment but she is going to see him again    Last OV 7/1/21  Last seen by Dr Evans in Feb  Patient with multiple overlapping issues going on. Essentially she has chronic productive cough from several sources. She has reflux and symptoms of aspiration (coughing with/after eating), and the lower lobe fibrosis on her Ct scan supports this. I can't find a swallow study anywhere. She has esophageal strictures that she has to have stretched periodically   She has allergies, previously on allergy shots but can't afford them now. She has seen ENT before, was going to have sinus surgery but held off for other medical reasons and now her ENT doctor has left. She has run out of her nasal sprays and Allegra.  She is on Breo and that helps for most of the morning but by  late afternoon she is having wheezing and coughing again. She uses Duonebs and Combivent as needed    Tobacco use history:  Type: cigarettes  Amount: 2-3 ppd  Duration: 18 years  Cessation: 2002   Willing to quit: N/A      Patient Active Problem List   Diagnosis   • Scoliosis of thoracolumbar spine   • Other dysphagia   • Pain in joint of right shoulder   • Change in bowel habits   • Chronic cough   • Chronic allergic rhinitis   • Severe allergic reaction with respiratory distress   • Senile osteoporosis   • Gastroesophageal reflux disease without esophagitis   • History of esophageal stricture   • History of esophageal dilatation   • Dysphagia   • History of tobacco abuse   • Postmenopausal   • Early menopause occurring in patient age younger than 45 years   • Irritable bowel syndrome   • Chronic obstructive pulmonary disease (HCC)   • Long-term use of high-risk medication   • Overweight with body mass index (BMI) of 27 to 27.9 in adult   • Chronic bilateral low back pain         Medications, Allergies, Social, and Family Histories reviewed as per EMR.    Objective     Vitals:    10/04/22 1040   BP: 128/80   Pulse: 89   SpO2: 98%         10/04/22  1040   Weight: 77.6 kg (171 lb 1.6 oz)     [unfilled]  Physical Exam  Vitals reviewed.   Constitutional:       Appearance: Normal appearance.   HENT:      Head: Normocephalic and atraumatic.      Nose: Nose normal.      Mouth/Throat:      Mouth: Mucous membranes are moist.      Pharynx: Oropharynx is clear.   Eyes:      Conjunctiva/sclera: Conjunctivae normal.      Pupils: Pupils are equal, round, and reactive to light.   Cardiovascular:      Rate and Rhythm: Normal rate and regular rhythm.      Pulses: Normal pulses.      Heart sounds: Normal heart sounds.   Pulmonary:      Effort: Pulmonary effort is normal.      Breath sounds: Normal breath sounds.   Abdominal:      General: Abdomen is flat. Bowel sounds are normal.      Palpations: Abdomen is soft.    Musculoskeletal:         General: Normal range of motion.      Cervical back: Normal range of motion.   Skin:     General: Skin is warm and dry.   Neurological:      General: No focal deficit present.      Mental Status: She is alert and oriented to person, place, and time.   Psychiatric:         Mood and Affect: Mood normal.         Behavior: Behavior normal.               PFTs:  (independently reviewed and interpreted by me)  10/21/20  FVC 2.22L,  69%  FEV1 1.73L,  69%  Ratio 78%  TLC 2.77L,  53%  DLCO 15.66L,  61%  Variable effort.  Mild restriction. Mildly reduced diffusing capacity. No comparative data available.    7/29/22  FVC 2.33L, 73%  FEV1 1.84L, 74%  Ratio 67%  +BDR  TLC 3.65L, 69%  DLCO 47%  Mild reversible obstruction and mild restriction, significant BDR, decreased lung volumes, moderate diffusion impairment     Radiology (independently reviewed and interpreted by me):   CT chest  5/20/2020 - small focus of nodularity posterior right upper lobe, lower lobe predominant interstitial opacity with air bronchograms and traction bronchiectasis, numerous calcified granulomas bilaterally, mildly enlarged mediastinal lymph nodes largest precarinal measuring 1.1 x 1.55 cm, small axillary and retropectoral lymph nodes largest measuring 8.5 mm, findings similar to 4/2/2016. Notable thoracic/lumbar scoliosis     Swallow study 8/10/22- Minimal Dysphagia- video swallow shows slight deviance from a normal swallow. Patient may report change in sensation during swallow. No change in diet is required.    Assessment & Plan     Diagnoses and all orders for this visit:    1. Viral respiratory infection (Primary)    2. Moderate persistent asthma with exacerbation    Other orders  -     predniSONE (DELTASONE) 10 MG tablet; Take 40mg PO daily x 3d, then 30mg PO x 3d, then 20mg PO x 2d, then 10mg PO x 2d then stop  Dispense: 27 tablet; Refill: 0  -     benzonatate (TESSALON) 100 MG capsule; Take 1 capsule by mouth 3 (Three)  Times a Day As Needed for Cough.  Dispense: 42 capsule; Refill: 3         Discussion/ Recommendations:   Patient sounds like she did in fact have a viral illness to start, which then exacerbated her asthma. Unfortunately she was not treated with steroids and her symptoms have persisted. Will start now and do a longer course. Should continue with the Breztri and nebs. She is also getting some relief with the Tessalon so can take that prn as well    Cough is multifactorial and will always be present to some degree, goal is to keep it managable    Patient to call me back if not feeling better next week. Otherwise will follow up in 3 months    Return in about 3 months (around 1/4/2023) for f/u asthma, restrictive lung disease.          This document has been electronically signed by Mariah De Jesus DO on October 4, 2022 11:30 CDT

## 2022-10-05 ENCOUNTER — INFUSION (OUTPATIENT)
Dept: ONCOLOGY | Facility: HOSPITAL | Age: 67
End: 2022-10-05

## 2022-10-05 VITALS
SYSTOLIC BLOOD PRESSURE: 152 MMHG | RESPIRATION RATE: 18 BRPM | DIASTOLIC BLOOD PRESSURE: 83 MMHG | TEMPERATURE: 96.7 F | HEART RATE: 86 BPM

## 2022-10-05 DIAGNOSIS — M81.0 SENILE OSTEOPOROSIS: Primary | ICD-10-CM

## 2022-10-05 PROCEDURE — 96372 THER/PROPH/DIAG INJ SC/IM: CPT | Performed by: NURSE PRACTITIONER

## 2022-10-05 PROCEDURE — 25010000002 DENOSUMAB 60 MG/ML SOLUTION PREFILLED SYRINGE: Performed by: NURSE PRACTITIONER

## 2022-10-05 RX ADMIN — DENOSUMAB 60 MG: 60 INJECTION SUBCUTANEOUS at 12:49

## 2022-10-13 ENCOUNTER — OFFICE VISIT (OUTPATIENT)
Dept: GASTROENTEROLOGY | Facility: CLINIC | Age: 67
End: 2022-10-13

## 2022-10-13 VITALS
HEART RATE: 86 BPM | BODY MASS INDEX: 27.64 KG/M2 | HEIGHT: 66 IN | SYSTOLIC BLOOD PRESSURE: 126 MMHG | WEIGHT: 172 LBS | DIASTOLIC BLOOD PRESSURE: 63 MMHG

## 2022-10-13 DIAGNOSIS — R13.19 OTHER DYSPHAGIA: Primary | ICD-10-CM

## 2022-10-13 DIAGNOSIS — K21.9 GASTROESOPHAGEAL REFLUX DISEASE WITHOUT ESOPHAGITIS: ICD-10-CM

## 2022-10-13 PROBLEM — R13.10 DYSPHAGIA: Status: RESOLVED | Noted: 2022-03-07 | Resolved: 2022-10-13

## 2022-10-13 PROCEDURE — 99214 OFFICE O/P EST MOD 30 MIN: CPT | Performed by: INTERNAL MEDICINE

## 2022-10-13 RX ORDER — PANTOPRAZOLE SODIUM 40 MG/1
40 TABLET, DELAYED RELEASE ORAL DAILY
Qty: 30 TABLET | Refills: 5 | Status: SHIPPED | OUTPATIENT
Start: 2022-10-13

## 2022-10-13 NOTE — PROGRESS NOTES
"[facility]      Chief Complaint:   Chief Complaint   Patient presents with   • Follow-up       Subjective     HPI:   Patient with history of dysphagia.  Patient has had EGD with dilatations in the past.  Patient does occasionally still get reflux but states that this is markedly improved at this time.  Patient is currently taking Protonix 40 mg daily.  Patient denies any nausea vomiting.  Discussed with patient that if these dysphagia gets any worse we may need to repeat EGD with dilatation.    Plan; we will schedule patient for follow-up in 6 months.  Continue patient on Protonix daily.  Past Medical History:   Past Medical History:   Diagnosis Date   • Anesthesia complication     states she \"stopped breathing during her last procedure\"   • Arthritis    • Asthma    • Elevated cholesterol    • Endometriosis    • Falls    • GERD (gastroesophageal reflux disease)    • Osteoporosis    • Pulmonary fibrosis (HCC)    • Scoliosis of thoracolumbar spine    • Seasonal rhinitis    • Sinusitis        Past Surgical History:  Past Surgical History:   Procedure Laterality Date   • APPENDECTOMY     • COLONOSCOPY  07/05/2022    Per Dr. Evan Chavez M.D., [facility], [place], [place].   • COLONOSCOPY N/A 06/30/2020    Procedure: COLONOSCOPY;  Surgeon: Evan Chavez MD;  Location: [facility] ENDOSCOPY;  Service: Gastroenterology;  Laterality: N/A;   • ENDOSCOPY N/A 10/16/2019    Procedure: ESOPHAGOGASTRODUODENOSCOPY;  Surgeon: Evan Chavez MD;  Location: [facility] ENDOSCOPY;  Service: Gastroenterology   • ENDOSCOPY N/A 06/30/2020    Procedure: ESOPHAGOGASTRODUODENOSCOPY;  Surgeon: Evan Chavez MD;  Location: [facility] ENDOSCOPY;  Service: Gastroenterology;  Laterality: N/A;  savory dilation 48-54   • ENDOSCOPY N/A 11/17/2021    Procedure: ESOPHAGOGASTRODUODENOSCOPY;  Surgeon: Evan Chavez MD;  Location: [facility] ENDOSCOPY;  Service: Gastroenterology;  Laterality: N/A;  " 48-54 f eso dil. blue   • ENDOSCOPY N/A 7/5/2022    Procedure: ESOPHAGOGASTRODUODENOSCOPY;  Surgeon: Evan Rondon MD;  Location: [facility] ENDOSCOPY;  Service: Gastroenterology;  Laterality: N/A;   • FOREARM SURGERY     • HYSTERECTOMY  1984    total   • SALPINGO OOPHORECTOMY     • UPPER GASTROINTESTINAL ENDOSCOPY  10/16/2019   • UPPER GASTROINTESTINAL ENDOSCOPY  06/30/2020   • UPPER GASTROINTESTINAL ENDOSCOPY  11/17/2021   • WRIST FRACTURE SURGERY Right        Family History:  Family History   Problem Relation Age of Onset   • Heart failure Mother    • Thyroid disease Mother    • Ulcerative colitis Father        Social History:   reports that she quit smoking about 20 years ago. Her smoking use included cigarettes. She started smoking about 40 years ago. She has a 50.00 pack-year smoking history. She has never used smokeless tobacco. She reports that she does not currently use alcohol. She reports that she does not use drugs.    Medications:   Prior to Admission medications    Medication Sig Start Date End Date Taking? Authorizing Provider   acetaminophen-codeine (TYLENOL #3) 300-30 MG per tablet Take 1 tablet by mouth 2 (Two) Times a Day As Needed for Moderate Pain . 7/21/22  Yes Neena Kee APRN   albuterol sulfate HFA (Ventolin HFA) 108 (90 Base) MCG/ACT inhaler Inhale 2 puffs Every 4 (Four) Hours As Needed for Wheezing or Shortness of Air. 7/11/22  Yes Mariah De Jesus DO   benzonatate (Tessalon Perles) 100 MG capsule Take 1 capsule by mouth 3 (Three) Times a Day As Needed for Cough. 9/13/22  Yes Neena Kee APRN   benzonatate (TESSALON) 100 MG capsule Take 1 capsule by mouth 3 (Three) Times a Day As Needed for Cough. 10/4/22  Yes Mariah De Jesus DO   Biotin 1 MG capsule Take 1 capsule by mouth Daily.   Yes Provider, MD Roosevelt   Budeson-Glycopyrrol-Formoterol (Breztri Aerosphere) 160-9-4.8 MCG/ACT aerosol inhaler Inhale 2 puffs 2 (Two) Times a Day. 7/29/22  Yes Mariah De Jesus DO    Calcium-Magnesium-Vitamin D - MG-MG-UNIT tablet sustained-release 24 hour Take 2 tablets by mouth Daily.   Yes Roosevelt Bryant MD   Cranberry 500 MG tablet Take 500 mg by mouth 2 (two) times a day.   Yes Roosevelt Bryant MD   dicyclomine (BENTYL) 20 MG tablet Take 1 tablet by mouth Every 6 (Six) Hours. 9/1/21  Yes Evan Rondon MD   famotidine (PEPCID) 20 MG tablet Take 1 tablet by mouth Daily. 7/15/22  Yes Evan Rondon MD   fexofenadine (Allegra Allergy) 180 MG tablet Take 1 tablet by mouth Daily. 12/16/21  Yes Mariah De Jesus DO   fluconazole (DIFLUCAN) 150 MG tablet Take 1 tablet by mouth Take As Directed. Take on tab PO at onset of sx; may repeat dose in 72 hours if needed 9/19/22  Yes Neena Kee APRN   fluticasone (CUTIVATE) 0.005 % ointment  8/31/22  Yes Roosevelt Bryant MD   fluticasone (FLONASE) 50 MCG/ACT nasal spray 2 sprays into each nostril daily 7/1/21  Yes Mariah De Jesus DO   ipratropium-albuterol (DUO-NEB) 0.5-2.5 mg/3 ml nebulizer Take 3 mL by nebulization Every 4 (Four) Hours As Needed for Wheezing or Shortness of Air. 9/24/21  Yes Mariah De Jesus DO   montelukast (SINGULAIR) 10 MG tablet Take 10 mg by mouth Daily. 3/1/22  Yes Roosevelt Bryant MD   mupirocin (BACTROBAN) 2 % ointment Apply 1 application topically to the appropriate area as directed 2 (Two) Times a Day. 7/21/22  Yes Neena Kee APRN   olopatadine (PATANASE) 0.6 % solution nasal solution 2 sprays by Each Nare route 2 (Two) Times a Day. 7/1/21  Yes Mariah De Jesus DO   pantoprazole (PROTONIX) 40 MG EC tablet Take 1 tablet by mouth Daily. 10/13/22  Yes Evan Rondon MD   Potassium Iodide, Expectorant, (SSKI) 1 GM/ML solution Take 0.6 mL by mouth 3 (Three) Times a Day. 12/2/20  Yes Jorgito Evans MD   predniSONE (DELTASONE) 10 MG tablet Take 40mg PO daily x 3d, then 30mg PO x 3d, then 20mg PO x 2d, then 10mg PO x 2d then stop 10/4/22  Yes Mariah De Jesus, DO  "  triamcinolone (KENALOG) 0.025 % ointment Apply 1 application topically to the appropriate area as directed 2 (Two) Times a Day. 7/21/22  Yes Neena Kee APRN   vitamin D3 125 MCG (5000 UT) capsule capsule Take 5,000 Units by mouth Daily. Takes 2 daily   Yes ProviderRoosevelt MD   pantoprazole (PROTONIX) 40 MG EC tablet Take 1 tablet by mouth Daily. 7/15/22 10/13/22 Yes Evan Rondon MD       Allergies:  Sulfa antibiotics, Levaquin [levofloxacin], Augmentin [amoxicillin-pot clavulanate], and Cefuroxime    ROS:    Review of Systems   Constitutional: Negative for activity change, appetite change, chills, diaphoresis, fatigue, fever and unexpected weight change.   HENT: Negative for sore throat and trouble swallowing.    Respiratory: Negative for shortness of breath.    Gastrointestinal: Negative for abdominal distention, abdominal pain, anal bleeding, blood in stool, constipation, diarrhea, nausea, rectal pain and vomiting.   Endocrine: Negative for polydipsia, polyphagia and polyuria.   Genitourinary: Negative for difficulty urinating.   Musculoskeletal: Negative for arthralgias.   Skin: Negative for pallor.   Allergic/Immunologic: Negative for food allergies.   Neurological: Negative for weakness and light-headedness.   Psychiatric/Behavioral: Negative for behavioral problems.     Objective     Blood pressure 126/63, pulse 86, height 166.4 cm (65.5\"), weight 78 kg (172 lb).    Physical Exam  Constitutional:       General: She is not in acute distress.     Appearance: She is well-developed. She is not diaphoretic.   HENT:      Head: Normocephalic and atraumatic.   Cardiovascular:      Rate and Rhythm: Normal rate and regular rhythm.      Heart sounds: Normal heart sounds. No murmur heard.    No friction rub. No gallop.   Pulmonary:      Effort: No respiratory distress.      Breath sounds: Normal breath sounds. No wheezing or rales.   Chest:      Chest wall: No tenderness.   Abdominal:      General: Bowel " sounds are normal. There is no distension.      Palpations: Abdomen is soft. There is no mass.      Tenderness: There is no abdominal tenderness. There is no guarding or rebound.      Hernia: No hernia is present.   Musculoskeletal:         General: Normal range of motion.   Skin:     General: Skin is warm and dry.      Coloration: Skin is not pale.      Findings: No erythema or rash.   Neurological:      Mental Status: She is alert and oriented to person, place, and time.   Psychiatric:         Behavior: Behavior normal.         Thought Content: Thought content normal.         Judgment: Judgment normal.          Assessment & Plan   Diagnoses and all orders for this visit:    1. Other dysphagia (Primary)    2. Gastroesophageal reflux disease without esophagitis    Other orders  -     pantoprazole (PROTONIX) 40 MG EC tablet; Take 1 tablet by mouth Daily.  Dispense: 30 tablet; Refill: 5        * Surgery not found *     Diagnosis Plan   1. Other dysphagia        2. Gastroesophageal reflux disease without esophagitis            Anticipated Surgical Procedure:  No orders of the defined types were placed in this encounter.      The risks, benefits, and alternatives of this procedure have been discussed with the patient or the responsible party- the patient understands and agrees to proceed.

## 2022-10-19 ENCOUNTER — OFFICE VISIT (OUTPATIENT)
Dept: SLEEP MEDICINE | Facility: HOSPITAL | Age: 67
End: 2022-10-19

## 2022-10-19 VITALS
DIASTOLIC BLOOD PRESSURE: 69 MMHG | SYSTOLIC BLOOD PRESSURE: 133 MMHG | HEART RATE: 79 BPM | BODY MASS INDEX: 28.25 KG/M2 | OXYGEN SATURATION: 96 % | WEIGHT: 172.4 LBS

## 2022-10-19 DIAGNOSIS — G47.33 OSA (OBSTRUCTIVE SLEEP APNEA): Primary | ICD-10-CM

## 2022-10-19 PROCEDURE — 99213 OFFICE O/P EST LOW 20 MIN: CPT | Performed by: NURSE PRACTITIONER

## 2022-10-19 NOTE — PROGRESS NOTES
Sleep Clinic Follow-Up    CHIEF COMPLAINT: follow up sleep testing    LAST OV: 09/01/2022 (I reviewed this note)    HPI:  The patient is a 67 y.o. female.  I discussed the results of the recent CPAP titration performed on 09/28/2022.  Poor sleep efficiency. REM and NREM sleep. At CPAP of 8 cm H2O AHI reduced to 1.3. Mean O2 was 93% with a jose of 82%. Only spent 0.3 min with O2 < 89%. PLMI of no significance. No snoring.     INTERVAL MEDICAL HISTORY: No change since last office visit in regard to the patient's bedtime routine, medications, or diagnosis.    Sleep study:   1. HST on 08/18/2022 AHI of 15   2. CPAP titration on 09/28/2022 recommended 8 cm H2O (study not yet formally interpreted by Dr. Hernandez)    PAP Data:  Currently not on therapy   Mask type: Full face mask, did well with small vitera FFM  DME: [facility]         MEDICATIONS:   Current Outpatient Medications:   •  acetaminophen-codeine (TYLENOL #3) 300-30 MG per tablet, Take 1 tablet by mouth 2 (Two) Times a Day As Needed for Moderate Pain ., Disp: 60 tablet, Rfl: 0  •  albuterol sulfate HFA (Ventolin HFA) 108 (90 Base) MCG/ACT inhaler, Inhale 2 puffs Every 4 (Four) Hours As Needed for Wheezing or Shortness of Air., Disp: 18 g, Rfl: 2  •  benzonatate (Tessalon Perles) 100 MG capsule, Take 1 capsule by mouth 3 (Three) Times a Day As Needed for Cough., Disp: 30 capsule, Rfl: 0  •  benzonatate (TESSALON) 100 MG capsule, Take 1 capsule by mouth 3 (Three) Times a Day As Needed for Cough., Disp: 42 capsule, Rfl: 3  •  Biotin 1 MG capsule, Take 1 capsule by mouth Daily., Disp: , Rfl:   •  Budeson-Glycopyrrol-Formoterol (Breztri Aerosphere) 160-9-4.8 MCG/ACT aerosol inhaler, Inhale 2 puffs 2 (Two) Times a Day., Disp: 1 each, Rfl: 11  •  Calcium-Magnesium-Vitamin D - MG-MG-UNIT tablet sustained-release 24 hour, Take 2 tablets by mouth Daily., Disp: , Rfl:   •  Cranberry 500 MG tablet, Take 500 mg by mouth 2 (two) times a day., Disp: , Rfl:   •   dicyclomine (BENTYL) 20 MG tablet, Take 1 tablet by mouth Every 6 (Six) Hours., Disp: 120 tablet, Rfl: 5  •  famotidine (PEPCID) 20 MG tablet, Take 1 tablet by mouth Daily., Disp: 30 tablet, Rfl: 3  •  fexofenadine (Allegra Allergy) 180 MG tablet, Take 1 tablet by mouth Daily., Disp: 30 tablet, Rfl: 11  •  fluconazole (DIFLUCAN) 150 MG tablet, Take 1 tablet by mouth Take As Directed. Take on tab PO at onset of sx; may repeat dose in 72 hours if needed, Disp: 2 tablet, Rfl: 0  •  fluticasone (CUTIVATE) 0.005 % ointment, , Disp: , Rfl:   •  fluticasone (FLONASE) 50 MCG/ACT nasal spray, 2 sprays into each nostril daily, Disp: 16 mL, Rfl: 5  •  ipratropium-albuterol (DUO-NEB) 0.5-2.5 mg/3 ml nebulizer, Take 3 mL by nebulization Every 4 (Four) Hours As Needed for Wheezing or Shortness of Air., Disp: 360 mL, Rfl: 5  •  montelukast (SINGULAIR) 10 MG tablet, Take 10 mg by mouth Daily., Disp: , Rfl:   •  mupirocin (BACTROBAN) 2 % ointment, Apply 1 application topically to the appropriate area as directed 2 (Two) Times a Day., Disp: 30 g, Rfl: 0  •  olopatadine (PATANASE) 0.6 % solution nasal solution, 2 sprays by Each Nare route 2 (Two) Times a Day., Disp: 30 g, Rfl: 5  •  pantoprazole (PROTONIX) 40 MG EC tablet, Take 1 tablet by mouth Daily., Disp: 30 tablet, Rfl: 5  •  Potassium Iodide, Expectorant, (SSKI) 1 GM/ML solution, Take 0.6 mL by mouth 3 (Three) Times a Day., Disp: 15 mL, Rfl: 10  •  predniSONE (DELTASONE) 10 MG tablet, Take 40mg PO daily x 3d, then 30mg PO x 3d, then 20mg PO x 2d, then 10mg PO x 2d then stop, Disp: 27 tablet, Rfl: 0  •  triamcinolone (KENALOG) 0.025 % ointment, Apply 1 application topically to the appropriate area as directed 2 (Two) Times a Day., Disp: 30 g, Rfl: 0  •  vitamin D3 125 MCG (5000 UT) capsule capsule, Take 5,000 Units by mouth Daily. Takes 2 daily, Disp: , Rfl:     PHYSICAL EXAM  Vitals:    10/19/22 1052   BP: 133/69   Pulse: 79   SpO2: 96%           10/19/22  1052   Weight: 78.2 kg  (172 lb 6.4 oz)       Body mass index is 28.25 kg/m². BMI is >= 25 and <30. (Overweight) The following options were offered after discussion;: nutrition counseling/recommendations      Gen:  No acute distress heard in voice, alert, oriented  Eyes:    Moist conjunctiva, EOMI   Lungs:  Normal effort, non-labored breathing  Heart:  No lower extremity edema   Psych:  Appropriate affect   Neuro:  Cn2-12 appear intact     Assessment and Plan:    1. Obstructive sleep apnea -  1. Start on APAP 5-8 cm h2O with mask fitting per DME and PAP supplies   2. Continue PAP as prescribed.   3. Monitor compliance report   4. Drowsy driving tips- do not drive if feeling sleepy   5. Return to clinic in 6 weeks with compliance report, or sooner if needed   2. COPD    The sleep results were discussed in detail with the patient.  The risks of untreated sleep apnea were reviewed.   I counseled patient on PAP management, maintenance, compliance, sleep hygiene, including regular sleep wake schedule and stimulus control therapy, the importance of  safe driving and abstaining from smoking and alcohol consumption, as well as other sedatives.       All of the patient's questions were answered. She states understanding and agreement with my assessment and plan as above.     I obtained a brief history from the patient, reviewed the medical problems and current medications, and made medical decisions regarding treatment based on that information. Total visit time 21 min, with total of 12 minutes spent counseling patient regarding: PAP therapy, PAP compliance, PAP maintenance, Lifestyle modifications, Sleep hygiene and Study results.       Patient to follow up in 31-90 days with compliance report   She agrees to return sooner on a prn basis if sx change.           This document has been electronically signed by MARCELLA Jeronimo on October 19, 2022 10:58 CDT            CC: System, Provider Not In          No ref. provider found

## 2022-10-27 DIAGNOSIS — R07.81 PLEURITIC CHEST PAIN: Primary | ICD-10-CM

## 2022-10-28 ENCOUNTER — HOSPITAL ENCOUNTER (OUTPATIENT)
Dept: GENERAL RADIOLOGY | Facility: HOSPITAL | Age: 67
Discharge: HOME | End: 2022-10-28
Admitting: INTERNAL MEDICINE

## 2022-10-28 ENCOUNTER — OFFICE VISIT (OUTPATIENT)
Dept: PULMONOLOGY | Facility: CLINIC | Age: 67
End: 2022-10-28

## 2022-10-28 VITALS
HEIGHT: 66 IN | OXYGEN SATURATION: 96 % | WEIGHT: 170.6 LBS | BODY MASS INDEX: 27.42 KG/M2 | HEART RATE: 100 BPM | SYSTOLIC BLOOD PRESSURE: 136 MMHG | DIASTOLIC BLOOD PRESSURE: 88 MMHG

## 2022-10-28 DIAGNOSIS — R05.3 CHRONIC COUGH: Primary | ICD-10-CM

## 2022-10-28 DIAGNOSIS — R07.81 PLEURITIC CHEST PAIN: ICD-10-CM

## 2022-10-28 PROCEDURE — 99213 OFFICE O/P EST LOW 20 MIN: CPT | Performed by: INTERNAL MEDICINE

## 2022-10-28 PROCEDURE — 71046 X-RAY EXAM CHEST 2 VIEWS: CPT

## 2022-10-28 NOTE — PROGRESS NOTES
Pulmonary Office Follow-up    Subjective     Genna Garcia is seen today at the office for   Chief Complaint   Patient presents with   • X-ray results         HPI  Genna Garcia is a 67 y.o. female with a PMH significant for bronciectasis.     10/28/22  Patient here for acute visit. She called yesterday with complaint of pleuritic pain on the right. She finished a course of prednisone recently. She got a CXR today that appeared at her baseline  She reports that for about the last week she feels a flutter in the back of her chest      10/4/22  Patient here for follow up. Since last visit, she had a swallow study that did not show evidence of aspiration. She also had a home sleep study that showed an AHI of 15. She had her CPAP titration study and needed a CPAP of 8. She's fairly claustrophobic so had a hard time with the mask    Patient tells me she got sick on Sept 4th. She had chills, maybe a fever, body aches, back pain, headaches. She had a mild cough. She started wheezing. She was put on doxycycline x 2, not given any steroids. Continues ot have wheezing and still short of breath and fatigued.    7/29/22  Patinet here to follow up on multiple tests. Had CT chest that showed unchanged chronic fibrotic changes in the bases. PFTs today. Swallow study hasn't been done yet (scheduled for the 10th)  She has been doing much better on the Breztri than the Breo, fels like breathing is much better. She also does a breathing treatment before meals and that helps her to cough less as well      7/11/22  Patient here to follow up after EGD for esophageal dilation. She feels like she is swallowing better and having less chest pressure. Still coughing with eating though. She coughs other times too, but has more of a choking cough with eating. Does feel like the steroids helped as well.      6/14/22  Patient here for routine follow up. She reports that for the last few months, more chest proessure and  trouble breahting. Neb treatments do help, she brings up more mucus with those. She is using Breo at night before bed.  She has been having more snoring lately and concerns about apneas  She has seen Dr Rondon, has an EGD scheduled for July 5th with plans for a dilation  She saw allergy and was started on Singulair. She was told she couldn't get skin testing done because she would have to stop all of her antihistamines for two weeks and she doesn't feel she can stop them for that long    12/16/21  Patient here for follow up. She is doing pretty well from a breathing standpoint. Has episodes of getting more winded. She notices it mostly when the weather is bad. She's still doing her Breo daily in the afternoon which helps. Her asthma seems well controlled. She does feel like her allergies are worse though. She used to get allergy shots and had to stop when she broke her arm. She would like to see a new allergist about getting back on IT schedule  She saw GI and had an esophageal dilation again since last visit. She is eating better. However she still has coughing with eating    9/24/21  Patient here for follow up. At last visit I ordered her Symbicort but her insurance wouldn't cover it. She apparently got generic low dose Symbicort though, and this gave her severe headaches. Went back to Breo but taking it later in the day and that worked better  She continues to have coughing when she eats. She didn't discuss this with her GI doc at last appointment but she is going to see him again    Last OV 7/1/21  Last seen by Dr Evans in Feb  Patient with multiple overlapping issues going on. Essentially she has chronic productive cough from several sources. She has reflux and symptoms of aspiration (coughing with/after eating), and the lower lobe fibrosis on her Ct scan supports this. I can't find a swallow study anywhere. She has esophageal strictures that she has to have stretched periodically   She has allergies,  previously on allergy shots but can't afford them now. She has seen ENT before, was going to have sinus surgery but held off for other medical reasons and now her ENT doctor has left. She has run out of her nasal sprays and Allegra.  She is on Breo and that helps for most of the morning but by late afternoon she is having wheezing and coughing again. She uses Duonebs and Combivent as needed    Tobacco use history:  Type: cigarettes  Amount: 2-3 ppd  Duration: 18 years  Cessation: 2002   Willing to quit: N/A      Patient Active Problem List   Diagnosis   • Scoliosis of thoracolumbar spine   • Other dysphagia   • Pain in joint of right shoulder   • Change in bowel habits   • Chronic cough   • Chronic allergic rhinitis   • Severe allergic reaction with respiratory distress   • Senile osteoporosis   • Gastroesophageal reflux disease without esophagitis   • History of esophageal stricture   • History of esophageal dilatation   • History of tobacco abuse   • Postmenopausal   • Early menopause occurring in patient age younger than 45 years   • Irritable bowel syndrome   • Chronic obstructive pulmonary disease (HCC)   • Long-term use of high-risk medication   • Overweight with body mass index (BMI) of 27 to 27.9 in adult   • Chronic bilateral low back pain         Medications, Allergies, Social, and Family Histories reviewed as per EMR.    Objective     Vitals:    10/28/22 1310   BP: 136/88   Pulse: 100   SpO2: 96%         10/28/22  1310   Weight: 77.4 kg (170 lb 9.6 oz)     [unfilled]  Physical Exam  Vitals reviewed.   Constitutional:       Appearance: Normal appearance.   HENT:      Head: Normocephalic and atraumatic.      Nose: Nose normal.      Mouth/Throat:      Mouth: Mucous membranes are moist.      Pharynx: Oropharynx is clear.   Eyes:      Conjunctiva/sclera: Conjunctivae normal.      Pupils: Pupils are equal, round, and reactive to light.   Cardiovascular:      Rate and Rhythm: Normal rate and regular rhythm.       Pulses: Normal pulses.      Heart sounds: Normal heart sounds.   Pulmonary:      Effort: Pulmonary effort is normal.      Breath sounds: Normal breath sounds.   Abdominal:      General: Abdomen is flat. Bowel sounds are normal.      Palpations: Abdomen is soft.   Musculoskeletal:         General: Normal range of motion.      Cervical back: Normal range of motion.   Skin:     General: Skin is warm and dry.   Neurological:      General: No focal deficit present.      Mental Status: She is alert and oriented to person, place, and time.   Psychiatric:         Mood and Affect: Mood normal.         Behavior: Behavior normal.               PFTs:  (independently reviewed and interpreted by me)  10/21/20  FVC 2.22L,  69%  FEV1 1.73L,  69%  Ratio 78%  TLC 2.77L,  53%  DLCO 15.66L,  61%  Variable effort.  Mild restriction. Mildly reduced diffusing capacity. No comparative data available.    7/29/22  FVC 2.33L, 73%  FEV1 1.84L, 74%  Ratio 67%  +BDR  TLC 3.65L, 69%  DLCO 47%  Mild reversible obstruction and mild restriction, significant BDR, decreased lung volumes, moderate diffusion impairment     Radiology (independently reviewed and interpreted by me):   CT chest  5/20/2020 - small focus of nodularity posterior right upper lobe, lower lobe predominant interstitial opacity with air bronchograms and traction bronchiectasis, numerous calcified granulomas bilaterally, mildly enlarged mediastinal lymph nodes largest precarinal measuring 1.1 x 1.55 cm, small axillary and retropectoral lymph nodes largest measuring 8.5 mm, findings similar to 4/2/2016. Notable thoracic/lumbar scoliosis     Swallow study 8/10/22- Minimal Dysphagia- video swallow shows slight deviance from a normal swallow. Patient may report change in sensation during swallow. No change in diet is required.    Assessment & Plan     Diagnoses and all orders for this visit:    1. Chronic cough (Primary)         Discussion/ Recommendations:   No new abnormality on  the CXR. No entirely sure what is causing her symptom. She did just complete steroid course, and she always feels really good on steroids, so I think some of it may just be the return of general fatigue coming off the steroids. Vitals are stable and I don't hink there is anything acute going on    Will see her at her routine follow up in a few months    No follow-ups on file.          This document has been electronically signed by Mariah De Jesus DO on October 28, 2022 13:24 CDT

## 2022-11-14 RX ORDER — DICYCLOMINE HCL 20 MG
TABLET ORAL
Qty: 120 TABLET | Refills: 3 | Status: SHIPPED | OUTPATIENT
Start: 2022-11-14

## 2022-12-13 RX ORDER — FAMOTIDINE 20 MG/1
20 TABLET, FILM COATED ORAL DAILY
Qty: 30 TABLET | Refills: 3 | Status: SHIPPED | OUTPATIENT
Start: 2022-12-13

## 2022-12-15 RX ORDER — ALBUTEROL SULFATE 90 UG/1
2 AEROSOL, METERED RESPIRATORY (INHALATION) EVERY 4 HOURS PRN
Qty: 18 G | Refills: 2 | Status: SHIPPED | OUTPATIENT
Start: 2022-12-15

## 2022-12-15 RX ORDER — BUDESONIDE, GLYCOPYRROLATE, AND FORMOTEROL FUMARATE 160; 9; 4.8 UG/1; UG/1; UG/1
2 AEROSOL, METERED RESPIRATORY (INHALATION) 2 TIMES DAILY
Qty: 1 EACH | Refills: 11 | Status: SHIPPED | OUTPATIENT
Start: 2022-12-15

## 2022-12-15 RX ORDER — IPRATROPIUM BROMIDE AND ALBUTEROL SULFATE 2.5; .5 MG/3ML; MG/3ML
3 SOLUTION RESPIRATORY (INHALATION) EVERY 4 HOURS PRN
Qty: 360 ML | Refills: 5 | Status: SHIPPED | OUTPATIENT
Start: 2022-12-15

## 2022-12-29 DIAGNOSIS — J30.9 CHRONIC ALLERGIC RHINITIS: ICD-10-CM

## 2022-12-29 RX ORDER — FEXOFENADINE HCL 180 MG/1
180 TABLET ORAL DAILY
Qty: 30 TABLET | Refills: 5 | Status: SHIPPED | OUTPATIENT
Start: 2022-12-29

## 2023-01-16 ENCOUNTER — DOCUMENTATION (OUTPATIENT)
Dept: SLEEP MEDICINE | Facility: HOSPITAL | Age: 68
End: 2023-01-16
Payer: MEDICARE

## 2023-01-16 ENCOUNTER — TELEPHONE (OUTPATIENT)
Dept: SLEEP MEDICINE | Facility: HOSPITAL | Age: 68
End: 2023-01-16
Payer: MEDICARE

## 2023-01-16 NOTE — PROGRESS NOTES
Patient called with complaints of CPAP pressure being too strong. She is currently on autoCPAP 5-8 cm H2O.     PAP Data:  Time frame: 12/01/2022-01/15/2023   Compliance 85%  Average use on days used: 5 hrs 16 min  Percent of days with usage greater than or equal to 4 hours: 83%  PAP range : 5-8 cm H2O  Average 90% pressure: 7.8 cmH2O  Leak: 6.8 L/minute  Average AHI: 1.3 events/hr    Will decrease pressure to 5-7 cm H2O for now and turn ramp on-increase ramp time to 20 minutes.

## 2023-01-16 NOTE — TELEPHONE ENCOUNTER
Left message letting pt know that ade has changed her pressure to 5/7 and also turned on her ramp  For 20 minutes she should try this for a week if no better give us a call back

## 2023-01-18 ENCOUNTER — OFFICE VISIT (OUTPATIENT)
Dept: PULMONOLOGY | Facility: CLINIC | Age: 68
End: 2023-01-18
Payer: MEDICARE

## 2023-01-18 VITALS
WEIGHT: 170.9 LBS | OXYGEN SATURATION: 95 % | DIASTOLIC BLOOD PRESSURE: 82 MMHG | HEIGHT: 66 IN | SYSTOLIC BLOOD PRESSURE: 144 MMHG | HEART RATE: 85 BPM | BODY MASS INDEX: 27.47 KG/M2

## 2023-01-18 DIAGNOSIS — J41.0 SIMPLE CHRONIC BRONCHITIS: ICD-10-CM

## 2023-01-18 DIAGNOSIS — R05.3 CHRONIC COUGH: Primary | ICD-10-CM

## 2023-01-18 PROCEDURE — 99213 OFFICE O/P EST LOW 20 MIN: CPT | Performed by: INTERNAL MEDICINE

## 2023-01-18 NOTE — PROGRESS NOTES
Pulmonary Office Follow-up    Subjective     Genna Garcia is seen today at the office for   Chief Complaint   Patient presents with   • Cough       HPI  Genna Garcia is a 68 y.o. female with a PMH significant for bronciectasis.     1/18/22  Patient here for routine follow up. Since last visit, she got her CPAP machine and is getting used to that. She is using a nasal mask  Shes been using her nebs twice a day. She's been using Breztri and that is still working ok for her  Biggest concern today is lack of energy in the afternoons    10/28/22  Patient here for acute visit. She called yesterday with complaint of pleuritic pain on the right. She finished a course of prednisone recently. She got a CXR today that appeared at her baseline  She reports that for about the last week she feels a flutter in the back of her chest      10/4/22  Patient here for follow up. Since last visit, she had a swallow study that did not show evidence of aspiration. She also had a home sleep study that showed an AHI of 15. She had her CPAP titration study and needed a CPAP of 8. She's fairly claustrophobic so had a hard time with the mask    Patient tells me she got sick on Sept 4th. She had chills, maybe a fever, body aches, back pain, headaches. She had a mild cough. She started wheezing. She was put on doxycycline x 2, not given any steroids. Continues ot have wheezing and still short of breath and fatigued.    7/29/22  Patinet here to follow up on multiple tests. Had CT chest that showed unchanged chronic fibrotic changes in the bases. PFTs today. Swallow study hasn't been done yet (scheduled for the 10th)  She has been doing much better on the Breztri than the Breo, fels like breathing is much better. She also does a breathing treatment before meals and that helps her to cough less as well      7/11/22  Patient here to follow up after EGD for esophageal dilation. She feels like she is swallowing better and  having less chest pressure. Still coughing with eating though. She coughs other times too, but has more of a choking cough with eating. Does feel like the steroids helped as well.      6/14/22  Patient here for routine follow up. She reports that for the last few months, more chest proessure and trouble breahting. Neb treatments do help, she brings up more mucus with those. She is using Breo at night before bed.  She has been having more snoring lately and concerns about apneas  She has seen Dr Rondon, has an EGD scheduled for July 5th with plans for a dilation  She saw allergy and was started on Singulair. She was told she couldn't get skin testing done because she would have to stop all of her antihistamines for two weeks and she doesn't feel she can stop them for that long    12/16/21  Patient here for follow up. She is doing pretty well from a breathing standpoint. Has episodes of getting more winded. She notices it mostly when the weather is bad. She's still doing her Breo daily in the afternoon which helps. Her asthma seems well controlled. She does feel like her allergies are worse though. She used to get allergy shots and had to stop when she broke her arm. She would like to see a new allergist about getting back on IT schedule  She saw GI and had an esophageal dilation again since last visit. She is eating better. However she still has coughing with eating    9/24/21  Patient here for follow up. At last visit I ordered her Symbicort but her insurance wouldn't cover it. She apparently got generic low dose Symbicort though, and this gave her severe headaches. Went back to Breo but taking it later in the day and that worked better  She continues to have coughing when she eats. She didn't discuss this with her GI doc at last appointment but she is going to see him again    Last OV 7/1/21  Last seen by Dr Evans in Feb  Patient with multiple overlapping issues going on. Essentially she has chronic productive  cough from several sources. She has reflux and symptoms of aspiration (coughing with/after eating), and the lower lobe fibrosis on her Ct scan supports this. I can't find a swallow study anywhere. She has esophageal strictures that she has to have stretched periodically   She has allergies, previously on allergy shots but can't afford them now. She has seen ENT before, was going to have sinus surgery but held off for other medical reasons and now her ENT doctor has left. She has run out of her nasal sprays and Allegra.  She is on Breo and that helps for most of the morning but by late afternoon she is having wheezing and coughing again. She uses Duonebs and Combivent as needed    Tobacco use history:  Type: cigarettes  Amount: 2-3 ppd  Duration: 18 years  Cessation: 2002   Willing to quit: N/A      Patient Active Problem List   Diagnosis   • Scoliosis of thoracolumbar spine   • Other dysphagia   • Pain in joint of right shoulder   • Change in bowel habits   • Chronic cough   • Chronic allergic rhinitis   • Severe allergic reaction with respiratory distress   • Senile osteoporosis   • Gastroesophageal reflux disease without esophagitis   • History of esophageal stricture   • History of esophageal dilatation   • History of tobacco abuse   • Postmenopausal   • Early menopause occurring in patient age younger than 45 years   • Irritable bowel syndrome   • Chronic obstructive pulmonary disease (HCC)   • Long-term use of high-risk medication   • Overweight with body mass index (BMI) of 27 to 27.9 in adult   • Chronic bilateral low back pain         Medications, Allergies, Social, and Family Histories reviewed as per EMR.    Objective     Vitals:    01/18/23 1038   BP: 144/82   Pulse: 85   SpO2: 95%         01/18/23  1038   Weight: 77.5 kg (170 lb 14.4 oz)     [unfilled]  Physical Exam  Vitals reviewed.   Constitutional:       Appearance: Normal appearance.   HENT:      Head: Normocephalic and atraumatic.      Nose:  Nose normal.      Mouth/Throat:      Mouth: Mucous membranes are moist.      Pharynx: Oropharynx is clear.   Eyes:      Conjunctiva/sclera: Conjunctivae normal.      Pupils: Pupils are equal, round, and reactive to light.   Cardiovascular:      Rate and Rhythm: Normal rate and regular rhythm.      Pulses: Normal pulses.      Heart sounds: Normal heart sounds.   Pulmonary:      Effort: Pulmonary effort is normal.      Breath sounds: Normal breath sounds.   Abdominal:      General: Abdomen is flat. Bowel sounds are normal.      Palpations: Abdomen is soft.   Musculoskeletal:         General: Normal range of motion.      Cervical back: Normal range of motion.   Skin:     General: Skin is warm and dry.   Neurological:      General: No focal deficit present.      Mental Status: She is alert and oriented to person, place, and time.   Psychiatric:         Mood and Affect: Mood normal.         Behavior: Behavior normal.               PFTs:  (independently reviewed and interpreted by me)  10/21/20  FVC 2.22L,  69%  FEV1 1.73L,  69%  Ratio 78%  TLC 2.77L,  53%  DLCO 15.66L,  61%  Variable effort.  Mild restriction. Mildly reduced diffusing capacity. No comparative data available.    7/29/22  FVC 2.33L, 73%  FEV1 1.84L, 74%  Ratio 67%  +BDR  TLC 3.65L, 69%  DLCO 47%  Mild reversible obstruction and mild restriction, significant BDR, decreased lung volumes, moderate diffusion impairment     Radiology (independently reviewed and interpreted by me):   CT chest  5/20/2020 - small focus of nodularity posterior right upper lobe, lower lobe predominant interstitial opacity with air bronchograms and traction bronchiectasis, numerous calcified granulomas bilaterally, mildly enlarged mediastinal lymph nodes largest precarinal measuring 1.1 x 1.55 cm, small axillary and retropectoral lymph nodes largest measuring 8.5 mm, findings similar to 4/2/2016. Notable thoracic/lumbar scoliosis     Swallow study 8/10/22- Minimal Dysphagia- video  swallow shows slight deviance from a normal swallow. Patient may report change in sensation during swallow. No change in diet is required.    Assessment & Plan     Diagnoses and all orders for this visit:    1. Chronic cough (Primary)    2. Simple chronic bronchitis (HCC)         Discussion/ Recommendations:   Patient stable from a pulmonary standpoint. Continue Breztri and nebs for now. No new recs      Return in about 6 months (around 7/18/2023) for f/u COPD.          This document has been electronically signed by Mariah De Jesus DO on January 18, 2023 10:53 CST

## 2023-03-02 ENCOUNTER — OFFICE VISIT (OUTPATIENT)
Dept: SLEEP MEDICINE | Facility: HOSPITAL | Age: 68
End: 2023-03-02
Payer: MEDICARE

## 2023-03-02 VITALS
HEIGHT: 66 IN | WEIGHT: 174.9 LBS | DIASTOLIC BLOOD PRESSURE: 83 MMHG | HEART RATE: 95 BPM | SYSTOLIC BLOOD PRESSURE: 152 MMHG | BODY MASS INDEX: 28.11 KG/M2 | OXYGEN SATURATION: 95 %

## 2023-03-02 DIAGNOSIS — G47.33 OSA (OBSTRUCTIVE SLEEP APNEA): Primary | ICD-10-CM

## 2023-03-02 PROCEDURE — 99213 OFFICE O/P EST LOW 20 MIN: CPT | Performed by: NURSE PRACTITIONER

## 2023-03-02 NOTE — PROGRESS NOTES
Sleep Clinic Follow Up    Date: 3/2/2023  Primary Care Provider: Skylar Ferrer APRN    Last office visit: 10/19/2022 (I reviewed this note)    CC: Follow up: DORA started on CPAP, new machine       Interim History:  Since the last visit:    1) moderate DORA -  Genna Garcia has remained compliant with CPAP. She denies mask and machine issues, dry mouth, headaches, or pressures intolerance. She denies abnormal dreams, sleep paralysis, nasal congestion, URI sx.  Since starting on CPAP she reports less daytime sleepiness. Has had difficulty adjusting but doing well now. Has had several nigths where she did not use due to nasal congestion or nose bleeding. Resolved.      States pressure of 5 is not enough but 8 is too much.     Sleep Testin. HST on 2022, AHI of 15   2. CPAP titration on 2022, recommended 8 cm H2O   3. Currently on 5-7 cm H2O    PAP Data:    Time frame: 2022-2023   Compliance: 89 %  Average use on days used: 5hrs 20 min  Percent of days with usage greater than or equal to 4 hours: 89%  PAP range: 5-8 cm H2O  Average 90% pressure: 7.4 cmH2O  Leak: 21.5 L/ minutes  Average AHI: 1.2 events/hr  Mask type: Full face mask  DME: [facility]     Bed time: 2300  Sleep latency: 20 minutes  Number of times awakens during the night: 1  Wake time: 0700  Estimated total sleep time at night: 6 hours  Caffeine intake: 1 cups of coffee, 1 cups of tea, and 0 sodas per day  Alcohol intake: 0 drinks per week  Nap time: denies    Sleepiness with Driving: denies      Parnell - 5        PMHx, FH, SH reviewed and pertinent changes are: reports unchanged from last office visit on 10/19/2022      REVIEW OF SYSTEMS:   Negative for chest pain, SOA, fever, chills, cough, N/V/D, abdominal pain.    Smoking:none  , former          Exam:  Vitals:    23 1143   BP: 152/83   Pulse: 95   SpO2: 95%           23  1143   Weight: 79.3 kg (174 lb 14.4 oz)     Body mass index is 28.65 kg/m².  "BMI is >= 25 and <30. (Overweight) The following options were offered after discussion;: nutrition counseling/recommendations      Gen:                No distress, conversant, pleasant, appears stated age, alert, oriented  Eyes:               Anicteric sclera, moist conjunctiva, no lid lag                           EOMI   Lungs:             normal effort, non-labored breathing                          Clear to auscultation bilaterally          CV:                  Normal S1/S2, no murmur                          no lower extremity edema                 Psych:             Appropriate affect  Neuro:             CN 2-12 appear intact    Past Medical History:   Diagnosis Date   • Anesthesia complication     states she \"stopped breathing during her last procedure\"   • Arthritis    • Asthma    • Elevated cholesterol    • Endometriosis    • Falls    • GERD (gastroesophageal reflux disease)    • Osteoporosis    • Pulmonary fibrosis (HCC)    • Scoliosis of thoracolumbar spine    • Seasonal rhinitis    • Sinusitis        Current Outpatient Medications:   •  acetaminophen-codeine (TYLENOL #3) 300-30 MG per tablet, Take 1 tablet by mouth 2 (Two) Times a Day As Needed for Moderate Pain ., Disp: 60 tablet, Rfl: 0  •  albuterol sulfate HFA (Ventolin HFA) 108 (90 Base) MCG/ACT inhaler, Inhale 2 puffs Every 4 (Four) Hours As Needed for Wheezing or Shortness of Air., Disp: 18 g, Rfl: 2  •  benzonatate (Tessalon Perles) 100 MG capsule, Take 1 capsule by mouth 3 (Three) Times a Day As Needed for Cough., Disp: 30 capsule, Rfl: 0  •  Biotin 1 MG capsule, Take 1 capsule by mouth Daily., Disp: , Rfl:   •  Budeson-Glycopyrrol-Formoterol (Breztri Aerosphere) 160-9-4.8 MCG/ACT aerosol inhaler, Inhale 2 puffs 2 (Two) Times a Day., Disp: 1 each, Rfl: 11  •  Calcium-Magnesium-Vitamin D - MG-MG-UNIT tablet sustained-release 24 hour, Take 2 tablets by mouth Daily., Disp: , Rfl:   •  Cod Liver Oil capsule, Take  by mouth., Disp: , Rfl: "   •  Cranberry 500 MG tablet, Take 500 mg by mouth 2 (two) times a day., Disp: , Rfl:   •  dicyclomine (BENTYL) 20 MG tablet, TAKE ONE TABLET BY MOUTH EVERY 6 HOURS, Disp: 120 tablet, Rfl: 3  •  famotidine (PEPCID) 20 MG tablet, TAKE 1 TABLET BY MOUTH DAILY., Disp: 30 tablet, Rfl: 3  •  fexofenadine (Allegra Allergy) 180 MG tablet, Take 1 tablet by mouth Daily., Disp: 30 tablet, Rfl: 5  •  fluconazole (DIFLUCAN) 150 MG tablet, Take 1 tablet by mouth Take As Directed. Take on tab PO at onset of sx; may repeat dose in 72 hours if needed, Disp: 2 tablet, Rfl: 0  •  fluticasone (CUTIVATE) 0.005 % ointment, , Disp: , Rfl:   •  fluticasone (FLONASE) 50 MCG/ACT nasal spray, 2 sprays into each nostril daily, Disp: 16 mL, Rfl: 5  •  ipratropium-albuterol (DUO-NEB) 0.5-2.5 mg/3 ml nebulizer, Take 3 mL by nebulization Every 4 (Four) Hours As Needed for Wheezing or Shortness of Air., Disp: 360 mL, Rfl: 5  •  montelukast (SINGULAIR) 10 MG tablet, Take 10 mg by mouth Daily., Disp: , Rfl:   •  mupirocin (BACTROBAN) 2 % ointment, Apply 1 application topically to the appropriate area as directed 2 (Two) Times a Day., Disp: 30 g, Rfl: 0  •  olopatadine (PATANASE) 0.6 % solution nasal solution, 2 sprays by Each Nare route 2 (Two) Times a Day., Disp: 30 g, Rfl: 5  •  pantoprazole (PROTONIX) 40 MG EC tablet, Take 1 tablet by mouth Daily., Disp: 30 tablet, Rfl: 5  •  vitamin D3 125 MCG (5000 UT) capsule capsule, Take 5,000 Units by mouth Daily. Takes 2 daily, Disp: , Rfl:       Assessment and Plan:    1. Obstructive sleep apnea- Established, stable   1. Compliant with PAP therapy- compliance report reviewed with patient   2. Pressure change 6-7 cm h2O  3. Continue PAP as prescribed  4. Script for PAP supplies  5. Pertinent labs reviewed   6. Drowsy driving tips- do not drive if feeling sleepy   7. Return to clinic in 6 months with compliance report unless change in symptoms in interim period  2. COPD          I spent 20 minutes caring  for Genna on this date of service. This time includes time spent by me in the following activities: preparing for the visit, obtaining and/or reviewing a separately obtained history, performing a medically appropriate examination and/or evaluation, counseling and educating the patient/family/caregiver, ordering medications, tests, or procedures and documenting information in the medical record. Educated on PAP management, maintenance, and compliance.           This document has been electronically signed by MARCELLA Jeronimo on March 2, 2023 11:44 CST            CC: Skylar Ferrer APRN          No ref. provider found

## 2023-03-06 ENCOUNTER — OFFICE VISIT (OUTPATIENT)
Dept: ORTHOPEDIC SURGERY | Facility: CLINIC | Age: 68
End: 2023-03-06
Payer: MEDICARE

## 2023-03-06 VITALS — HEIGHT: 66 IN | BODY MASS INDEX: 28.12 KG/M2 | WEIGHT: 175 LBS

## 2023-03-06 DIAGNOSIS — Z78.0 POSTMENOPAUSAL: ICD-10-CM

## 2023-03-06 DIAGNOSIS — M81.0 AGE-RELATED OSTEOPOROSIS WITHOUT CURRENT PATHOLOGICAL FRACTURE: Primary | ICD-10-CM

## 2023-03-06 DIAGNOSIS — K21.9 GASTROESOPHAGEAL REFLUX DISEASE WITHOUT ESOPHAGITIS: ICD-10-CM

## 2023-03-06 DIAGNOSIS — R13.19 OTHER DYSPHAGIA: ICD-10-CM

## 2023-03-06 DIAGNOSIS — K58.9 IRRITABLE BOWEL SYNDROME, UNSPECIFIED TYPE: ICD-10-CM

## 2023-03-06 DIAGNOSIS — Z87.19 HISTORY OF ESOPHAGEAL STRICTURE: ICD-10-CM

## 2023-03-06 DIAGNOSIS — Z87.891 HISTORY OF TOBACCO ABUSE: ICD-10-CM

## 2023-03-06 DIAGNOSIS — Z98.890 HISTORY OF ESOPHAGEAL DILATATION: ICD-10-CM

## 2023-03-06 DIAGNOSIS — E28.319 EARLY MENOPAUSE OCCURRING IN PATIENT AGE YOUNGER THAN 45 YEARS: ICD-10-CM

## 2023-03-06 DIAGNOSIS — J44.9 CHRONIC OBSTRUCTIVE PULMONARY DISEASE, UNSPECIFIED COPD TYPE: ICD-10-CM

## 2023-03-06 PROCEDURE — 99214 OFFICE O/P EST MOD 30 MIN: CPT | Performed by: NURSE PRACTITIONER

## 2023-03-06 NOTE — PROGRESS NOTES
Genna Garcia is a 68 y.o. female returns for     Chief Complaint   Patient presents with   • Osteoporosis     [facility] follow [facility]     HISTORY OF PRESENT ILLNESS:  [facility] follow-up of osteoporosis.  Patient initially established care with the [facility] on 3/7/2022.  It is unclear as to when she was diagnosed with osteoporosis but it was previously noted on her 2018 DEXA scan.  Patient has previously tried both Actonel and Fosamax and had intolerance of both of these medications due to side effects including severe myalgias as well as exacerbation of reflux.  Due to this history, I have advised against any further oral bisphosphonate.  Prolia injections were ordered for the patient for further treatment of osteoporosis and she received her initial Prolia injection on 4/4/2022.  Patient is tolerating the Prolia well with no known adverse effects.    Previously identified risk factors for osteoporosis include her age, race, gender, postmenopausal status, markedly early menopause due to a full hysterectomy at the age of 26-27 with only 1 year of hormone replacement therapy, positive family history in her mother, former heavy tobacco use of 2-1/2 packs/day for approximately 20 years (she did successfully quit smoking in 2002) and comorbid medical conditions including COPD, IBS and vitamin D deficiency.  Patient has a history of a prior fragility fracture (right distal radius fracture requiring ORIF due to a fall from a standing height).    Patient continues to take calcium supplementation 600 mg twice daily for total of 1200 mg (calcium/magnesium/vitamin D supplement).  Patient has had no issues with hypocalcemia since starting Prolia injections.  Patient continues to take vitamin D supplementation of 5000 units daily in addition to the vitamin D component of her combination supplement.  Patient has a history of vitamin D deficiency.  Her last vitamin D  "level was 26.9 as of 1/18/2022.  No new complaints or concerns noted since last office visit.  Patient denies any known changes to her bone health and has not sustained any fractures since her last office visit.     CONCURRENT MEDICAL HISTORY:    The following portions of the patient's history were reviewed and updated as appropriate: allergies, current medications, past family history, past medical history, past social history, past surgical history and problem list.     ROS  No fevers or chills.  No chest pain or shortness of air.  No GI or  disturbances.    PHYSICAL EXAMINATION:       Ht 166.4 cm (65.5\")   Wt 79.4 kg (175 lb)   BMI 28.68 kg/m²     Physical Exam  Vitals reviewed.   Constitutional:       General: She is not in acute distress.     Appearance: She is well-developed. She is not ill-appearing.   HENT:      Head: Normocephalic.   Pulmonary:      Effort: Pulmonary effort is normal. No respiratory distress.   Abdominal:      General: There is no distension.      Palpations: Abdomen is soft.   Skin:     General: Skin is warm and dry.   Neurological:      Mental Status: She is alert and oriented to person, place, and time.      GCS: GCS eye subscore is 4. GCS verbal subscore is 5. GCS motor subscore is 6.   Psychiatric:         Speech: Speech normal.         Behavior: Behavior normal.         Thought Content: Thought content normal.         Judgment: Judgment normal.         GAIT:     [x]  Normal  []  Antalgic    Assistive device: [x]  None  []  Walker     []  Crutches  []  Cane     []  Wheelchair  []  Stretcher    Back Exam     Other   Gait: normal     Comments:  No kyphosis or obvious deformity noted.  No focal neurological deficits noted.              Vitamin D 25 hydroxy     Specimen Information: Blood    2 Result Notes      Component  Ref Range & Units 1 yr ago   25 Hydroxy, Vitamin D  ng/ml 26.9    Resulting Agency [facility]           Narrative  Performed by: [facility]  Reference Range for Total " Vitamin D 25(OH)     Deficiency <20.0 ng/mL   Insufficiency 21-29 ng/mL   Sufficiency  ng/mL   Toxicity >100 ng/ml     Results may be falsely increased if patient taking Biotin.       Specimen Collected: 01/18/22 09:37 CST Last Resulted: 01/18/22 19:33 CST               ASSESSMENT:    Diagnoses and all orders for this visit:    Age-related osteoporosis without current pathological fracture  -     Cancel: DEXA Bone Density Axial; Future  -     DEXA Bone Density Axial; Future    History of esophageal stricture    Gastroesophageal reflux disease without esophagitis    Other dysphagia    Irritable bowel syndrome, unspecified type    History of esophageal dilatation    Postmenopausal  -     Cancel: DEXA Bone Density Axial; Future  -     DEXA Bone Density Axial; Future    Early menopause occurring in patient age younger than 45 years    Chronic obstructive pulmonary disease, unspecified COPD type (HCC)    History of tobacco abuse    PLAN    Patient is doing well overall since last office visit and is tolerating her current osteoporosis treatment with Prolia injections well with no known adverse effects.  Patient has had 2 Prolia injections thus far with no known issues.  Prior to starting Prolia, she had previously tried both Fosamax and Actonel but did not tolerate these medications and developed severe myalgias as well as exacerbation of reflux.  Recommend continue with Prolia injections for treatment of osteoporosis.  We discussed that osteoporosis is a chronic, progressive and silent disease that typically requires long-term management.  We discussed that proper treatment of osteoporosis can help to prevent worsening osteoporosis, prevent further bone loss and reduce her risk for fracture.  Recommend a repeat DEXA scan to evaluate the efficacy of the Prolia as she has now been on Prolia for 1 year.  We discussed that I will mail her the results.    Recommend to continue with her calcium and vitamin D  supplementation daily.  We discussed the importance of taking the calcium supplement for support of good bone health but also to prevent low blood calcium while taking Prolia injections.  The patient has not had any issues with hypocalcemia since starting Prolia.  The patient does have a history of vitamin D deficiency and her last level remained somewhat low at 26.9 as of 1/18/2022 despite her daily supplementation.  Patient will need an updated vitamin D level in the near future and we can get that with her next scheduled labs.  Recommend to continue with efforts for consistent weightbearing exercise, such as walking, which will help to keep her bones and muscle strong and prevent worsening osteoporosis.  As previously noted, patient successfully quit smoking in 2002 and has now been a non-smoker for over 20 years.  I encouraged her to maintain her smoking cessation as smoking can worsen osteoporosis.    Follow-up in 1 year for recheck with the [facility].  Patient is encouraged to call with any questions, concerns or issues that may arise in the interim.    Time spent of a minimum of 30 minutes including the face to face evaluation, reviewing of medical history and prior medial records, reviewing of diagnostic studies, ordering additional tests, prescription drug management, documentation, patient education and coordination of care.     EMR Dragon/Transciption Disclaimer: Some of this note may be an electronic transcription/translation of spoken language to printed text using the Dragon Dictation System.     Return in about 1 year (around 3/6/2024) for Recheck.        This document has been electronically signed by MARCELLA Potter on March 7, 2023 18:43 CST      MARCELLA Potter

## 2023-03-10 DIAGNOSIS — R05.9 COUGH: ICD-10-CM

## 2023-03-10 RX ORDER — BENZONATATE 100 MG/1
100 CAPSULE ORAL 3 TIMES DAILY PRN
Qty: 30 CAPSULE | Refills: 1 | Status: SHIPPED | OUTPATIENT
Start: 2023-03-10

## 2023-03-20 ENCOUNTER — DOCUMENTATION (OUTPATIENT)
Dept: PULMONOLOGY | Facility: CLINIC | Age: 68
End: 2023-03-20
Payer: MEDICARE

## 2023-03-20 NOTE — PROGRESS NOTES
Received fax from [facility] in [place], [place] stating that Ms. Walker Duo Neb nebulizer solution is on back order.  Per Charlene NARANJO, [facility] and told them it is ok to do separate meds, ( Ipratropium and albuterol) to make up the Duo Nebs.   Colchicine Counseling:  Patient counseled regarding adverse effects including but not limited to stomach upset (nausea, vomiting, stomach pain, or diarrhea).  Patient instructed to limit alcohol consumption while taking this medication.  Colchicine may reduce blood counts especially with prolonged use.  The patient understands that monitoring of kidney function and blood counts may be required, especially at baseline. The patient verbalized understanding of the proper use and possible adverse effects of colchicine.  All of the patient's questions and concerns were addressed.

## 2023-03-28 DIAGNOSIS — J41.0 SIMPLE CHRONIC BRONCHITIS: Primary | ICD-10-CM

## 2023-03-28 RX ORDER — ALBUTEROL SULFATE 2.5 MG/3ML
2.5 SOLUTION RESPIRATORY (INHALATION) 4 TIMES DAILY PRN
Qty: 180 ML | Refills: 3 | Status: SHIPPED | OUTPATIENT
Start: 2023-03-28

## 2023-03-29 ENCOUNTER — LAB (OUTPATIENT)
Dept: LAB | Facility: HOSPITAL | Age: 68
End: 2023-03-29
Payer: MEDICARE

## 2023-03-29 DIAGNOSIS — M81.0 AGE-RELATED OSTEOPOROSIS WITHOUT CURRENT PATHOLOGICAL FRACTURE: ICD-10-CM

## 2023-03-29 DIAGNOSIS — M81.0 AGE-RELATED OSTEOPOROSIS WITHOUT CURRENT PATHOLOGICAL FRACTURE: Primary | ICD-10-CM

## 2023-03-29 LAB — CALCIUM SPEC-SCNC: 9.5 MG/DL (ref 8.6–10.5)

## 2023-03-29 PROCEDURE — 82310 ASSAY OF CALCIUM: CPT

## 2023-04-03 DIAGNOSIS — M81.0 AGE-RELATED OSTEOPOROSIS WITHOUT CURRENT PATHOLOGICAL FRACTURE: Primary | ICD-10-CM

## 2023-04-05 DIAGNOSIS — J45.41 MODERATE PERSISTENT ASTHMA WITH EXACERBATION: Primary | ICD-10-CM

## 2023-04-05 RX ORDER — PREDNISONE 10 MG/1
TABLET ORAL
Qty: 31 TABLET | Refills: 0 | Status: SHIPPED | OUTPATIENT
Start: 2023-04-05 | End: 2023-04-17

## 2023-04-05 NOTE — PROGRESS NOTES
Called with worsening wheezing and dyspnea. No increased sputum production, fever, chills, or signs of infection.   Requesting Prednisone RX.   Will give 10 day taper.

## 2023-04-06 ENCOUNTER — INFUSION (OUTPATIENT)
Dept: ONCOLOGY | Facility: HOSPITAL | Age: 68
End: 2023-04-06
Payer: MEDICARE

## 2023-04-06 VITALS — DIASTOLIC BLOOD PRESSURE: 61 MMHG | SYSTOLIC BLOOD PRESSURE: 133 MMHG | RESPIRATION RATE: 18 BRPM | HEART RATE: 97 BPM

## 2023-04-06 DIAGNOSIS — M81.0 AGE-RELATED OSTEOPOROSIS WITHOUT CURRENT PATHOLOGICAL FRACTURE: Primary | ICD-10-CM

## 2023-04-06 PROCEDURE — 96372 THER/PROPH/DIAG INJ SC/IM: CPT | Performed by: NURSE PRACTITIONER

## 2023-04-06 PROCEDURE — 25010000002 DENOSUMAB 60 MG/ML SOLUTION PREFILLED SYRINGE: Performed by: NURSE PRACTITIONER

## 2023-04-06 RX ADMIN — DENOSUMAB 60 MG: 60 INJECTION SUBCUTANEOUS at 14:16

## 2023-04-13 ENCOUNTER — OFFICE VISIT (OUTPATIENT)
Dept: GASTROENTEROLOGY | Facility: CLINIC | Age: 68
End: 2023-04-13
Payer: MEDICARE

## 2023-04-13 VITALS
BODY MASS INDEX: 28.64 KG/M2 | HEART RATE: 82 BPM | SYSTOLIC BLOOD PRESSURE: 120 MMHG | DIASTOLIC BLOOD PRESSURE: 75 MMHG | WEIGHT: 178.2 LBS | HEIGHT: 66 IN

## 2023-04-13 DIAGNOSIS — R13.19 OTHER DYSPHAGIA: Primary | ICD-10-CM

## 2023-04-13 PROCEDURE — 99214 OFFICE O/P EST MOD 30 MIN: CPT | Performed by: INTERNAL MEDICINE

## 2023-04-13 RX ORDER — DEXTROSE AND SODIUM CHLORIDE 5; .45 G/100ML; G/100ML
30 INJECTION, SOLUTION INTRAVENOUS CONTINUOUS PRN
Status: CANCELLED | OUTPATIENT
Start: 2023-04-20

## 2023-04-13 NOTE — H&P (VIEW-ONLY)
"[facility]      Chief Complaint:   Chief Complaint   Patient presents with   • Difficulty Swallowing   • Heartburn     6 Month follow up       Subjective     HPI:   Patient with dysphagia.  Patient has had esophageal strictures in the past and dilatation markedly improves them.  Patient is on proton pump inhibitor and an H2 blocker.    Plan; we will schedule patient for EGD with dilatation    Past Medical History:   Past Medical History:   Diagnosis Date   • Anesthesia complication     states she \"stopped breathing during her last procedure\"   • Arthritis    • Asthma    • Elevated cholesterol    • Endometriosis    • Falls    • GERD (gastroesophageal reflux disease)    • Osteoporosis    • Pulmonary fibrosis    • Scoliosis of thoracolumbar spine    • Seasonal rhinitis    • Sinusitis        Past Surgical History:  Past Surgical History:   Procedure Laterality Date   • APPENDECTOMY     • COLONOSCOPY  07/05/2022    Per Dr. Evan Chavez M.D., [facility], [place], [place].   • COLONOSCOPY N/A 06/30/2020    Procedure: COLONOSCOPY;  Surgeon: Evan Chavez MD;  Location: [facility] ENDOSCOPY;  Service: Gastroenterology;  Laterality: N/A;   • ENDOSCOPY N/A 10/16/2019    Procedure: ESOPHAGOGASTRODUODENOSCOPY;  Surgeon: Evan Chavez MD;  Location: [facility] ENDOSCOPY;  Service: Gastroenterology   • ENDOSCOPY N/A 06/30/2020    Procedure: ESOPHAGOGASTRODUODENOSCOPY;  Surgeon: Evan Chavez MD;  Location: [facility] ENDOSCOPY;  Service: Gastroenterology;  Laterality: N/A;  savory dilation 48-54   • ENDOSCOPY N/A 11/17/2021    Procedure: ESOPHAGOGASTRODUODENOSCOPY;  Surgeon: Evan Chavez MD;  Location: [facility] ENDOSCOPY;  Service: Gastroenterology;  Laterality: N/A;  48-54 f eso dil. blue   • ENDOSCOPY N/A 7/5/2022    Procedure: ESOPHAGOGASTRODUODENOSCOPY;  Surgeon: Evan Chavez MD;  Location: [facility] ENDOSCOPY;  Service: Gastroenterology;  Laterality: N/A;   • " FOREARM SURGERY     • HYSTERECTOMY  1984    total   • SALPINGO OOPHORECTOMY     • UPPER GASTROINTESTINAL ENDOSCOPY  10/16/2019   • UPPER GASTROINTESTINAL ENDOSCOPY  06/30/2020   • UPPER GASTROINTESTINAL ENDOSCOPY  11/17/2021   • WRIST FRACTURE SURGERY Right        Family History:  Family History   Problem Relation Age of Onset   • Heart failure Mother    • Thyroid disease Mother    • Ulcerative colitis Father        Social History:   reports that she quit smoking about 21 years ago. Her smoking use included cigarettes. She started smoking about 41 years ago. She has a 50.00 pack-year smoking history. She has never used smokeless tobacco. She reports that she does not currently use alcohol. She reports that she does not use drugs.    Medications:   Prior to Admission medications    Medication Sig Start Date End Date Taking? Authorizing Provider   acetaminophen-codeine (TYLENOL #3) 300-30 MG per tablet Take 1 tablet by mouth 2 (Two) Times a Day As Needed for Moderate Pain . 7/21/22  Yes Neena Kee APRN   albuterol (PROVENTIL) (2.5 MG/3ML) 0.083% nebulizer solution Take 2.5 mg by nebulization 4 (Four) Times a Day As Needed for Wheezing. Mix with atrovent 3/28/23  Yes Charlene Sheffield APRN   albuterol sulfate HFA (Ventolin HFA) 108 (90 Base) MCG/ACT inhaler Inhale 2 puffs Every 4 (Four) Hours As Needed for Wheezing or Shortness of Air. 12/15/22  Yes Mariah De Jesus DO   benzonatate (Tessalon Perles) 100 MG capsule Take 1 capsule by mouth 3 (Three) Times a Day As Needed for Cough. 3/10/23  Yes Mariah De Jesus DO   Biotin 1 MG capsule Take 1 capsule by mouth Daily.   Yes ProviderRoosevelt MD   Budeson-Glycopyrrol-Formoterol (Breztri Aerosphere) 160-9-4.8 MCG/ACT aerosol inhaler Inhale 2 puffs 2 (Two) Times a Day. 12/15/22  Yes Mariah De Jesus DO   Calcium-Magnesium-Vitamin D - MG-MG-UNIT tablet sustained-release 24 hour Take 2 tablets by mouth Daily.   Yes ProviderRoosevelt MD   Cod Liver  Oil capsule Take  by mouth.   Yes Roosevelt Bryant MD   Cranberry 500 MG tablet Take 500 mg by mouth 2 (two) times a day.   Yes Roosevelt Bryant MD   dicyclomine (BENTYL) 20 MG tablet TAKE ONE TABLET BY MOUTH EVERY 6 HOURS 11/14/22  Yes Evan Rondon MD   famotidine (PEPCID) 20 MG tablet TAKE 1 TABLET BY MOUTH DAILY. 12/13/22  Yes Evan Rondon MD   fexofenadine (Allegra Allergy) 180 MG tablet Take 1 tablet by mouth Daily. 12/29/22  Yes Mariah De Jesus DO   fluconazole (DIFLUCAN) 150 MG tablet Take 1 tablet by mouth Take As Directed. Take on tab PO at onset of sx; may repeat dose in 72 hours if needed 9/19/22  Yes Neena Kee APRN   fluticasone (CUTIVATE) 0.005 % ointment  8/31/22  Yes Roosevelt Bryant MD   fluticasone (FLONASE) 50 MCG/ACT nasal spray 2 sprays into each nostril daily 7/1/21  Yes Mariah De Jesus DO   ipratropium (ATROVENT) 0.02 % nebulizer solution Take 2.5 mL by nebulization 4 (Four) Times a Day As Needed for Wheezing or Shortness of Air. Mix with albuterol 3/28/23  Yes Charlene Sheffield APRN   ipratropium-albuterol (DUO-NEB) 0.5-2.5 mg/3 ml nebulizer Take 3 mL by nebulization Every 4 (Four) Hours As Needed for Wheezing or Shortness of Air. 12/15/22  Yes Mariah De Jesus DO   montelukast (SINGULAIR) 10 MG tablet Take 1 tablet by mouth Daily. 3/1/22  Yes Roosevelt Bryant MD   olopatadine (PATANASE) 0.6 % solution nasal solution 2 sprays by Each Nare route 2 (Two) Times a Day. 7/1/21  Yes Mariah De Jesus DO   pantoprazole (PROTONIX) 40 MG EC tablet Take 1 tablet by mouth Daily. 10/13/22  Yes Evan Rondon MD   predniSONE (DELTASONE) 10 MG tablet Take 4 tabs daily x 3 days, then take 3 tabs daily x 3 days, then take 2 tabs daily x 3 days, then take 1 tab daily x 3 days 4/5/23  Yes Charlene Sheffield APRN   vitamin D3 125 MCG (5000 UT) capsule capsule Take 1 capsule by mouth Daily. Takes 2 daily   Yes Provider, Historical, MD       Allergies:  Sulfa antibiotics,  "Levaquin [levofloxacin], Augmentin [amoxicillin-pot clavulanate], and Cefuroxime    ROS:    Review of Systems   Constitutional: Negative for activity change, appetite change, chills, diaphoresis, fatigue, fever and unexpected weight change.   HENT: Positive for trouble swallowing. Negative for sore throat.    Respiratory: Negative for shortness of breath.    Gastrointestinal: Negative for abdominal distention, abdominal pain, anal bleeding, blood in stool, constipation, diarrhea, nausea, rectal pain and vomiting.   Endocrine: Negative for polydipsia, polyphagia and polyuria.   Genitourinary: Negative for difficulty urinating.   Musculoskeletal: Negative for arthralgias.   Skin: Negative for pallor.   Allergic/Immunologic: Negative for food allergies.   Neurological: Negative for weakness and light-headedness.   Psychiatric/Behavioral: Negative for behavioral problems.     Objective     Blood pressure 120/75, pulse 82, height 166.4 cm (65.5\"), weight 80.8 kg (178 lb 3.2 oz).    Physical Exam  Constitutional:       General: She is not in acute distress.     Appearance: She is well-developed. She is not diaphoretic.   HENT:      Head: Normocephalic and atraumatic.   Cardiovascular:      Rate and Rhythm: Normal rate and regular rhythm.      Heart sounds: Normal heart sounds. No murmur heard.    No friction rub. No gallop.   Pulmonary:      Effort: No respiratory distress.      Breath sounds: Normal breath sounds. No wheezing or rales.   Chest:      Chest wall: No tenderness.   Abdominal:      General: Bowel sounds are normal. There is no distension.      Palpations: Abdomen is soft. There is no mass.      Tenderness: There is no abdominal tenderness. There is no guarding or rebound.      Hernia: No hernia is present.   Musculoskeletal:         General: Normal range of motion.   Skin:     General: Skin is warm and dry.      Coloration: Skin is not pale.      Findings: No erythema or rash.   Neurological:      Mental " Status: She is alert and oriented to person, place, and time.   Psychiatric:         Behavior: Behavior normal.         Thought Content: Thought content normal.         Judgment: Judgment normal.          Assessment & Plan   Diagnoses and all orders for this visit:    1. Other dysphagia (Primary)  -     Case Request; Standing  -     dextrose 5 % and sodium chloride 0.45 % infusion  -     Case Request    Other orders  -     Follow Anesthesia Guidelines / Protocol; Future  -     Obtain Informed Consent; Future  -     Implement Anesthesia Orders Day of Procedure; Standing  -     Obtain Informed Consent; Standing  -     POC Glucose Once; Standing  -     Insert Peripheral IV; Standing        ESOPHAGOGASTRODUODENOSCOPY (N/A)     Diagnosis Plan   1. Other dysphagia  Case Request    dextrose 5 % and sodium chloride 0.45 % infusion    Case Request          Anticipated Surgical Procedure:  Orders Placed This Encounter   Procedures   • Obtain Informed Consent     Standing Status:   Future     Order Specific Question:   Informed Consent Given For     Answer:   ESOPHAGOGASTRODUODENOSCOPY       The risks, benefits, and alternatives of this procedure have been discussed with the patient or the responsible party- the patient understands and agrees to proceed.

## 2023-04-13 NOTE — PROGRESS NOTES
"[facility]      Chief Complaint:   Chief Complaint   Patient presents with   • Difficulty Swallowing   • Heartburn     6 Month follow up       Subjective     HPI:   Patient with dysphagia.  Patient has had esophageal strictures in the past and dilatation markedly improves them.  Patient is on proton pump inhibitor and an H2 blocker.    Plan; we will schedule patient for EGD with dilatation    Past Medical History:   Past Medical History:   Diagnosis Date   • Anesthesia complication     states she \"stopped breathing during her last procedure\"   • Arthritis    • Asthma    • Elevated cholesterol    • Endometriosis    • Falls    • GERD (gastroesophageal reflux disease)    • Osteoporosis    • Pulmonary fibrosis    • Scoliosis of thoracolumbar spine    • Seasonal rhinitis    • Sinusitis        Past Surgical History:  Past Surgical History:   Procedure Laterality Date   • APPENDECTOMY     • COLONOSCOPY  07/05/2022    Per Dr. Evan Chavez M.D., [facility], [place], [place].   • COLONOSCOPY N/A 06/30/2020    Procedure: COLONOSCOPY;  Surgeon: Evan Chavez MD;  Location: [facility] ENDOSCOPY;  Service: Gastroenterology;  Laterality: N/A;   • ENDOSCOPY N/A 10/16/2019    Procedure: ESOPHAGOGASTRODUODENOSCOPY;  Surgeon: Evan Chavez MD;  Location: [facility] ENDOSCOPY;  Service: Gastroenterology   • ENDOSCOPY N/A 06/30/2020    Procedure: ESOPHAGOGASTRODUODENOSCOPY;  Surgeon: Evan Chavez MD;  Location: [facility] ENDOSCOPY;  Service: Gastroenterology;  Laterality: N/A;  savory dilation 48-54   • ENDOSCOPY N/A 11/17/2021    Procedure: ESOPHAGOGASTRODUODENOSCOPY;  Surgeon: Evan Chavez MD;  Location: [facility] ENDOSCOPY;  Service: Gastroenterology;  Laterality: N/A;  48-54 f eso dil. blue   • ENDOSCOPY N/A 7/5/2022    Procedure: ESOPHAGOGASTRODUODENOSCOPY;  Surgeon: Evan Chavez MD;  Location: [facility] ENDOSCOPY;  Service: Gastroenterology;  Laterality: N/A;   • " FOREARM SURGERY     • HYSTERECTOMY  1984    total   • SALPINGO OOPHORECTOMY     • UPPER GASTROINTESTINAL ENDOSCOPY  10/16/2019   • UPPER GASTROINTESTINAL ENDOSCOPY  06/30/2020   • UPPER GASTROINTESTINAL ENDOSCOPY  11/17/2021   • WRIST FRACTURE SURGERY Right        Family History:  Family History   Problem Relation Age of Onset   • Heart failure Mother    • Thyroid disease Mother    • Ulcerative colitis Father        Social History:   reports that she quit smoking about 21 years ago. Her smoking use included cigarettes. She started smoking about 41 years ago. She has a 50.00 pack-year smoking history. She has never used smokeless tobacco. She reports that she does not currently use alcohol. She reports that she does not use drugs.    Medications:   Prior to Admission medications    Medication Sig Start Date End Date Taking? Authorizing Provider   acetaminophen-codeine (TYLENOL #3) 300-30 MG per tablet Take 1 tablet by mouth 2 (Two) Times a Day As Needed for Moderate Pain . 7/21/22  Yes Neena Kee APRN   albuterol (PROVENTIL) (2.5 MG/3ML) 0.083% nebulizer solution Take 2.5 mg by nebulization 4 (Four) Times a Day As Needed for Wheezing. Mix with atrovent 3/28/23  Yes Charlene Sheffield APRN   albuterol sulfate HFA (Ventolin HFA) 108 (90 Base) MCG/ACT inhaler Inhale 2 puffs Every 4 (Four) Hours As Needed for Wheezing or Shortness of Air. 12/15/22  Yes Mariah De Jesus DO   benzonatate (Tessalon Perles) 100 MG capsule Take 1 capsule by mouth 3 (Three) Times a Day As Needed for Cough. 3/10/23  Yes Mariah De Jesus DO   Biotin 1 MG capsule Take 1 capsule by mouth Daily.   Yes ProviderRoosevelt MD   Budeson-Glycopyrrol-Formoterol (Breztri Aerosphere) 160-9-4.8 MCG/ACT aerosol inhaler Inhale 2 puffs 2 (Two) Times a Day. 12/15/22  Yes Mariah De Jesus DO   Calcium-Magnesium-Vitamin D - MG-MG-UNIT tablet sustained-release 24 hour Take 2 tablets by mouth Daily.   Yes ProviderRoosevelt MD   Cod Liver  Oil capsule Take  by mouth.   Yes Roosevelt Bryant MD   Cranberry 500 MG tablet Take 500 mg by mouth 2 (two) times a day.   Yes Roosevelt Bryant MD   dicyclomine (BENTYL) 20 MG tablet TAKE ONE TABLET BY MOUTH EVERY 6 HOURS 11/14/22  Yes Evan Rondon MD   famotidine (PEPCID) 20 MG tablet TAKE 1 TABLET BY MOUTH DAILY. 12/13/22  Yes Evan Rondon MD   fexofenadine (Allegra Allergy) 180 MG tablet Take 1 tablet by mouth Daily. 12/29/22  Yes Mariah De Jesus DO   fluconazole (DIFLUCAN) 150 MG tablet Take 1 tablet by mouth Take As Directed. Take on tab PO at onset of sx; may repeat dose in 72 hours if needed 9/19/22  Yes Neena Kee APRN   fluticasone (CUTIVATE) 0.005 % ointment  8/31/22  Yes Roosevelt Bryant MD   fluticasone (FLONASE) 50 MCG/ACT nasal spray 2 sprays into each nostril daily 7/1/21  Yes Mariah De Jesus DO   ipratropium (ATROVENT) 0.02 % nebulizer solution Take 2.5 mL by nebulization 4 (Four) Times a Day As Needed for Wheezing or Shortness of Air. Mix with albuterol 3/28/23  Yes Charlene Sheffield APRN   ipratropium-albuterol (DUO-NEB) 0.5-2.5 mg/3 ml nebulizer Take 3 mL by nebulization Every 4 (Four) Hours As Needed for Wheezing or Shortness of Air. 12/15/22  Yes Mariah De Jesus DO   montelukast (SINGULAIR) 10 MG tablet Take 1 tablet by mouth Daily. 3/1/22  Yes Roosevelt Bryant MD   olopatadine (PATANASE) 0.6 % solution nasal solution 2 sprays by Each Nare route 2 (Two) Times a Day. 7/1/21  Yes Mariah De Jesus DO   pantoprazole (PROTONIX) 40 MG EC tablet Take 1 tablet by mouth Daily. 10/13/22  Yes Evan Rondon MD   predniSONE (DELTASONE) 10 MG tablet Take 4 tabs daily x 3 days, then take 3 tabs daily x 3 days, then take 2 tabs daily x 3 days, then take 1 tab daily x 3 days 4/5/23  Yes Charlene Sheffield APRN   vitamin D3 125 MCG (5000 UT) capsule capsule Take 1 capsule by mouth Daily. Takes 2 daily   Yes Provider, Historical, MD       Allergies:  Sulfa antibiotics,  "Levaquin [levofloxacin], Augmentin [amoxicillin-pot clavulanate], and Cefuroxime    ROS:    Review of Systems   Constitutional: Negative for activity change, appetite change, chills, diaphoresis, fatigue, fever and unexpected weight change.   HENT: Positive for trouble swallowing. Negative for sore throat.    Respiratory: Negative for shortness of breath.    Gastrointestinal: Negative for abdominal distention, abdominal pain, anal bleeding, blood in stool, constipation, diarrhea, nausea, rectal pain and vomiting.   Endocrine: Negative for polydipsia, polyphagia and polyuria.   Genitourinary: Negative for difficulty urinating.   Musculoskeletal: Negative for arthralgias.   Skin: Negative for pallor.   Allergic/Immunologic: Negative for food allergies.   Neurological: Negative for weakness and light-headedness.   Psychiatric/Behavioral: Negative for behavioral problems.     Objective     Blood pressure 120/75, pulse 82, height 166.4 cm (65.5\"), weight 80.8 kg (178 lb 3.2 oz).    Physical Exam  Constitutional:       General: She is not in acute distress.     Appearance: She is well-developed. She is not diaphoretic.   HENT:      Head: Normocephalic and atraumatic.   Cardiovascular:      Rate and Rhythm: Normal rate and regular rhythm.      Heart sounds: Normal heart sounds. No murmur heard.    No friction rub. No gallop.   Pulmonary:      Effort: No respiratory distress.      Breath sounds: Normal breath sounds. No wheezing or rales.   Chest:      Chest wall: No tenderness.   Abdominal:      General: Bowel sounds are normal. There is no distension.      Palpations: Abdomen is soft. There is no mass.      Tenderness: There is no abdominal tenderness. There is no guarding or rebound.      Hernia: No hernia is present.   Musculoskeletal:         General: Normal range of motion.   Skin:     General: Skin is warm and dry.      Coloration: Skin is not pale.      Findings: No erythema or rash.   Neurological:      Mental " Status: She is alert and oriented to person, place, and time.   Psychiatric:         Behavior: Behavior normal.         Thought Content: Thought content normal.         Judgment: Judgment normal.          Assessment & Plan   Diagnoses and all orders for this visit:    1. Other dysphagia (Primary)  -     Case Request; Standing  -     dextrose 5 % and sodium chloride 0.45 % infusion  -     Case Request    Other orders  -     Follow Anesthesia Guidelines / Protocol; Future  -     Obtain Informed Consent; Future  -     Implement Anesthesia Orders Day of Procedure; Standing  -     Obtain Informed Consent; Standing  -     POC Glucose Once; Standing  -     Insert Peripheral IV; Standing        ESOPHAGOGASTRODUODENOSCOPY (N/A)     Diagnosis Plan   1. Other dysphagia  Case Request    dextrose 5 % and sodium chloride 0.45 % infusion    Case Request          Anticipated Surgical Procedure:  Orders Placed This Encounter   Procedures   • Obtain Informed Consent     Standing Status:   Future     Order Specific Question:   Informed Consent Given For     Answer:   ESOPHAGOGASTRODUODENOSCOPY       The risks, benefits, and alternatives of this procedure have been discussed with the patient or the responsible party- the patient understands and agrees to proceed.

## 2023-04-14 NOTE — TELEPHONE ENCOUNTER
Called pt to let her know that Dr. Evans want be back until the beginning of the new year but will put in for him to prescribe her the Breo     [facility]  General Surgery  Progress Note    Subjective:     Interval History:  Postop day 1 from laparoscopic cholecystectomy for severe acute on chronic cholecystitis.  Patient having CLYDE drain placed at the time of surgery for severity disease purulence encountered in monitoring for bile.  Overnight patient having complaints of abdominal pain discomfort which have subsequently improved over the last 24 hours.  Patient states that this time she feels much better.  Denies nausea vomiting or fevers.  She has been able to tolerate liquids at this time and will be advanced on her diet.    Post-Op Info:  Procedure(s) (LRB):  CHOLECYSTECTOMY, LAPAROSCOPIC (N/A)   1 Day Post-Op      Medications:  Continuous Infusions:   0.9% NACL & POTASSIUM CHLORIDE 40 MEQ/L 75 mL/hr at 04/14/23 0824     Scheduled Meds:   ciprofloxacin HCl  500 mg Oral Q12H    enoxaparin  40 mg Subcutaneous Daily    metoprolol succinate  50 mg Oral QHS     PRN Meds:acetaminophen, ceFOXItin (MEFOXIN) IVPB, cloNIDine, hydrALAZINE, HYDROmorphone, iopamidoL, melatonin, ondansetron, sodium chloride 0.9%     Objective:     Vital Signs (Most Recent):  Temp: 97.8 °F (36.6 °C) (04/14/23 0701)  Pulse: 110 (04/14/23 1050)  Resp: 20 (04/14/23 1357)  BP: (!) 150/100 (04/14/23 1050)  SpO2: 100 % (04/14/23 0855)   Vital Signs (24h Range):  Temp:  [97.5 °F (36.4 °C)-99.4 °F (37.4 °C)] 97.8 °F (36.6 °C)  Pulse:  [] 110  Resp:  [18-20] 20  SpO2:  [94 %-100 %] 100 %  BP: (129-178)/() 150/100       Intake/Output Summary (Last 24 hours) at 4/14/2023 1455  Last data filed at 4/14/2023 0612  Gross per 24 hour   Intake 2198 ml   Output 925 ml   Net 1273 ml       Physical Exam  Abdominal:      General: Abdomen is flat. Bowel sounds are normal. There is no distension.      Palpations: Abdomen is soft. There is no mass.      Tenderness: There is abdominal tenderness. There is no right CVA tenderness, left CVA tenderness, guarding or rebound.       Hernia: No hernia is present.      Comments: Appropriate tenderness to palpation, CLYDE drain in the right quadrant with thin serosanguineous output no bilious or enteric leakage       Significant Labs:  BMP:   Recent Labs   Lab 04/13/23 0416 04/13/23 2018 04/14/23 0454      < > 135   K 3.1*   < > 3.8   CO2 30   < > 28   BUN 2.0*   < > 3.0*   CREATININE 0.57*   < > 0.59*   CALCIUM 8.6   < > 8.1*   MG 2.20  --   --     < > = values in this interval not displayed.     CBC:   Recent Labs   Lab 04/14/23 0454   WBC 14.9*   RBC 4.51   HGB 11.7*   HCT 35.7*   *   MCV 79.2   MCH 25.9*   MCHC 32.8     CMP:   Recent Labs   Lab 04/13/23 2018 04/14/23 0454   CALCIUM 8.7 8.1*   ALBUMIN 3.4  --     135   K 3.6 3.8   CO2 25 28   BUN 2.0* 3.0*   CREATININE 0.56* 0.59*   ALKPHOS 123  --    ALT 64*  --    AST 65*  --    BILITOT 0.8  --        Significant Diagnostics:  None    Assessment/Plan:     Active Diagnoses:    Diagnosis Date Noted POA    PRINCIPAL PROBLEM:  Acute cholecystitis [K81.0] 04/13/2023 Yes    HTN (hypertension) [I10] 04/14/2023 Yes    Hypokalemia [E87.6] 04/14/2023 Yes    UTI (urinary tract infection) [N39.0] 04/14/2023 Yes      Problems Resolved During this Admission:     Continue with empiric antibiotics was given  Advanced to regular low-fat diet as tolerated  Education been provided the patient with regards to drain management and specific instructions for monitoring of color changes or signs of infection  Upon appropriate pain control patient should be stable for discharge to home from a surgical standpoint and can follow up in surgical clinic in 1 week    Екатерина Krishna MD  General Surgery  [facility]/Surg

## 2023-04-19 RX ORDER — FAMOTIDINE 20 MG/1
20 TABLET, FILM COATED ORAL DAILY
Qty: 30 TABLET | Refills: 3 | Status: SHIPPED | OUTPATIENT
Start: 2023-04-19

## 2023-04-20 ENCOUNTER — ANESTHESIA (OUTPATIENT)
Dept: GASTROENTEROLOGY | Facility: HOSPITAL | Age: 68
End: 2023-04-20
Payer: MEDICARE

## 2023-04-20 ENCOUNTER — HOSPITAL ENCOUNTER (OUTPATIENT)
Facility: HOSPITAL | Age: 68
Setting detail: HOSPITAL OUTPATIENT SURGERY
Discharge: HOME | End: 2023-04-20
Attending: INTERNAL MEDICINE | Admitting: INTERNAL MEDICINE
Payer: MEDICARE

## 2023-04-20 ENCOUNTER — ANESTHESIA EVENT (OUTPATIENT)
Dept: GASTROENTEROLOGY | Facility: HOSPITAL | Age: 68
End: 2023-04-20
Payer: MEDICARE

## 2023-04-20 VITALS
TEMPERATURE: 97.2 F | RESPIRATION RATE: 16 BRPM | HEART RATE: 85 BPM | HEIGHT: 65 IN | OXYGEN SATURATION: 95 % | SYSTOLIC BLOOD PRESSURE: 122 MMHG | BODY MASS INDEX: 29.16 KG/M2 | WEIGHT: 175 LBS | DIASTOLIC BLOOD PRESSURE: 74 MMHG

## 2023-04-20 DIAGNOSIS — R13.19 OTHER DYSPHAGIA: ICD-10-CM

## 2023-04-20 PROCEDURE — 25010000002 PROPOFOL 10 MG/ML EMULSION

## 2023-04-20 PROCEDURE — 43248 EGD GUIDE WIRE INSERTION: CPT | Performed by: INTERNAL MEDICINE

## 2023-04-20 PROCEDURE — C1769 GUIDE WIRE: HCPCS | Performed by: INTERNAL MEDICINE

## 2023-04-20 RX ORDER — PROPOFOL 10 MG/ML
VIAL (ML) INTRAVENOUS AS NEEDED
Status: DISCONTINUED | OUTPATIENT
Start: 2023-04-20 | End: 2023-04-20 | Stop reason: SURG

## 2023-04-20 RX ORDER — DEXTROSE AND SODIUM CHLORIDE 5; .45 G/100ML; G/100ML
30 INJECTION, SOLUTION INTRAVENOUS CONTINUOUS PRN
Status: DISCONTINUED | OUTPATIENT
Start: 2023-04-20 | End: 2023-04-20 | Stop reason: HOSPADM

## 2023-04-20 RX ORDER — LIDOCAINE HYDROCHLORIDE 20 MG/ML
INJECTION, SOLUTION INTRAVENOUS AS NEEDED
Status: DISCONTINUED | OUTPATIENT
Start: 2023-04-20 | End: 2023-04-20 | Stop reason: SURG

## 2023-04-20 RX ADMIN — PROPOFOL 20 MG: 10 INJECTION, EMULSION INTRAVENOUS at 14:47

## 2023-04-20 RX ADMIN — LIDOCAINE HYDROCHLORIDE 50 MG: 20 INJECTION, SOLUTION INTRAVENOUS at 14:46

## 2023-04-20 RX ADMIN — PROPOFOL 60 MG: 10 INJECTION, EMULSION INTRAVENOUS at 14:46

## 2023-04-20 RX ADMIN — PROPOFOL 20 MG: 10 INJECTION, EMULSION INTRAVENOUS at 14:49

## 2023-04-20 RX ADMIN — DEXTROSE AND SODIUM CHLORIDE 30 ML/HR: 5; 450 INJECTION, SOLUTION INTRAVENOUS at 14:21

## 2023-04-20 NOTE — ANESTHESIA POSTPROCEDURE EVALUATION
Patient: Genna Garcia    Procedure Summary     Date: 04/20/23 Room / Location: [facility] ENDOSCOPY 1 / [facility] ENDOSCOPY    Anesthesia Start: 1445 Anesthesia Stop: 1454    Procedure: ESOPHAGOGASTRODUODENOSCOPY Diagnosis:       Other dysphagia      (Other dysphagia [R13.19])    Surgeons: Evan Rondon MD Provider: Nabila Rondon CRNA    Anesthesia Type: general ASA Status: 3          Anesthesia Type: general    Vitals  No vitals data found for the desired time range.          Post Anesthesia Care and Evaluation    Patient location during evaluation: bedside  Patient participation: waiting for patient participation  Level of consciousness: responsive to verbal stimuli  Pain management: adequate    Airway patency: patent  Anesthetic complications: No anesthetic complications  PONV Status: none  Cardiovascular status: acceptable  Respiratory status: acceptable  Hydration status: acceptable    Comments: -------------------------              04/20/23                    1356        -------------------------   BP:         166/77        Pulse:        98          Resp:         18          Temp:   97 °F (36.1 °C)   SpO2:         92%        -------------------------

## 2023-04-20 NOTE — ANESTHESIA PREPROCEDURE EVALUATION
Anesthesia Evaluation     Patient summary reviewed and Nursing notes reviewed   NPO Solid Status: > 8 hours  NPO Liquid Status: > 8 hours           Airway   Mallampati: III  TM distance: >3 FB  Neck ROM: full  No difficulty expected  Dental    (+) poor dentition    Pulmonary - normal exam   (+) a smoker Former, COPD, asthma,  Cardiovascular - normal exam    (+) hyperlipidemia,       Neuro/Psych  GI/Hepatic/Renal/Endo    (+)  GERD,      ROS Comment: Irritable bowel syndrome    Musculoskeletal     (+) back pain, chronic pain,   Abdominal    Substance History      OB/GYN          Other   arthritis,                        Anesthesia Plan    ASA 3     general   total IV anesthesia  intravenous induction     Anesthetic plan, risks, benefits, and alternatives have been provided, discussed and informed consent has been obtained with: patient.    Plan discussed with CRNA.       Regarding: Medication   ----- Message from Layne Richter sent at 11/17/2017  6:02 PM CST -----  Patient Name: Ashlee Lopes  Specialist or PCP:Rick Aguilar   Pregnant (If Yes, how long?):N/A  Symptoms: Needs verbal authorization   Call Back #:[phone]  Is the patient’s permanent residence located in [place], [place], or a [facility]? Yes [facility] [id number]  Call Center Account #:[id number]

## 2023-04-27 ENCOUNTER — OFFICE VISIT (OUTPATIENT)
Dept: GASTROENTEROLOGY | Facility: CLINIC | Age: 68
End: 2023-04-27
Payer: MEDICARE

## 2023-04-27 VITALS
BODY MASS INDEX: 29.12 KG/M2 | WEIGHT: 174.8 LBS | SYSTOLIC BLOOD PRESSURE: 122 MMHG | DIASTOLIC BLOOD PRESSURE: 84 MMHG | HEART RATE: 81 BPM | HEIGHT: 65 IN

## 2023-04-27 DIAGNOSIS — R13.19 OTHER DYSPHAGIA: Primary | ICD-10-CM

## 2023-04-27 RX ORDER — FAMOTIDINE 20 MG/1
20 TABLET, FILM COATED ORAL DAILY
Qty: 30 TABLET | Refills: 3 | Status: SHIPPED | OUTPATIENT
Start: 2023-04-27

## 2023-04-27 RX ORDER — PANTOPRAZOLE SODIUM 40 MG/1
40 TABLET, DELAYED RELEASE ORAL DAILY
Qty: 30 TABLET | Refills: 5 | Status: SHIPPED | OUTPATIENT
Start: 2023-04-27

## 2023-04-27 RX ORDER — DICYCLOMINE HCL 20 MG
20 TABLET ORAL EVERY 6 HOURS
Qty: 120 TABLET | Refills: 3 | Status: SHIPPED | OUTPATIENT
Start: 2023-04-27

## 2023-04-27 NOTE — PROGRESS NOTES
"[facility]      Chief Complaint:   Chief Complaint   Patient presents with   • Difficulty Swallowing     Endo Follow Up       Subjective     HPI:   Patient for Endo follow-up after EGD with dilatation.  Patient states dysphagia is markedly improved and she is no longer having abdominal discomfort.  Patient is currently on any proton pump inhibitor and H2 blocker.  Patient also takes Bentyl for abdominal pain at times.    Plan; we will have patient follow-up in 6 months discussed with patient that she may need to follow-up earlier if dysphagia worsens earlier than this    Past Medical History:   Past Medical History:   Diagnosis Date   • Anesthesia complication     states she \"stopped breathing during her last procedure\"   • Arthritis    • Asthma    • Elevated cholesterol    • Endometriosis    • Falls    • GERD (gastroesophageal reflux disease)    • Osteoporosis    • Pulmonary fibrosis    • Scoliosis of thoracolumbar spine    • Seasonal rhinitis    • Sinusitis        Past Surgical History:    Past Surgical History:   Procedure Laterality Date   • APPENDECTOMY     • COLONOSCOPY  07/05/2022    Per Dr. Evan Chavez M.D., [facility], [place], [place].   • COLONOSCOPY N/A 06/30/2020    Procedure: COLONOSCOPY;  Surgeon: Evan Chavez MD;  Location: [facility] ENDOSCOPY;  Service: Gastroenterology;  Laterality: N/A;   • ENDOSCOPY N/A 10/16/2019    Procedure: ESOPHAGOGASTRODUODENOSCOPY;  Surgeon: Evan Chavez MD;  Location: [facility] ENDOSCOPY;  Service: Gastroenterology   • ENDOSCOPY N/A 06/30/2020    Procedure: ESOPHAGOGASTRODUODENOSCOPY;  Surgeon: Evan Chavez MD;  Location: [facility] ENDOSCOPY;  Service: Gastroenterology;  Laterality: N/A;  savory dilation 48-54   • ENDOSCOPY N/A 11/17/2021    Procedure: ESOPHAGOGASTRODUODENOSCOPY;  Surgeon: Evan Chavez MD;  Location: [facility] ENDOSCOPY;  Service: Gastroenterology;  Laterality: N/A;  48-54 f eso dil. blue "   • ENDOSCOPY N/A 07/05/2022    Procedure: ESOPHAGOGASTRODUODENOSCOPY;  Surgeon: Evan Rondon MD;  Location: [facility] ENDOSCOPY;  Service: Gastroenterology;  Laterality: N/A;   • ENDOSCOPY N/A 4/20/2023    Procedure: ESOPHAGOGASTRODUODENOSCOPY;  Surgeon: Evan Rondon MD;  Location: [facility] ENDOSCOPY;  Service: Gastroenterology;  Laterality: N/A;   • FOREARM SURGERY     • HYSTERECTOMY  1984    total   • SALPINGO OOPHORECTOMY     • UPPER GASTROINTESTINAL ENDOSCOPY  10/16/2019   • UPPER GASTROINTESTINAL ENDOSCOPY  06/30/2020   • UPPER GASTROINTESTINAL ENDOSCOPY  11/17/2021   • WRIST FRACTURE SURGERY Right        Family History:  Family History   Problem Relation Age of Onset   • Heart failure Mother    • Thyroid disease Mother    • Ulcerative colitis Father        Social History:   reports that she quit smoking about 21 years ago. Her smoking use included cigarettes. She started smoking about 41 years ago. She has a 50.00 pack-year smoking history. She has never used smokeless tobacco. She reports that she does not currently use alcohol. She reports that she does not use drugs.    Medications:   Prior to Admission medications    Medication Sig Start Date End Date Taking? Authorizing Provider   acetaminophen-codeine (TYLENOL #3) 300-30 MG per tablet Take 1 tablet by mouth 2 (Two) Times a Day As Needed for Moderate Pain . 7/21/22  Yes Neena Kee APRN   albuterol (PROVENTIL) (2.5 MG/3ML) 0.083% nebulizer solution Take 2.5 mg by nebulization 4 (Four) Times a Day As Needed for Wheezing. Mix with atrovent 3/28/23  Yes Charlene Sheffield APRN   albuterol sulfate HFA (Ventolin HFA) 108 (90 Base) MCG/ACT inhaler Inhale 2 puffs Every 4 (Four) Hours As Needed for Wheezing or Shortness of Air. 12/15/22  Yes Mariah De Jesus DO   benzonatate (Tessalon Perles) 100 MG capsule Take 1 capsule by mouth 3 (Three) Times a Day As Needed for Cough. 3/10/23  Yes Mariah De Jesus,    Biotin 1 MG capsule Take 1 capsule by mouth  Daily.   Yes Roosevelt Bryant MD   Budeson-Glycopyrrol-Formoterol (Breztri Aerosphere) 160-9-4.8 MCG/ACT aerosol inhaler Inhale 2 puffs 2 (Two) Times a Day. 12/15/22  Yes Mariah De Jesus, DO   Calcium-Magnesium-Vitamin D - MG-MG-UNIT tablet sustained-release 24 hour Take 2 tablets by mouth Daily.   Yes Roosevelt Bryant MD   Cod Liver Oil capsule Take  by mouth.   Yes Roosevelt Bryant MD   Cranberry 500 MG tablet Take 500 mg by mouth 2 (two) times a day.   Yes Roosevelt Bryant MD   dicyclomine (BENTYL) 20 MG tablet Take 1 tablet by mouth Every 6 (Six) Hours. 4/27/23  Yes Evan Rondon MD   famotidine (PEPCID) 20 MG tablet Take 1 tablet by mouth Daily. 4/27/23  Yes Evan Rondon MD   fexofenadine (Allegra Allergy) 180 MG tablet Take 1 tablet by mouth Daily. 12/29/22  Yes Mariah De Jesus DO   fluticasone (FLONASE) 50 MCG/ACT nasal spray 2 sprays into each nostril daily 7/1/21  Yes Mariah De Jesus, DO   ipratropium (ATROVENT) 0.02 % nebulizer solution Take 2.5 mL by nebulization 4 (Four) Times a Day As Needed for Wheezing or Shortness of Air. Mix with albuterol 3/28/23  Yes Charlene Sheffield APRN   ipratropium-albuterol (DUO-NEB) 0.5-2.5 mg/3 ml nebulizer Take 3 mL by nebulization Every 4 (Four) Hours As Needed for Wheezing or Shortness of Air. 12/15/22  Yes Mariah De Jesus DO   montelukast (SINGULAIR) 10 MG tablet Take 1 tablet by mouth Daily. 3/1/22  Yes Roosevelt Bryant MD   olopatadine (PATANASE) 0.6 % solution nasal solution 2 sprays by Each Nare route 2 (Two) Times a Day. 7/1/21  Yes Mariah De Jesus DO   pantoprazole (PROTONIX) 40 MG EC tablet Take 1 tablet by mouth Daily. 4/27/23  Yes Evan Rondon MD   vitamin D3 125 MCG (5000 UT) capsule capsule Take 1 capsule by mouth Daily. Takes 2 daily   Yes Provider, MD Roosevelt   dicyclomine (BENTYL) 20 MG tablet TAKE ONE TABLET BY MOUTH EVERY 6 HOURS 11/14/22 4/27/23 Yes Evan Rondon MD   famotidine (PEPCID)  "20 MG tablet TAKE 1 TABLET BY MOUTH DAILY. 4/19/23 4/27/23 Yes Evan Rondon MD   pantoprazole (PROTONIX) 40 MG EC tablet Take 1 tablet by mouth Daily. 10/13/22 4/27/23 Yes Evan Rondon MD       Allergies:  Sulfa antibiotics, Levaquin [levofloxacin], Augmentin [amoxicillin-pot clavulanate], and Cefuroxime    ROS:    Review of Systems   Constitutional: Negative for activity change, appetite change, chills, diaphoresis, fatigue, fever and unexpected weight change.   HENT: Negative for sore throat and trouble swallowing.    Respiratory: Negative for shortness of breath.    Gastrointestinal: Negative for abdominal distention, abdominal pain, anal bleeding, blood in stool, constipation, diarrhea, nausea, rectal pain and vomiting.   Endocrine: Negative for polydipsia, polyphagia and polyuria.   Genitourinary: Negative for difficulty urinating.   Musculoskeletal: Negative for arthralgias.   Skin: Negative for pallor.   Allergic/Immunologic: Negative for food allergies.   Neurological: Negative for weakness and light-headedness.   Psychiatric/Behavioral: Negative for behavioral problems.     Objective     Blood pressure 122/84, pulse 81, height 165.1 cm (65\"), weight 79.3 kg (174 lb 12.8 oz).    Physical Exam  Constitutional:       General: She is not in acute distress.     Appearance: She is well-developed. She is not diaphoretic.   HENT:      Head: Normocephalic and atraumatic.   Cardiovascular:      Rate and Rhythm: Normal rate and regular rhythm.      Heart sounds: Normal heart sounds. No murmur heard.    No friction rub. No gallop.   Pulmonary:      Effort: No respiratory distress.      Breath sounds: Normal breath sounds. No wheezing or rales.   Chest:      Chest wall: No tenderness.   Abdominal:      General: Bowel sounds are normal. There is no distension.      Palpations: Abdomen is soft. There is no mass.      Tenderness: There is no abdominal tenderness. There is no guarding or rebound.      Hernia: No " hernia is present.   Musculoskeletal:         General: Normal range of motion.   Skin:     General: Skin is warm and dry.      Coloration: Skin is not pale.      Findings: No erythema or rash.   Neurological:      Mental Status: She is alert and oriented to person, place, and time.   Psychiatric:         Behavior: Behavior normal.         Thought Content: Thought content normal.         Judgment: Judgment normal.          Assessment & Plan   Diagnoses and all orders for this visit:    1. Other dysphagia (Primary)    Other orders  -     dicyclomine (BENTYL) 20 MG tablet; Take 1 tablet by mouth Every 6 (Six) Hours.  Dispense: 120 tablet; Refill: 3  -     famotidine (PEPCID) 20 MG tablet; Take 1 tablet by mouth Daily.  Dispense: 30 tablet; Refill: 3  -     pantoprazole (PROTONIX) 40 MG EC tablet; Take 1 tablet by mouth Daily.  Dispense: 30 tablet; Refill: 5        * Surgery not found *     Diagnosis Plan   1. Other dysphagia            Anticipated Surgical Procedure:  No orders of the defined types were placed in this encounter.      The risks, benefits, and alternatives of this procedure have been discussed with the patient or the responsible party- the patient understands and agrees to proceed.

## 2023-07-25 ENCOUNTER — OFFICE VISIT (OUTPATIENT)
Dept: PULMONOLOGY | Facility: CLINIC | Age: 68
End: 2023-07-25
Payer: MEDICARE

## 2023-07-25 ENCOUNTER — PROCEDURE VISIT (OUTPATIENT)
Dept: PULMONOLOGY | Facility: CLINIC | Age: 68
End: 2023-07-25
Payer: MEDICARE

## 2023-07-25 VITALS
WEIGHT: 176 LBS | HEART RATE: 88 BPM | SYSTOLIC BLOOD PRESSURE: 118 MMHG | OXYGEN SATURATION: 94 % | HEIGHT: 65 IN | DIASTOLIC BLOOD PRESSURE: 80 MMHG | BODY MASS INDEX: 29.32 KG/M2

## 2023-07-25 DIAGNOSIS — Z87.19 HISTORY OF ESOPHAGEAL STRICTURE: ICD-10-CM

## 2023-07-25 DIAGNOSIS — J84.10 PULMONARY FIBROSIS: ICD-10-CM

## 2023-07-25 DIAGNOSIS — J45.40 MODERATE PERSISTENT ASTHMA WITHOUT COMPLICATION: Primary | ICD-10-CM

## 2023-07-25 DIAGNOSIS — R05.3 CHRONIC COUGH: Primary | ICD-10-CM

## 2023-07-25 DIAGNOSIS — R05.3 CHRONIC COUGH: ICD-10-CM

## 2023-07-25 DIAGNOSIS — J30.9 CHRONIC ALLERGIC RHINITIS: ICD-10-CM

## 2023-07-25 PROCEDURE — 1160F RVW MEDS BY RX/DR IN RCRD: CPT | Performed by: INTERNAL MEDICINE

## 2023-07-25 PROCEDURE — 99214 OFFICE O/P EST MOD 30 MIN: CPT | Performed by: INTERNAL MEDICINE

## 2023-07-25 PROCEDURE — 94727 GAS DIL/WSHOT DETER LNG VOL: CPT | Performed by: INTERNAL MEDICINE

## 2023-07-25 PROCEDURE — 94060 EVALUATION OF WHEEZING: CPT | Performed by: INTERNAL MEDICINE

## 2023-07-25 PROCEDURE — 94729 DIFFUSING CAPACITY: CPT | Performed by: INTERNAL MEDICINE

## 2023-07-25 PROCEDURE — 1159F MED LIST DOCD IN RCRD: CPT | Performed by: INTERNAL MEDICINE

## 2023-07-25 NOTE — PROGRESS NOTES
FULL PFT WITH BRONCHODILATOR PERFORMED.     GOOD PATIENT EFFORT AND COOPERATION.     4-6 SECOND EXHALATION ON SPIROMETRY. SEVERAL COUGHING EPISODES DURING INHALATION AND EXHALATION.     ORDERED BY DR. PIZARRO, READ BY DR. PIZARRO

## 2023-07-25 NOTE — PROGRESS NOTES
Pulmonary Office Follow-up    Subjective     Genna Garcia is seen today at the office for   Chief Complaint   Patient presents with    Cough       HPI  Genna Garcia is a 68 y.o. female with a PMH significant for bronciectasis.     23  Patient here for short interval follow up. She had PFTs today, not drmaamtically changed from last year, but still with +BDR. CT scan done and read as worsening fibrosis, concerning for UIP  She says that she woke up last thursday and felt significantly better. She had no pain and was back to her normal, manageable cough      23  Patient tells me that since her last appointment, she's been having more problems with the coughing and fatigue. These episodes last about 4-6 weeks, and then seem to get better. There's no clear thing that makes her feel better.  This is different than her chronic cough  Her last EGD and dilation was in April  She's been told by her PCM that she's had RSV???? But doesn't sound like she's been actually tested  She was seeing Dr Vinson (Allergy/Immunology in [place]), but he recently   She was referred to Dr shah in [facility] but she never saw him, only saw the NP, and they never addressed her sinus issues      22  Patient here for routine follow up. Since last visit, she got her CPAP machine and is getting used to that. She is using a nasal mask  Shes been using her nebs twice a day. She's been using Breztri and that is still working ok for her  Biggest concern today is lack of energy in the afternoons    10/28/22  Patient here for acute visit. She called yesterday with complaint of pleuritic pain on the right. She finished a course of prednisone recently. She got a CXR today that appeared at her baseline  She reports that for about the last week she feels a flutter in the back of her chest      10/4/22  Patient here for follow up. Since last visit, she had a swallow study that did not show evidence of  aspiration. She also had a home sleep study that showed an AHI of 15. She had her CPAP titration study and needed a CPAP of 8. She's fairly claustrophobic so had a hard time with the mask    Patient tells me she got sick on Sept 4th. She had chills, maybe a fever, body aches, back pain, headaches. She had a mild cough. She started wheezing. She was put on doxycycline x 2, not given any steroids. Continues ot have wheezing and still short of breath and fatigued.    7/29/22  Patinet here to follow up on multiple tests. Had CT chest that showed unchanged chronic fibrotic changes in the bases. PFTs today. Swallow study hasn't been done yet (scheduled for the 10th)  She has been doing much better on the Breztri than the Breo, fels like breathing is much better. She also does a breathing treatment before meals and that helps her to cough less as well      7/11/22  Patient here to follow up after EGD for esophageal dilation. She feels like she is swallowing better and having less chest pressure. Still coughing with eating though. She coughs other times too, but has more of a choking cough with eating. Does feel like the steroids helped as well.      6/14/22  Patient here for routine follow up. She reports that for the last few months, more chest proessure and trouble breahting. Neb treatments do help, she brings up more mucus with those. She is using Breo at night before bed.  She has been having more snoring lately and concerns about apneas  She has seen Dr Rondon, has an EGD scheduled for July 5th with plans for a dilation  She saw allergy and was started on Singulair. She was told she couldn't get skin testing done because she would have to stop all of her antihistamines for two weeks and she doesn't feel she can stop them for that long    12/16/21  Patient here for follow up. She is doing pretty well from a breathing standpoint. Has episodes of getting more winded. She notices it mostly when the weather is bad. She's  still doing her Breo daily in the afternoon which helps. Her asthma seems well controlled. She does feel like her allergies are worse though. She used to get allergy shots and had to stop when she broke her arm. She would like to see a new allergist about getting back on IT schedule  She saw GI and had an esophageal dilation again since last visit. She is eating better. However she still has coughing with eating    9/24/21  Patient here for follow up. At last visit I ordered her Symbicort but her insurance wouldn't cover it. She apparently got generic low dose Symbicort though, and this gave her severe headaches. Went back to Breo but taking it later in the day and that worked better  She continues to have coughing when she eats. She didn't discuss this with her GI doc at last appointment but she is going to see him again    Last OV 7/1/21  Last seen by Dr Evans in Feb  Patient with multiple overlapping issues going on. Essentially she has chronic productive cough from several sources. She has reflux and symptoms of aspiration (coughing with/after eating), and the lower lobe fibrosis on her Ct scan supports this. I can't find a swallow study anywhere. She has esophageal strictures that she has to have stretched periodically   She has allergies, previously on allergy shots but can't afford them now. She has seen ENT before, was going to have sinus surgery but held off for other medical reasons and now her ENT doctor has left. She has run out of her nasal sprays and Allegra.  She is on Breo and that helps for most of the morning but by late afternoon she is having wheezing and coughing again. She uses Duonebs and Combivent as needed    Tobacco use history:  Type: cigarettes  Amount: 2-3 ppd  Duration: 18 years  Cessation: 2002   Willing to quit: N/A      Patient Active Problem List   Diagnosis    Scoliosis of thoracolumbar spine    Other dysphagia    Pain in joint of right shoulder    Change in bowel habits     Chronic cough    Chronic allergic rhinitis    Severe allergic reaction with respiratory distress    Senile osteoporosis    Gastroesophageal reflux disease without esophagitis    History of esophageal stricture    History of esophageal dilatation    History of tobacco abuse    Postmenopausal    Early menopause occurring in patient age younger than 45 years    Irritable bowel syndrome    Long-term use of high-risk medication    Overweight with body mass index (BMI) of 27 to 27.9 in adult    Chronic bilateral low back pain    Age-related osteoporosis without current pathological fracture         Medications, Allergies, Social, and Family Histories reviewed as per EMR.    Objective     Vitals:    07/25/23 1347   BP: 118/80   Pulse: 88   SpO2: 94%         07/25/23  1347   Weight: 79.8 kg (176 lb)     [unfilled]  Physical Exam  Vitals reviewed.   Constitutional:       Appearance: Normal appearance.   HENT:      Head: Normocephalic and atraumatic.      Nose: Nose normal.      Mouth/Throat:      Mouth: Mucous membranes are moist.      Pharynx: Oropharynx is clear.   Eyes:      Conjunctiva/sclera: Conjunctivae normal.      Pupils: Pupils are equal, round, and reactive to light.   Cardiovascular:      Rate and Rhythm: Normal rate and regular rhythm.      Pulses: Normal pulses.      Heart sounds: Normal heart sounds.   Pulmonary:      Effort: Pulmonary effort is normal.      Breath sounds: Normal breath sounds.   Abdominal:      General: Abdomen is flat. Bowel sounds are normal.      Palpations: Abdomen is soft.   Musculoskeletal:         General: Normal range of motion.      Cervical back: Normal range of motion.   Skin:     General: Skin is warm and dry.   Neurological:      General: No focal deficit present.      Mental Status: She is alert and oriented to person, place, and time.   Psychiatric:         Mood and Affect: Mood normal.         Behavior: Behavior normal.             PFTs:  (independently reviewed and  interpreted by me)  10/21/20  FVC 2.22L,  69%  FEV1 1.73L,  69%  Ratio 78%  TLC 2.77L,  53%  DLCO 15.66L,  61%  Variable effort.  Mild restriction. Mildly reduced diffusing capacity. No comparative data available.    7/29/22  FVC 2.33L, 73%  FEV1 1.84L, 74%  Ratio 67%  +BDR  TLC 3.65L, 69%  DLCO 47%  Mild reversible obstruction and mild restriction, significant BDR, decreased lung volumes, moderate diffusion impairment     7/25/23  FVC 2.17L, 70%  FEV1 1.63L, 67%  Ratio 75%  TLC 3.13L, 60%  DLCO 48%  Moderate restrictive pattern with significant BDR, decreased lung volume,       Radiology (independently reviewed and interpreted by me):   CT chest  5/20/2020 - small focus of nodularity posterior right upper lobe, lower lobe predominant interstitial opacity with air bronchograms and traction bronchiectasis, numerous calcified granulomas bilaterally, mildly enlarged mediastinal lymph nodes largest precarinal measuring 1.1 x 1.55 cm, small axillary and retropectoral lymph nodes largest measuring 8.5 mm, findings similar to 4/2/2016. Notable thoracic/lumbar scoliosis    CT chest 7/20/23- slightly worsened fibrotic changes     Swallow study 8/10/22- Minimal Dysphagia- video swallow shows slight deviance from a normal swallow. Patient may report change in sensation during swallow. No change in diet is required.    Assessment & Plan     Diagnoses and all orders for this visit:    1. Moderate persistent asthma without complication (Primary)    2. Chronic cough    3. Chronic allergic rhinitis    4. History of esophageal stricture    5. Pulmonary fibrosis           Discussion/ Recommendations:   Patient with multiple causes of cough, which she knows. Not sure why she's having these periods of exacerbation for the last few months. I'm not entirely sure if her fibrotic changes on CT are actually UIP or from reflux/aspiration. I would like a second opinon on this before I commit her to Ofe[facility]. Patient amenable to this. She would  preddunia Dell          No follow-ups on file.          This document has been electronically signed by Mariah De Jesus DO on July 26, 2023 12:17 CDT

## 2023-07-26 PROBLEM — J44.9 CHRONIC OBSTRUCTIVE PULMONARY DISEASE: Status: RESOLVED | Noted: 2022-03-07 | Resolved: 2023-07-26

## 2023-08-01 DIAGNOSIS — J41.0 SIMPLE CHRONIC BRONCHITIS: ICD-10-CM

## 2023-08-01 RX ORDER — ALBUTEROL SULFATE 2.5 MG/3ML
2.5 SOLUTION RESPIRATORY (INHALATION) 4 TIMES DAILY PRN
Qty: 180 ML | Refills: 3 | Status: SHIPPED | OUTPATIENT
Start: 2023-08-01

## 2023-08-29 RX ORDER — FAMOTIDINE 20 MG/1
20 TABLET, FILM COATED ORAL DAILY
Qty: 30 TABLET | Refills: 3 | Status: SHIPPED | OUTPATIENT
Start: 2023-08-29

## 2023-09-02 ENCOUNTER — APPOINTMENT (OUTPATIENT)
Dept: CARDIOLOGY | Facility: HOSPITAL | Age: 68
End: 2023-09-02
Payer: MEDICARE

## 2023-09-02 ENCOUNTER — APPOINTMENT (OUTPATIENT)
Dept: CT IMAGING | Facility: HOSPITAL | Age: 68
End: 2023-09-02
Payer: MEDICARE

## 2023-09-02 ENCOUNTER — HOSPITAL ENCOUNTER (OUTPATIENT)
Facility: HOSPITAL | Age: 68
Setting detail: OBSERVATION
Discharge: HOME | End: 2023-09-03
Attending: FAMILY MEDICINE | Admitting: INTERNAL MEDICINE
Payer: MEDICARE

## 2023-09-02 ENCOUNTER — APPOINTMENT (OUTPATIENT)
Dept: GENERAL RADIOLOGY | Facility: HOSPITAL | Age: 68
End: 2023-09-02
Payer: MEDICARE

## 2023-09-02 DIAGNOSIS — R07.89 OTHER CHEST PAIN: ICD-10-CM

## 2023-09-02 DIAGNOSIS — R07.2 PRECORDIAL PAIN: Primary | ICD-10-CM

## 2023-09-02 PROBLEM — R07.9 CHEST PAIN: Status: ACTIVE | Noted: 2023-09-02

## 2023-09-02 LAB
ALBUMIN SERPL-MCNC: 3.8 G/DL (ref 3.5–5.2)
ALBUMIN/GLOB SERPL: 0.8 G/DL
ALP SERPL-CCNC: 129 U/L (ref 39–117)
ALT SERPL W P-5'-P-CCNC: 19 U/L (ref 1–33)
ANION GAP SERPL CALCULATED.3IONS-SCNC: 11 MMOL/L (ref 5–15)
ANION GAP SERPL CALCULATED.3IONS-SCNC: 11 MMOL/L (ref 5–15)
AST SERPL-CCNC: 25 U/L (ref 1–32)
B PARAPERT DNA SPEC QL NAA+PROBE: NOT DETECTED
B PERT DNA SPEC QL NAA+PROBE: NOT DETECTED
BASOPHILS # BLD AUTO: 0.05 10*3/MM3 (ref 0–0.2)
BASOPHILS NFR BLD AUTO: 0.3 % (ref 0–1.5)
BH CV ECHO MEAS - ACS: 1.41 CM
BH CV ECHO MEAS - AO MAX PG: 16.1 MMHG
BH CV ECHO MEAS - AO MEAN PG: 9.8 MMHG
BH CV ECHO MEAS - AO ROOT DIAM: 2.9 CM
BH CV ECHO MEAS - AO V2 MAX: 200.9 CM/SEC
BH CV ECHO MEAS - AO V2 VTI: 42.2 CM
BH CV ECHO MEAS - AVA(I,D): 1.99 CM2
BH CV ECHO MEAS - EDV(CUBED): 30.2 ML
BH CV ECHO MEAS - EDV(MOD-SP2): 63.8 ML
BH CV ECHO MEAS - EDV(MOD-SP4): 70.9 ML
BH CV ECHO MEAS - EF(MOD-BP): 61.6 %
BH CV ECHO MEAS - EF(MOD-SP2): 63.6 %
BH CV ECHO MEAS - EF(MOD-SP4): 57.7 %
BH CV ECHO MEAS - ESV(CUBED): 12.8 ML
BH CV ECHO MEAS - ESV(MOD-SP2): 23.2 ML
BH CV ECHO MEAS - ESV(MOD-SP4): 30 ML
BH CV ECHO MEAS - FS: 25 %
BH CV ECHO MEAS - IVS/LVPW: 0.88 CM
BH CV ECHO MEAS - IVSD: 1.16 CM
BH CV ECHO MEAS - LA DIMENSION: 4.9 CM
BH CV ECHO MEAS - LAT PEAK E' VEL: 8.5 CM/SEC
BH CV ECHO MEAS - LV DIASTOLIC VOL/BSA (35-75): 37.8 CM2
BH CV ECHO MEAS - LV MASS(C)D: 120.7 GRAMS
BH CV ECHO MEAS - LV MAX PG: 3.6 MMHG
BH CV ECHO MEAS - LV MEAN PG: 1.74 MMHG
BH CV ECHO MEAS - LV SYSTOLIC VOL/BSA (12-30): 16 CM2
BH CV ECHO MEAS - LV V1 MAX: 94.7 CM/SEC
BH CV ECHO MEAS - LV V1 VTI: 23.8 CM
BH CV ECHO MEAS - LVIDD: 3.1 CM
BH CV ECHO MEAS - LVIDS: 2.34 CM
BH CV ECHO MEAS - LVOT AREA: 3.5 CM2
BH CV ECHO MEAS - LVOT DIAM: 2.12 CM
BH CV ECHO MEAS - LVPWD: 1.31 CM
BH CV ECHO MEAS - MED PEAK E' VEL: 7.9 CM/SEC
BH CV ECHO MEAS - MV A MAX VEL: 114 CM/SEC
BH CV ECHO MEAS - MV DEC SLOPE: 611.8 CM/SEC2
BH CV ECHO MEAS - MV E MAX VEL: 81.8 CM/SEC
BH CV ECHO MEAS - MV E/A: 0.72
BH CV ECHO MEAS - MV MAX PG: 5.7 MMHG
BH CV ECHO MEAS - MV MEAN PG: 3.3 MMHG
BH CV ECHO MEAS - MV P1/2T: 43.1 MSEC
BH CV ECHO MEAS - MV V2 VTI: 22.3 CM
BH CV ECHO MEAS - MVA(P1/2T): 5.1 CM2
BH CV ECHO MEAS - MVA(VTI): 3.8 CM2
BH CV ECHO MEAS - PA V2 MAX: 96.5 CM/SEC
BH CV ECHO MEAS - PI END-D VEL: 116.1 CM/SEC
BH CV ECHO MEAS - RAP SYSTOLE: 3 MMHG
BH CV ECHO MEAS - RVDD: 3.1 CM
BH CV ECHO MEAS - RVSP: 58.3 MMHG
BH CV ECHO MEAS - SI(MOD-SP2): 21.6 ML/M2
BH CV ECHO MEAS - SI(MOD-SP4): 21.8 ML/M2
BH CV ECHO MEAS - SV(LVOT): 83.8 ML
BH CV ECHO MEAS - SV(MOD-SP2): 40.6 ML
BH CV ECHO MEAS - SV(MOD-SP4): 40.9 ML
BH CV ECHO MEAS - TAPSE (>1.6): 1.93 CM
BH CV ECHO MEAS - TR MAX PG: 55.3 MMHG
BH CV ECHO MEAS - TR MAX VEL: 371.9 CM/SEC
BH CV ECHO MEASUREMENTS AVERAGE E/E' RATIO: 9.98
BILIRUB SERPL-MCNC: <0.2 MG/DL (ref 0–1.2)
BUN SERPL-MCNC: 11 MG/DL (ref 8–23)
BUN SERPL-MCNC: 12 MG/DL (ref 8–23)
BUN/CREAT SERPL: 15.3 (ref 7–25)
BUN/CREAT SERPL: 15.4 (ref 7–25)
C PNEUM DNA NPH QL NAA+NON-PROBE: NOT DETECTED
CALCIUM SPEC-SCNC: 9.2 MG/DL (ref 8.6–10.5)
CALCIUM SPEC-SCNC: 9.3 MG/DL (ref 8.6–10.5)
CHLORIDE SERPL-SCNC: 101 MMOL/L (ref 98–107)
CHLORIDE SERPL-SCNC: 98 MMOL/L (ref 98–107)
CHOLEST SERPL-MCNC: 251 MG/DL (ref 0–200)
CK SERPL-CCNC: 29 U/L (ref 20–180)
CO2 SERPL-SCNC: 25 MMOL/L (ref 22–29)
CO2 SERPL-SCNC: 26 MMOL/L (ref 22–29)
CREAT SERPL-MCNC: 0.72 MG/DL (ref 0.57–1)
CREAT SERPL-MCNC: 0.78 MG/DL (ref 0.57–1)
D-DIMER, QUANTITATIVE (MAD,POW, STR): 820 NG/ML (FEU) (ref 0–660)
DEPRECATED RDW RBC AUTO: 39.5 FL (ref 37–54)
DEPRECATED RDW RBC AUTO: 42.5 FL (ref 37–54)
EGFRCR SERPLBLD CKD-EPI 2021: 83.9 ML/MIN/1.73
EGFRCR SERPLBLD CKD-EPI 2021: 92.3 ML/MIN/1.73
EOSINOPHIL # BLD AUTO: 0.14 10*3/MM3 (ref 0–0.4)
EOSINOPHIL NFR BLD AUTO: 0.9 % (ref 0.3–6.2)
ERYTHROCYTE [DISTWIDTH] IN BLOOD BY AUTOMATED COUNT: 13.8 % (ref 12.3–15.4)
ERYTHROCYTE [DISTWIDTH] IN BLOOD BY AUTOMATED COUNT: 14.2 % (ref 12.3–15.4)
FLUAV SUBTYP SPEC NAA+PROBE: NOT DETECTED
FLUBV RNA ISLT QL NAA+PROBE: NOT DETECTED
GEN 5 2HR TROPONIN T REFLEX: 7 NG/L
GLOBULIN UR ELPH-MCNC: 4.5 GM/DL
GLUCOSE SERPL-MCNC: 102 MG/DL (ref 65–99)
GLUCOSE SERPL-MCNC: 112 MG/DL (ref 65–99)
HADV DNA SPEC NAA+PROBE: NOT DETECTED
HBA1C MFR BLD: 6 % (ref 4.8–5.6)
HCOV 229E RNA SPEC QL NAA+PROBE: NOT DETECTED
HCOV HKU1 RNA SPEC QL NAA+PROBE: NOT DETECTED
HCOV NL63 RNA SPEC QL NAA+PROBE: NOT DETECTED
HCOV OC43 RNA SPEC QL NAA+PROBE: NOT DETECTED
HCT VFR BLD AUTO: 34.4 % (ref 34–46.6)
HCT VFR BLD AUTO: 39.3 % (ref 34–46.6)
HDLC SERPL-MCNC: 38 MG/DL (ref 40–60)
HGB BLD-MCNC: 11.5 G/DL (ref 12–15.9)
HGB BLD-MCNC: 12.2 G/DL (ref 12–15.9)
HMPV RNA NPH QL NAA+NON-PROBE: NOT DETECTED
HOLD SPECIMEN: NORMAL
HOLD SPECIMEN: NORMAL
HPIV1 RNA ISLT QL NAA+PROBE: NOT DETECTED
HPIV2 RNA SPEC QL NAA+PROBE: NOT DETECTED
HPIV3 RNA NPH QL NAA+PROBE: NOT DETECTED
HPIV4 P GENE NPH QL NAA+PROBE: NOT DETECTED
IMM GRANULOCYTES # BLD AUTO: 0.11 10*3/MM3 (ref 0–0.05)
IMM GRANULOCYTES NFR BLD AUTO: 0.7 % (ref 0–0.5)
LDLC SERPL CALC-MCNC: 184 MG/DL (ref 0–100)
LDLC/HDLC SERPL: 4.79 {RATIO}
LEFT ATRIUM VOLUME INDEX: 25.7 ML/M2
LIPASE SERPL-CCNC: 33 U/L (ref 13–60)
LYMPHOCYTES # BLD AUTO: 1.48 10*3/MM3 (ref 0.7–3.1)
LYMPHOCYTES NFR BLD AUTO: 10 % (ref 19.6–45.3)
M PNEUMO IGG SER IA-ACNC: NOT DETECTED
MAGNESIUM SERPL-MCNC: 2.1 MG/DL (ref 1.6–2.4)
MAGNESIUM SERPL-MCNC: 2.2 MG/DL (ref 1.6–2.4)
MCH RBC QN AUTO: 25.5 PG (ref 26.6–33)
MCH RBC QN AUTO: 26.4 PG (ref 26.6–33)
MCHC RBC AUTO-ENTMCNC: 31 G/DL (ref 31.5–35.7)
MCHC RBC AUTO-ENTMCNC: 33.4 G/DL (ref 31.5–35.7)
MCV RBC AUTO: 78.9 FL (ref 79–97)
MCV RBC AUTO: 82.2 FL (ref 79–97)
MONOCYTES # BLD AUTO: 0.91 10*3/MM3 (ref 0.1–0.9)
MONOCYTES NFR BLD AUTO: 6.2 % (ref 5–12)
NEUTROPHILS NFR BLD AUTO: 12.06 10*3/MM3 (ref 1.7–7)
NEUTROPHILS NFR BLD AUTO: 81.9 % (ref 42.7–76)
NRBC BLD AUTO-RTO: 0 /100 WBC (ref 0–0.2)
NT-PROBNP SERPL-MCNC: 98.7 PG/ML (ref 0–900)
PLATELET # BLD AUTO: 307 10*3/MM3 (ref 140–450)
PLATELET # BLD AUTO: 349 10*3/MM3 (ref 140–450)
PMV BLD AUTO: 9.4 FL (ref 6–12)
PMV BLD AUTO: 9.5 FL (ref 6–12)
POTASSIUM SERPL-SCNC: 3.8 MMOL/L (ref 3.5–5.2)
POTASSIUM SERPL-SCNC: 4.1 MMOL/L (ref 3.5–5.2)
PROT SERPL-MCNC: 8.3 G/DL (ref 6–8.5)
QT INTERVAL: 354 MS
QT INTERVAL: 382 MS
QTC INTERVAL: 457 MS
QTC INTERVAL: 459 MS
RBC # BLD AUTO: 4.36 10*6/MM3 (ref 3.77–5.28)
RBC # BLD AUTO: 4.78 10*6/MM3 (ref 3.77–5.28)
RHINOVIRUS RNA SPEC NAA+PROBE: NOT DETECTED
RSV RNA NPH QL NAA+NON-PROBE: NOT DETECTED
SARS-COV-2 RNA NPH QL NAA+NON-PROBE: NOT DETECTED
SODIUM SERPL-SCNC: 135 MMOL/L (ref 136–145)
SODIUM SERPL-SCNC: 137 MMOL/L (ref 136–145)
TRIGL SERPL-MCNC: 155 MG/DL (ref 0–150)
TROPONIN T DELTA: 0 NG/L
TROPONIN T SERPL HS-MCNC: 7 NG/L
TROPONIN T SERPL HS-MCNC: 7 NG/L
TSH SERPL DL<=0.05 MIU/L-ACNC: 1.91 UIU/ML (ref 0.27–4.2)
VLDLC SERPL-MCNC: 29 MG/DL (ref 5–40)
WBC NRBC COR # BLD: 13.25 10*3/MM3 (ref 3.4–10.8)
WBC NRBC COR # BLD: 14.75 10*3/MM3 (ref 3.4–10.8)
WHOLE BLOOD HOLD COAG: NORMAL

## 2023-09-02 PROCEDURE — 85379 FIBRIN DEGRADATION QUANT: CPT | Performed by: INTERNAL MEDICINE

## 2023-09-02 PROCEDURE — 94799 UNLISTED PULMONARY SVC/PX: CPT

## 2023-09-02 PROCEDURE — 93010 ELECTROCARDIOGRAM REPORT: CPT | Performed by: INTERNAL MEDICINE

## 2023-09-02 PROCEDURE — 83036 HEMOGLOBIN GLYCOSYLATED A1C: CPT | Performed by: INTERNAL MEDICINE

## 2023-09-02 PROCEDURE — 87205 SMEAR GRAM STAIN: CPT | Performed by: INTERNAL MEDICINE

## 2023-09-02 PROCEDURE — 93306 TTE W/DOPPLER COMPLETE: CPT

## 2023-09-02 PROCEDURE — 83880 ASSAY OF NATRIURETIC PEPTIDE: CPT | Performed by: FAMILY MEDICINE

## 2023-09-02 PROCEDURE — 82550 ASSAY OF CK (CPK): CPT | Performed by: FAMILY MEDICINE

## 2023-09-02 PROCEDURE — 83735 ASSAY OF MAGNESIUM: CPT | Performed by: INTERNAL MEDICINE

## 2023-09-02 PROCEDURE — 93005 ELECTROCARDIOGRAM TRACING: CPT | Performed by: INTERNAL MEDICINE

## 2023-09-02 PROCEDURE — 99284 EMERGENCY DEPT VISIT MOD MDM: CPT

## 2023-09-02 PROCEDURE — G0378 HOSPITAL OBSERVATION PER HR: HCPCS

## 2023-09-02 PROCEDURE — 83735 ASSAY OF MAGNESIUM: CPT | Performed by: FAMILY MEDICINE

## 2023-09-02 PROCEDURE — 96376 TX/PRO/DX INJ SAME DRUG ADON: CPT

## 2023-09-02 PROCEDURE — 36415 COLL VENOUS BLD VENIPUNCTURE: CPT | Performed by: INTERNAL MEDICINE

## 2023-09-02 PROCEDURE — 84484 ASSAY OF TROPONIN QUANT: CPT | Performed by: FAMILY MEDICINE

## 2023-09-02 PROCEDURE — 93005 ELECTROCARDIOGRAM TRACING: CPT | Performed by: FAMILY MEDICINE

## 2023-09-02 PROCEDURE — 84443 ASSAY THYROID STIM HORMONE: CPT | Performed by: INTERNAL MEDICINE

## 2023-09-02 PROCEDURE — 25010000002 METHYLPREDNISOLONE PER 40 MG: Performed by: NURSE PRACTITIONER

## 2023-09-02 PROCEDURE — 94640 AIRWAY INHALATION TREATMENT: CPT

## 2023-09-02 PROCEDURE — 96375 TX/PRO/DX INJ NEW DRUG ADDON: CPT

## 2023-09-02 PROCEDURE — 96365 THER/PROPH/DIAG IV INF INIT: CPT

## 2023-09-02 PROCEDURE — 85027 COMPLETE CBC AUTOMATED: CPT | Performed by: INTERNAL MEDICINE

## 2023-09-02 PROCEDURE — 80053 COMPREHEN METABOLIC PANEL: CPT | Performed by: FAMILY MEDICINE

## 2023-09-02 PROCEDURE — 96367 TX/PROPH/DG ADDL SEQ IV INF: CPT

## 2023-09-02 PROCEDURE — 25010000002 CEFTRIAXONE PER 250 MG: Performed by: INTERNAL MEDICINE

## 2023-09-02 PROCEDURE — 94760 N-INVAS EAR/PLS OXIMETRY 1: CPT

## 2023-09-02 PROCEDURE — 71275 CT ANGIOGRAPHY CHEST: CPT

## 2023-09-02 PROCEDURE — 94664 DEMO&/EVAL PT USE INHALER: CPT

## 2023-09-02 PROCEDURE — 71045 X-RAY EXAM CHEST 1 VIEW: CPT

## 2023-09-02 PROCEDURE — 80061 LIPID PANEL: CPT | Performed by: INTERNAL MEDICINE

## 2023-09-02 PROCEDURE — 84484 ASSAY OF TROPONIN QUANT: CPT | Performed by: INTERNAL MEDICINE

## 2023-09-02 PROCEDURE — 83690 ASSAY OF LIPASE: CPT | Performed by: FAMILY MEDICINE

## 2023-09-02 PROCEDURE — 25510000001 IOPAMIDOL PER 1 ML: Performed by: INTERNAL MEDICINE

## 2023-09-02 PROCEDURE — 0202U NFCT DS 22 TRGT SARS-COV-2: CPT | Performed by: NURSE PRACTITIONER

## 2023-09-02 PROCEDURE — 87070 CULTURE OTHR SPECIMN AEROBIC: CPT | Performed by: INTERNAL MEDICINE

## 2023-09-02 PROCEDURE — 93306 TTE W/DOPPLER COMPLETE: CPT | Performed by: INTERNAL MEDICINE

## 2023-09-02 PROCEDURE — 85025 COMPLETE CBC W/AUTO DIFF WBC: CPT | Performed by: FAMILY MEDICINE

## 2023-09-02 PROCEDURE — 25010000002 AZITHROMYCIN PER 500 MG: Performed by: INTERNAL MEDICINE

## 2023-09-02 RX ORDER — IPRATROPIUM BROMIDE AND ALBUTEROL SULFATE 2.5; .5 MG/3ML; MG/3ML
3 SOLUTION RESPIRATORY (INHALATION) EVERY 4 HOURS PRN
Status: DISCONTINUED | OUTPATIENT
Start: 2023-09-02 | End: 2023-09-03 | Stop reason: HOSPADM

## 2023-09-02 RX ORDER — POLYETHYLENE GLYCOL 3350 17 G/17G
17 POWDER, FOR SOLUTION ORAL DAILY PRN
Status: DISCONTINUED | OUTPATIENT
Start: 2023-09-02 | End: 2023-09-03 | Stop reason: HOSPADM

## 2023-09-02 RX ORDER — MONTELUKAST SODIUM 10 MG/1
10 TABLET ORAL DAILY
Status: DISCONTINUED | OUTPATIENT
Start: 2023-09-02 | End: 2023-09-03 | Stop reason: HOSPADM

## 2023-09-02 RX ORDER — ASPIRIN 81 MG/1
81 TABLET, CHEWABLE ORAL DAILY
Status: DISCONTINUED | OUTPATIENT
Start: 2023-09-02 | End: 2023-09-03 | Stop reason: HOSPADM

## 2023-09-02 RX ORDER — SODIUM CHLORIDE 0.9 % (FLUSH) 0.9 %
10 SYRINGE (ML) INJECTION EVERY 12 HOURS SCHEDULED
Status: DISCONTINUED | OUTPATIENT
Start: 2023-09-02 | End: 2023-09-03 | Stop reason: HOSPADM

## 2023-09-02 RX ORDER — ENOXAPARIN SODIUM 100 MG/ML
40 INJECTION SUBCUTANEOUS DAILY
Status: DISCONTINUED | OUTPATIENT
Start: 2023-09-02 | End: 2023-09-03 | Stop reason: HOSPADM

## 2023-09-02 RX ORDER — METHYLPREDNISOLONE SODIUM SUCCINATE 40 MG/ML
40 INJECTION, POWDER, LYOPHILIZED, FOR SOLUTION INTRAMUSCULAR; INTRAVENOUS EVERY 12 HOURS
Status: DISCONTINUED | OUTPATIENT
Start: 2023-09-02 | End: 2023-09-03

## 2023-09-02 RX ORDER — BENZONATATE 100 MG/1
200 CAPSULE ORAL 3 TIMES DAILY PRN
Status: DISCONTINUED | OUTPATIENT
Start: 2023-09-02 | End: 2023-09-03 | Stop reason: HOSPADM

## 2023-09-02 RX ORDER — NITROGLYCERIN 0.4 MG/1
0.4 TABLET SUBLINGUAL
Status: DISCONTINUED | OUTPATIENT
Start: 2023-09-02 | End: 2023-09-02 | Stop reason: SDUPTHER

## 2023-09-02 RX ORDER — PANTOPRAZOLE SODIUM 40 MG/1
40 TABLET, DELAYED RELEASE ORAL
Status: DISCONTINUED | OUTPATIENT
Start: 2023-09-02 | End: 2023-09-03 | Stop reason: HOSPADM

## 2023-09-02 RX ORDER — ACETAMINOPHEN 325 MG/1
650 TABLET ORAL EVERY 6 HOURS PRN
Status: DISCONTINUED | OUTPATIENT
Start: 2023-09-02 | End: 2023-09-03 | Stop reason: HOSPADM

## 2023-09-02 RX ORDER — BISACODYL 5 MG/1
5 TABLET, DELAYED RELEASE ORAL DAILY PRN
Status: DISCONTINUED | OUTPATIENT
Start: 2023-09-02 | End: 2023-09-03 | Stop reason: HOSPADM

## 2023-09-02 RX ORDER — NITROGLYCERIN 0.4 MG/1
0.4 TABLET SUBLINGUAL
Status: DISCONTINUED | OUTPATIENT
Start: 2023-09-02 | End: 2023-09-03 | Stop reason: HOSPADM

## 2023-09-02 RX ORDER — ASPIRIN 81 MG/1
324 TABLET, CHEWABLE ORAL ONCE
Status: COMPLETED | OUTPATIENT
Start: 2023-09-02 | End: 2023-09-02

## 2023-09-02 RX ORDER — SODIUM CHLORIDE 9 MG/ML
40 INJECTION, SOLUTION INTRAVENOUS AS NEEDED
Status: DISCONTINUED | OUTPATIENT
Start: 2023-09-02 | End: 2023-09-03 | Stop reason: HOSPADM

## 2023-09-02 RX ORDER — BISACODYL 10 MG
10 SUPPOSITORY, RECTAL RECTAL DAILY PRN
Status: DISCONTINUED | OUTPATIENT
Start: 2023-09-02 | End: 2023-09-03 | Stop reason: HOSPADM

## 2023-09-02 RX ORDER — SODIUM CHLORIDE 0.9 % (FLUSH) 0.9 %
10 SYRINGE (ML) INJECTION AS NEEDED
Status: DISCONTINUED | OUTPATIENT
Start: 2023-09-02 | End: 2023-09-03 | Stop reason: HOSPADM

## 2023-09-02 RX ORDER — AMOXICILLIN 250 MG
2 CAPSULE ORAL 2 TIMES DAILY
Status: DISCONTINUED | OUTPATIENT
Start: 2023-09-02 | End: 2023-09-03 | Stop reason: HOSPADM

## 2023-09-02 RX ADMIN — IPRATROPIUM BROMIDE AND ALBUTEROL SULFATE 3 ML: .5; 3 SOLUTION RESPIRATORY (INHALATION) at 06:46

## 2023-09-02 RX ADMIN — IPRATROPIUM BROMIDE AND ALBUTEROL SULFATE 3 ML: .5; 3 SOLUTION RESPIRATORY (INHALATION) at 15:53

## 2023-09-02 RX ADMIN — Medication 10 ML: at 22:09

## 2023-09-02 RX ADMIN — ASPIRIN 81 MG: 81 TABLET, CHEWABLE ORAL at 08:46

## 2023-09-02 RX ADMIN — AZITHROMYCIN MONOHYDRATE 500 MG: 500 INJECTION, POWDER, LYOPHILIZED, FOR SOLUTION INTRAVENOUS at 11:14

## 2023-09-02 RX ADMIN — CEFTRIAXONE SODIUM 1000 MG: 1 INJECTION, POWDER, FOR SOLUTION INTRAMUSCULAR; INTRAVENOUS at 10:24

## 2023-09-02 RX ADMIN — NITROGLYCERIN 0.4 MG: 0.4 TABLET, ORALLY DISINTEGRATING SUBLINGUAL at 02:06

## 2023-09-02 RX ADMIN — METHYLPREDNISOLONE SODIUM SUCCINATE 40 MG: 40 INJECTION, POWDER, FOR SOLUTION INTRAMUSCULAR; INTRAVENOUS at 10:23

## 2023-09-02 RX ADMIN — IPRATROPIUM BROMIDE AND ALBUTEROL SULFATE 3 ML: .5; 3 SOLUTION RESPIRATORY (INHALATION) at 19:43

## 2023-09-02 RX ADMIN — IOPAMIDOL 46 ML: 755 INJECTION, SOLUTION INTRAVENOUS at 07:41

## 2023-09-02 RX ADMIN — ASPIRIN 324 MG: 81 TABLET, CHEWABLE ORAL at 02:04

## 2023-09-02 RX ADMIN — METHYLPREDNISOLONE SODIUM SUCCINATE 40 MG: 40 INJECTION, POWDER, FOR SOLUTION INTRAMUSCULAR; INTRAVENOUS at 22:09

## 2023-09-02 RX ADMIN — PANTOPRAZOLE SODIUM 40 MG: 40 TABLET, DELAYED RELEASE ORAL at 05:45

## 2023-09-02 NOTE — H&P
[facility] Medicine Admission      Date of Admission: 9/2/2023      Primary Care Physician: Skylar Ferrer APRN      Chief Complaint: Chest pain    HPI:  Patient is a 68-year-old female with known past medical history of pulmonary fibrosis with chronic shortness of breath severe gastroesophageal reflux disease, hyperlipidemia who presented to ER with complaint of left-sided chest pain with radiation to left shoulder for several hours.  Patient was given in ER dose of nitro with resolution of pain.  Hospitalist service was called for observation of the patient.    I have seen and examined patient in ED room 11.  Her  is at bedside.  Patient reported last night she started having left-sided moderate to severe sharp chest pain with radiation to her her left arm and related to her jaw.  She had nausea but no vomiting.  Her pain subsided and resolved after receiving nitro.  She was not sure if her pain was related to her reflux or something else.  She reported having chronic shortness of breath related to her pulmonary fibrosis.  She has chronic nonspecific dizziness.  She denies any fall injury trauma fever chills headache sore throat syncope near syncope palpitation abdominal pain back pain URI UTI-like symptoms constipation diarrhea, bleeding in particular.  She is currently complaint free and asked why she needs to remain in hospital.    Concurrent Medical History:  has a past medical history of Anesthesia complication, Arthritis, Asthma, Elevated cholesterol, Endometriosis, Falls, GERD (gastroesophageal reflux disease), Osteoporosis, Pulmonary fibrosis, Scoliosis of thoracolumbar spine, Seasonal rhinitis, and Sinusitis.    Past Surgical History:  has a past surgical history that includes Salpingoophorectomy; Appendectomy; Hysterectomy (1984); Forearm surgery; Wrist fracture surgery (Right); Esophagogastroduodenoscopy (N/A, 10/16/2019); Colonoscopy  (07/05/2022); Esophagogastroduodenoscopy (N/A, 06/30/2020); Colonoscopy (N/A, 06/30/2020); Esophagogastroduodenoscopy (N/A, 11/17/2021); Upper gastrointestinal endoscopy (10/16/2019); Upper gastrointestinal endoscopy (06/30/2020); Upper gastrointestinal endoscopy (11/17/2021); Esophagogastroduodenoscopy (N/A, 07/05/2022); and Esophagogastroduodenoscopy (N/A, 4/20/2023).    Family History: family history includes Heart failure in her mother; Thyroid disease in her mother; Ulcerative colitis in her father.     Social History:  reports that she quit smoking about 21 years ago. Her smoking use included cigarettes. She started smoking about 41 years ago. She has a 50.00 pack-year smoking history. She has been exposed to tobacco smoke. She has never used smokeless tobacco. She reports that she does not currently use alcohol. She reports that she does not use drugs.    Allergies:   Allergies   Allergen Reactions    Sulfa Antibiotics Other (See Comments)     Low platelets    Levaquin [Levofloxacin] Diarrhea and Nausea And Vomiting    Augmentin [Amoxicillin-Pot Clavulanate] Diarrhea    Cefuroxime Diarrhea       Medications:   Prior to Admission medications    Medication Sig Start Date End Date Taking? Authorizing Provider   acetaminophen-codeine (TYLENOL #3) 300-30 MG per tablet Take 1 tablet by mouth 2 (Two) Times a Day As Needed for Moderate Pain . 7/21/22   Neena Kee APRN   albuterol (PROVENTIL) (2.5 MG/3ML) 0.083% nebulizer solution Take 2.5 mg by nebulization 4 (Four) Times a Day As Needed for Wheezing. Mix with atrovent 8/1/23   Charlene Sheffield APRN   albuterol sulfate HFA (Ventolin HFA) 108 (90 Base) MCG/ACT inhaler Inhale 2 puffs Every 4 (Four) Hours As Needed for Wheezing or Shortness of Air. 12/15/22   Mariah De Jesus DO   benzonatate (TESSALON) 200 MG capsule Take 1 capsule by mouth 3 (Three) Times a Day As Needed for Cough.    Provider, MD Roosevelt   Biotin 1 MG capsule Take 1 capsule by mouth  Daily.    Roosevelt Bryant MD   Budeson-Glycopyrrol-Formoterol (Breztri Aerosphere) 160-9-4.8 MCG/ACT aerosol inhaler Inhale 2 puffs 2 (Two) Times a Day. 12/15/22   Mariah De Jesus DO   Calcium-Magnesium-Vitamin D - MG-MG-UNIT tablet sustained-release 24 hour Take 2 tablets by mouth Daily.    Roosevelt Bryant MD   Cranberry 500 MG tablet Take 500 mg by mouth 2 (two) times a day.    Roosevelt Bryant MD   dicyclomine (BENTYL) 20 MG tablet Take 1 tablet by mouth Every 6 (Six) Hours. 4/27/23   Evan Rondon MD   ezetimibe (ZETIA) 10 MG tablet Take 1 tablet by mouth Daily.    Roosevelt Bryant MD   famotidine (PEPCID) 20 MG tablet TAKE 1 TABLET BY MOUTH DAILY. 8/29/23   Evan Rondon MD   fexofenadine (Allegra Allergy) 180 MG tablet Take 1 tablet by mouth Daily. 12/29/22   Mariah De Jesus DO   fluticasone (FLONASE) 50 MCG/ACT nasal spray 2 sprays into each nostril daily 7/1/21   Mariah De Jesus DO   ipratropium (ATROVENT) 0.02 % nebulizer solution Take 2.5 mL by nebulization 4 (Four) Times a Day As Needed for Wheezing or Shortness of Air. Mix with albuterol 3/28/23   Charlene Sheffield APRN   ipratropium-albuterol (DUO-NEB) 0.5-2.5 mg/3 ml nebulizer Take 3 mL by nebulization Every 4 (Four) Hours As Needed for Wheezing or Shortness of Air. 12/15/22   Mariah De Jesus DO   montelukast (SINGULAIR) 10 MG tablet Take 1 tablet by mouth Daily. 3/1/22   Roosevelt Bryant MD   olopatadine (PATANASE) 0.6 % solution nasal solution 2 sprays by Each Nare route 2 (Two) Times a Day. 7/1/21   Mariah De Jesus DO   Omega-3 Fatty Acids (FISH OIL OMEGA-3 PO) Take  by mouth.    Roosevelt Bryant MD   pantoprazole (PROTONIX) 40 MG EC tablet Take 1 tablet by mouth Daily. 4/27/23   Evan Rondon MD   vitamin D3 125 MCG (5000 UT) capsule capsule Take 1 capsule by mouth Daily. Takes 2 daily    Provider, MD Roosevelt       Review of Systems:  Review of Systems   Constitutional:  Negative  for chills, diaphoresis, fatigue and fever.   HENT:  Negative for congestion, dental problem, ear pain, facial swelling, rhinorrhea and sinus pressure.    Eyes:  Negative for photophobia, discharge, redness, itching and visual disturbance.   Respiratory:  Positive for shortness of breath. Negative for apnea, cough, choking, chest tightness, wheezing and stridor.    Cardiovascular:  Positive for chest pain. Negative for palpitations and leg swelling.   Gastrointestinal:  Negative for abdominal distention, abdominal pain, anal bleeding, blood in stool, diarrhea, nausea, rectal pain and vomiting.   Endocrine: Negative for cold intolerance, heat intolerance, polydipsia, polyphagia and polyuria.   Genitourinary:  Negative for difficulty urinating, flank pain, frequency, hematuria and urgency.   Musculoskeletal:  Negative for arthralgias, back pain, joint swelling and myalgias.   Skin:  Negative for pallor, rash and wound.   Allergic/Immunologic: Negative for environmental allergies and immunocompromised state.   Neurological:  Negative for dizziness, tremors, seizures, facial asymmetry, speech difficulty, weakness, light-headedness, numbness and headaches.   Hematological:  Negative for adenopathy. Does not bruise/bleed easily.   Psychiatric/Behavioral:  Negative for agitation, behavioral problems and hallucinations. The patient is not nervous/anxious.     Otherwise complete ROS is negative except as mentioned above.    Physical Exam:   Temp:  [98 °F (36.7 °C)] 98 °F (36.7 °C)  Heart Rate:  [] 86  Resp:  [18] 18  BP: (128-181)/(64-87) 137/68  Physical Exam  Constitutional:       General: She is not in acute distress.     Appearance: She is normal weight. She is not ill-appearing, toxic-appearing or diaphoretic.   HENT:      Head: Normocephalic and atraumatic.      Right Ear: External ear normal.      Left Ear: External ear normal.      Nose: Nose normal.      Mouth/Throat:      Mouth: Mucous membranes are moist.       Pharynx: Oropharynx is clear.   Eyes:      Extraocular Movements: Extraocular movements intact.      Conjunctiva/sclera: Conjunctivae normal.      Pupils: Pupils are equal, round, and reactive to light.   Cardiovascular:      Rate and Rhythm: Normal rate and regular rhythm.      Heart sounds: No murmur heard.    No friction rub. No gallop.   Pulmonary:      Effort: No respiratory distress.      Breath sounds: No stridor. Rales present. No wheezing.   Chest:      Chest wall: No tenderness.   Abdominal:      General: Abdomen is flat. There is no distension.      Palpations: Abdomen is soft.      Tenderness: There is no abdominal tenderness. There is no guarding or rebound.   Musculoskeletal:         General: No swelling or tenderness.      Cervical back: No rigidity or tenderness.      Right lower leg: No edema.      Left lower leg: No edema.   Lymphadenopathy:      Cervical: No cervical adenopathy.   Skin:     General: Skin is warm and dry.      Coloration: Skin is not jaundiced.      Findings: No erythema.   Neurological:      Mental Status: She is alert and oriented to person, place, and time. Mental status is at baseline.      Sensory: No sensory deficit.      Motor: No weakness.      Coordination: Coordination normal.   Psychiatric:         Mood and Affect: Mood normal.         Behavior: Behavior normal.         Judgment: Judgment normal.         Results Reviewed:  I have personally reviewed current lab, radiology, and data and agree with results.  Lab Results (last 24 hours)       Procedure Component Value Units Date/Time    High Sensitivity Troponin T 2Hr [[id number]] Collected: 09/02/23 0313    Specimen: Blood Updated: 09/02/23 0351    Extra Tubes [[id number]] Collected: 09/02/23 0106    Specimen: Blood from Arm, Right Updated: 09/02/23 0216    Narrative:      The following orders were created for panel order Extra Tubes.  Procedure                               Abnormality         Status                      ---------                               -----------         ------                     Gold Top - SST[[id number]]                                   Final result               Law Top[[id number]]                                         In process                 Light Blue Top[[id number]]                                   Final result                 Please view results for these tests on the individual orders.    Gold Top - SST [[id number]] Collected: 09/02/23 0106    Specimen: Blood from Arm, Right Updated: 09/02/23 0216     Extra Tube Hold for add-ons.     Comment: Auto resulted.       Light Blue Top [[id number]] Collected: 09/02/23 0106    Specimen: Blood from Arm, Right Updated: 09/02/23 0216     Extra Tube Hold for add-ons.     Comment: Auto resulted       Comprehensive Metabolic Panel [[id number]]  (Abnormal) Collected: 09/02/23 0106    Specimen: Blood from Arm, Right Updated: 09/02/23 0155     Glucose 112 mg/dL      BUN 12 mg/dL      Creatinine 0.78 mg/dL      Sodium 135 mmol/L      Potassium 3.8 mmol/L      Chloride 98 mmol/L      CO2 26.0 mmol/L      Calcium 9.3 mg/dL      Total Protein 8.3 g/dL      Albumin 3.8 g/dL      ALT (SGPT) 19 U/L      AST (SGOT) 25 U/L      Alkaline Phosphatase 129 U/L      Total Bilirubin <0.2 mg/dL      Globulin 4.5 gm/dL      A/G Ratio 0.8 g/dL      BUN/Creatinine Ratio 15.4     Anion Gap 11.0 mmol/L      eGFR 83.9 mL/min/1.73     Narrative:      GFR Normal >60  Chronic Kidney Disease <60  Kidney Failure <15      Magnesium [[id number]]  (Normal) Collected: 09/02/23 0106    Specimen: Blood from Arm, Right Updated: 09/02/23 0155     Magnesium 2.1 mg/dL     Lipase [[id number]]  (Normal) Collected: 09/02/23 0106    Specimen: Blood from Arm, Right Updated: 09/02/23 0155     Lipase 33 U/L     High Sensitivity Troponin T [[id number]]  (Normal) Collected: 09/02/23 0106    Specimen: Blood from Arm, Right Updated: 09/02/23 0155     HS Troponin T 7 ng/L     Narrative:      High Sensitive  Troponin T Reference Range:  <10.0 ng/L- Negative Female for AMI  <15.0 ng/L- Negative Male for AMI  >=10 - Abnormal Female indicating possible myocardial injury.  >=15 - Abnormal Male indicating possible myocardial injury.   Clinicians would have to utilize clinical acumen, EKG, Troponin, and serial changes to determine if it is an Acute Myocardial Infarction or myocardial injury due to an underlying chronic condition.         CK [[id number]]  (Normal) Collected: 09/02/23 0106    Specimen: Blood from Arm, Right Updated: 09/02/23 0155     Creatine Kinase 29 U/L     BNP [[id number]]  (Normal) Collected: 09/02/23 0106    Specimen: Blood from Arm, Right Updated: 09/02/23 0153     proBNP 98.7 pg/mL     Narrative:      Among patients with dyspnea, NT-proBNP is highly sensitive for the detection of acute congestive heart failure. In addition NT-proBNP of <300 pg/ml effectively rules out acute congestive heart failure with 99% negative predictive value.      CBC & Differential [[id number]]  (Abnormal) Collected: 09/02/23 0106    Specimen: Blood from Arm, Right Updated: 09/02/23 0118    Narrative:      The following orders were created for panel order CBC & Differential.  Procedure                               Abnormality         Status                     ---------                               -----------         ------                     CBC Auto Differential[[id number]]        Abnormal            Final result                 Please view results for these tests on the individual orders.    CBC Auto Differential [[id number]]  (Abnormal) Collected: 09/02/23 0106    Specimen: Blood from Arm, Right Updated: 09/02/23 0118     WBC 14.75 10*3/mm3      RBC 4.78 10*6/mm3      Hemoglobin 12.2 g/dL      Hematocrit 39.3 %      MCV 82.2 fL      MCH 25.5 pg      MCHC 31.0 g/dL      RDW 14.2 %      RDW-SD 42.5 fl      MPV 9.5 fL      Platelets 349 10*3/mm3      Neutrophil % 81.9 %      Lymphocyte % 10.0 %      Monocyte % 6.2 %       Eosinophil % 0.9 %      Basophil % 0.3 %      Immature Grans % 0.7 %      Neutrophils, Absolute 12.06 10*3/mm3      Lymphocytes, Absolute 1.48 10*3/mm3      Monocytes, Absolute 0.91 10*3/mm3      Eosinophils, Absolute 0.14 10*3/mm3      Basophils, Absolute 0.05 10*3/mm3      Immature Grans, Absolute 0.11 10*3/mm3      nRBC 0.0 /100 WBC     Law Top [[id number]] Collected: 09/02/23 0106    Specimen: Blood from Arm, Right Updated: 09/02/23 0115          Imaging Results (Last 24 Hours)       Procedure Component Value Units Date/Time    XR Chest 1 View [[id number]] Collected: 09/02/23 0232     Updated: 09/02/23 0237    Narrative:      XR CHEST 1 VIEW    HISTORY: chest pain    COMPARISON: 10/28/2022 and CT from 7/20/2023.    FINDINGS:  Frontal view of the chest was obtained.    There is chronic interstitial thickening similar to prior..This is most  prominent near the bases.  Cardiac silhouette is within normal limits. There is  no significant pleural effusion or pneumothorax.    The osseous structures and surrounding soft tissues demonstrate no acute  abnormality.    Upper abdomen is unremarkable.        Impression:      1. Stable basilar fibrosis without definite superimposed acute infiltrate.                  Assessment:    Active Hospital Problems    Diagnosis     **Chest pain      # Chest pain syndrome   # Pulmonary fibrosis with chronic shortness of breath  # Gastroesophageal reflux disease   # Dyslipidemia   # Leukocytosis, reactive   # Mild hyponatremia        Treatment plan:  I discussed the care with ED attending Dr. Fisher patient and her  at bedside.  I reviewed independently laboratory work-up imaging studies and EKG.  Placed on telemetry  Placed on observation  Obtain serial EKG and cardiac enzymes  Obtain further laboratory work-up including lipid panel hemoglobin A1c level TSH level  Follow CBC BMP magnesium level.  Leukocytosis at this time is nonspecific.  I do not appreciate any signs symptoms of  infection.  We will follow her CBC as we will monitor clinical status for any signs symptoms of infection.  Obtain echocardiogram.  Considering her sinus tachycardia obtain D-dimer level for reassurance.  If D-dimer level is not significantly elevated pulmonary thromboembolic event as cause of chest pain is ruled out, considering low clinical probability.  If D-dimer level elevated we will consider obtaining CTA chest to rule out pulmonary embolism.  If serial EKG and cardiac enzyme remains negative and CTA coronary arteries available over weekend we will consider obtaining CTA of the coronary arteries in a.m. otherwise this test can be arranged outpatient through primary care physician and medical care providers  Maintain on Protonix  Analgesics as needed  Comorbidities, chronic medical problems will be treated appropriately  Reconcile home medication and continue with essential home medications  Please see orders for comprehensive plan              Medical Decision Making  Number and Complexity of problems: Acute medical problems as above.  Complex decision making  Differential Diagnosis: Entertained considered and reflected in orders.    Conditions and Status:        Condition is unchanged.     MDM Data  External documents reviewed:   My EKG interpretation: Sinus tachycardia with Q waves in lead II and aVF  My plain film interpretation: Pulmonary fibrosis  Tests considered but not ordered:      Decision rules/scores evaluated (example [id number], Wells, etc):      Discussed with: ED attending Dr. Fisher and patient and her  at bedside   I have utilized all available immediate resources to obtain, update, or review the patient's current medications (including all prescriptions, over-the-counter products, herbals, cannabis/cannabidiol products, and vitamin/mineral/dietary (nutritional) supplements).          Care Planning  Shared decision making: ED attending Dr. Fisher patient and her   Code status  and discussions: Patient decided for full code.  Patient decided that her  Cecelia Garcia present at bedside be her healthcare proxy    Disposition  Social Determinants of Health that impact treatment or disposition:   I expect the patient to be discharged to home in 1-2 days.          I confirmed that the patient's Advance Care Plan is present, code status is documented, or surrogate decision maker is listed in the patient's medical record.     I have utilized all available immediate resources to obtain, update, or review the patient's current medications.     I discussed the patient's findings and my recommendations with: Patient and  both agreed with above plan of care.      Saeid Behroozi, MD   09/02/23   04:07 CDT

## 2023-09-02 NOTE — PLAN OF CARE
Goal Outcome Evaluation:              Outcome Evaluation: New admission; denies chest pain since on unit; VSS

## 2023-09-02 NOTE — ED PROVIDER NOTES
"Subjective   History of Present Illness  Quit smoking 21 years ago.     Chest Pain  Pain location:  L chest  Pain quality: pressure    Pain radiates to:  L shoulder  Pain severity:  Mild  Duration:  4 hours  Timing:  Constant  Progression:  Worsening  Chronicity:  New  Relieved by:  Nitroglycerin (ice)  Ineffective treatments: heat.  Associated symptoms: dizziness (chronic) and shortness of breath (chronic)    Associated symptoms: no abdominal pain, no cough, no diaphoresis, no dysphagia, no fatigue, no fever, no headache, no nausea, no vomiting and no weakness    Risk factors: high cholesterol and obesity    Risk factors: no coronary artery disease, no diabetes mellitus, no hypertension and no smoking      Review of Systems   Constitutional:  Negative for appetite change, chills, diaphoresis, fatigue and fever.   HENT:  Negative for congestion, ear discharge, ear pain, nosebleeds, rhinorrhea, sinus pressure, sore throat and trouble swallowing.    Eyes:  Negative for discharge and redness.   Respiratory:  Positive for shortness of breath (chronic). Negative for apnea, cough, chest tightness and wheezing.    Cardiovascular:  Positive for chest pain.   Gastrointestinal:  Negative for abdominal pain, diarrhea, nausea and vomiting.   Endocrine: Negative for polyuria.   Genitourinary:  Negative for dysuria, frequency and urgency.   Musculoskeletal:  Negative for myalgias and neck pain.   Skin:  Negative for color change and rash.   Allergic/Immunologic: Negative for immunocompromised state.   Neurological:  Positive for dizziness (chronic). Negative for seizures, syncope, weakness, light-headedness and headaches.   Hematological:  Negative for adenopathy. Does not bruise/bleed easily.   Psychiatric/Behavioral:  Negative for behavioral problems and confusion.    All other systems reviewed and are negative.    Past Medical History:   Diagnosis Date    Anesthesia complication     states she \"stopped breathing during her " "last procedure\"    Arthritis     Asthma     Bronchiectasis     Elevated cholesterol     Endometriosis     Falls     GERD (gastroesophageal reflux disease)     Osteoporosis     Pulmonary fibrosis     Scoliosis of thoracolumbar spine     Seasonal rhinitis     Sinusitis        Allergies   Allergen Reactions    Sulfa Antibiotics Other (See Comments)     Low platelets    Levaquin [Levofloxacin] Diarrhea and Nausea And Vomiting    Augmentin [Amoxicillin-Pot Clavulanate] Diarrhea    Cefuroxime Diarrhea       Past Surgical History:   Procedure Laterality Date    APPENDECTOMY      COLONOSCOPY  07/05/2022    Per Dr. Evan Chavez M.D., [facility], [place], [place].    COLONOSCOPY N/A 06/30/2020    Procedure: COLONOSCOPY;  Surgeon: Evan Chavez MD;  Location: [facility] ENDOSCOPY;  Service: Gastroenterology;  Laterality: N/A;    ENDOSCOPY N/A 10/16/2019    Procedure: ESOPHAGOGASTRODUODENOSCOPY;  Surgeon: Evan Chavez MD;  Location: [facility] ENDOSCOPY;  Service: Gastroenterology    ENDOSCOPY N/A 06/30/2020    Procedure: ESOPHAGOGASTRODUODENOSCOPY;  Surgeon: Evan Chavez MD;  Location: [facility] ENDOSCOPY;  Service: Gastroenterology;  Laterality: N/A;  savory dilation 48-54    ENDOSCOPY N/A 11/17/2021    Procedure: ESOPHAGOGASTRODUODENOSCOPY;  Surgeon: Evan Chavez MD;  Location: [facility] ENDOSCOPY;  Service: Gastroenterology;  Laterality: N/A;  48-54 f eso dil. blue    ENDOSCOPY N/A 07/05/2022    Procedure: ESOPHAGOGASTRODUODENOSCOPY;  Surgeon: Evan Chavez MD;  Location: [facility] ENDOSCOPY;  Service: Gastroenterology;  Laterality: N/A;    ENDOSCOPY N/A 4/20/2023    Procedure: ESOPHAGOGASTRODUODENOSCOPY;  Surgeon: Evan Chavez MD;  Location: [facility] ENDOSCOPY;  Service: Gastroenterology;  Laterality: N/A;    FOREARM SURGERY      HYSTERECTOMY  1984    total    SALPINGO OOPHORECTOMY      UPPER GASTROINTESTINAL ENDOSCOPY  10/16/2019    UPPER GASTROINTESTINAL ENDOSCOPY  06/30/2020    UPPER " GASTROINTESTINAL ENDOSCOPY  2021    WRIST FRACTURE SURGERY Right        Family History   Problem Relation Age of Onset    Heart failure Mother     Thyroid disease Mother     Ulcerative colitis Father        Social History     Socioeconomic History    Marital status:    Tobacco Use    Smoking status: Former     Packs/day: 2.50     Years: 20.00     Pack years: 50.00     Types: Cigarettes     Start date:      Quit date:      Years since quittin.6     Passive exposure: Past    Smokeless tobacco: Never   Vaping Use    Vaping Use: Never used   Substance and Sexual Activity    Alcohol use: Not Currently     Comment: none in 40 years    Drug use: Never    Sexual activity: Defer           Objective   Physical Exam  Vitals and nursing note reviewed.   Constitutional:       Appearance: She is well-developed.   HENT:      Head: Normocephalic and atraumatic.      Nose: Nose normal.   Eyes:      General: No scleral icterus.        Right eye: No discharge.         Left eye: No discharge.      Conjunctiva/sclera: Conjunctivae normal.      Pupils: Pupils are equal, round, and reactive to light.   Neck:      Trachea: No tracheal deviation.   Cardiovascular:      Rate and Rhythm: Normal rate and regular rhythm.      Heart sounds: Normal heart sounds. No murmur heard.  Pulmonary:      Effort: Pulmonary effort is normal. No respiratory distress.      Breath sounds: Normal breath sounds. No stridor. No wheezing or rales.   Abdominal:      General: Bowel sounds are normal. There is no distension.      Palpations: Abdomen is soft. There is no mass.      Tenderness: There is no abdominal tenderness. There is no guarding or rebound.   Musculoskeletal:      Cervical back: Normal range of motion and neck supple.   Skin:     General: Skin is warm and dry.      Findings: No erythema or rash.   Neurological:      Mental Status: She is alert and oriented to person, place, and time.      Coordination: Coordination normal.    Psychiatric:         Behavior: Behavior normal.         Thought Content: Thought content normal.       ECG 12 Lead      Date/Time: 9/2/2023 3:48 AM  Performed by: Chriss Fisher MD  Authorized by: Chriss Fisher MD   Interpreted by physician  Rhythm: sinus tachycardia  BPM: 101  ST Segments: ST segments normal             ED Course  ED Course as of 09/04/23 0744   Sat Sep 02, 2023   0349 While in the emergency department, the patient's chest pain was relieved with nitroglycerin. [CB]      ED Course User Index  [CB] Chriss Fisher MD           Labs Reviewed   COMPREHENSIVE METABOLIC PANEL - Abnormal; Notable for the following components:       Result Value    Glucose 112 (*)     Sodium 135 (*)     Alkaline Phosphatase 129 (*)     All other components within normal limits    Narrative:     GFR Normal >60  Chronic Kidney Disease <60  Kidney Failure <15     CBC WITH AUTO DIFFERENTIAL - Abnormal; Notable for the following components:    WBC 14.75 (*)     MCH 25.5 (*)     MCHC 31.0 (*)     Neutrophil % 81.9 (*)     Lymphocyte % 10.0 (*)     Immature Grans % 0.7 (*)     Neutrophils, Absolute 12.06 (*)     Monocytes, Absolute 0.91 (*)     Immature Grans, Absolute 0.11 (*)     All other components within normal limits   LIPID PANEL - Abnormal; Notable for the following components:    Total Cholesterol 251 (*)     Triglycerides 155 (*)     HDL Cholesterol 38 (*)     LDL Cholesterol  184 (*)     All other components within normal limits    Narrative:     Cholesterol Reference Ranges  (U.S. Department of Health and Human Services ATP III Classifications)    Desirable          <200 mg/dL  Borderline High    200-239 mg/dL  High Risk          >240 mg/dL      Triglyceride Reference Ranges  (U.S. Department of Health and Human Services ATP III Classifications)    Normal           <150 mg/dL  Borderline High  150-199 mg/dL  High             200-499 mg/dL  Very High        >500 mg/dL    HDL Reference  "Ranges  (U.S. Department of Health and Human Services ATP III Classifications)    Low     <40 mg/dl (major risk factor for CHD)  High    >60 mg/dl ('negative' risk factor for CHD)        LDL Reference Ranges  (U.S. Department of Health and Human Services ATP III Classifications)    Optimal          <100 mg/dL  Near Optimal     100-129 mg/dL  Borderline High  130-159 mg/dL  High             160-189 mg/dL  Very High        >189 mg/dL   D-DIMER, QUANTITATIVE - Abnormal; Notable for the following components:    D-Dimer, Quantitative 820 (*)     All other components within normal limits    Narrative:     According to the assay 's published package insert, a normal (<500 ng/mL (FEU)) D-dimer result in conjunction with a non-high clinical probability assessment, excludes deep vein thrombosis (DVT) and pulmonary embolism (PE) with high sensitivity.    D-dimer values increase with age and this can make VTE exclusion of an older population difficult. To address this, the American College [employer], based on best available evidence and recent guidelines, recommends that clinicians use age-adjusted D-dimer thresholds in patients greater than 50 years of age with: a) a low probability of PE who do not meet all Pulmonary Embolism Rule Out Criteria, or b) in those with intermediate probability of PE.   The formula for an age-adjusted D-dimer cut-off is \"age*10\".  For example, a 60 year old patient would have an age-adjusted cut-off of 600 ng/mL (FEU) and an 80 year old 800 ng/mL (FEU).     BASIC METABOLIC PANEL - Abnormal; Notable for the following components:    Glucose 102 (*)     All other components within normal limits    Narrative:     GFR Normal >60  Chronic Kidney Disease <60  Kidney Failure <15     CBC (NO DIFF) - Abnormal; Notable for the following components:    WBC 13.25 (*)     Hemoglobin 11.5 (*)     MCV 78.9 (*)     MCH 26.4 (*)     All other components within normal limits   HEMOGLOBIN A1C - " Abnormal; Notable for the following components:    Hemoglobin A1C 6.00 (*)     All other components within normal limits    Narrative:     Hemoglobin A1C Ranges:    Increased Risk for Diabetes  5.7% to 6.4%  Diabetes                     >= 6.5%  Diabetic Goal                < 7.0%   CBC WITH AUTO DIFFERENTIAL - Abnormal; Notable for the following components:    WBC 11.49 (*)     MCH 25.1 (*)     Neutrophil % 82.6 (*)     Lymphocyte % 11.1 (*)     Monocyte % 4.7 (*)     Eosinophil % 0.0 (*)     Immature Grans % 1.4 (*)     Neutrophils, Absolute 9.50 (*)     Immature Grans, Absolute 0.16 (*)     All other components within normal limits   BASIC METABOLIC PANEL - Abnormal; Notable for the following components:    Glucose 111 (*)     Sodium 135 (*)     All other components within normal limits    Narrative:     GFR Normal >60  Chronic Kidney Disease <60  Kidney Failure <15     RESPIRATORY PANEL PCR W/ COVID-19 (SARS-COV-2) NIDA/LAUREANO/JARAD/PAD/COR/MAD/GASPER IN-HOUSE, NP SWAB IN [facility], 3-4 HR TAT - Normal    Narrative:     In the setting of a positive respiratory panel with a viral infection PLUS a negative procalcitonin without other underlying concern for bacterial infection, consider observing off antibiotics or discontinuation of antibiotics and continue supportive care. If the respiratory panel is positive for atypical bacterial infection (Bordetella pertussis, Chlamydophila pneumoniae, or Mycoplasma pneumoniae), consider antibiotic de-escalation to target atypical bacterial infection.   LIPASE - Normal   BNP (IN-HOUSE) - Normal    Narrative:     Among patients with dyspnea, NT-proBNP is highly sensitive for the detection of acute congestive heart failure. In addition NT-proBNP of <300 pg/ml effectively rules out acute congestive heart failure with 99% negative predictive value.     TROPONIN - Normal    Narrative:     High Sensitive Troponin T Reference Range:  <10.0 ng/L- Negative Female for AMI  <15.0 ng/L- Negative  Male for AMI  >=10 - Abnormal Female indicating possible myocardial injury.  >=15 - Abnormal Male indicating possible myocardial injury.   Clinicians would have to utilize clinical acumen, EKG, Troponin, and serial changes to determine if it is an Acute Myocardial Infarction or myocardial injury due to an underlying chronic condition.        CK - Normal   MAGNESIUM - Normal   HIGH SENSITIVITIY TROPONIN T 2HR - Normal    Narrative:     High Sensitive Troponin T Reference Range:  <10.0 ng/L- Negative Female for AMI  <15.0 ng/L- Negative Male for AMI  >=10 - Abnormal Female indicating possible myocardial injury.  >=15 - Abnormal Male indicating possible myocardial injury.   Clinicians would have to utilize clinical acumen, EKG, Troponin, and serial changes to determine if it is an Acute Myocardial Infarction or myocardial injury due to an underlying chronic condition.        MAGNESIUM - Normal   TSH - Normal   TROPONIN - Normal    Narrative:     High Sensitive Troponin T Reference Range:  <10.0 ng/L- Negative Female for AMI  <15.0 ng/L- Negative Male for AMI  >=10 - Abnormal Female indicating possible myocardial injury.  >=15 - Abnormal Male indicating possible myocardial injury.   Clinicians would have to utilize clinical acumen, EKG, Troponin, and serial changes to determine if it is an Acute Myocardial Infarction or myocardial injury due to an underlying chronic condition.        RESPIRATORY CULTURE   CBC AND DIFFERENTIAL    Narrative:     The following orders were created for panel order CBC & Differential.  Procedure                               Abnormality         Status                     ---------                               -----------         ------                     CBC Auto Differential[[id number]]        Abnormal            Final result                 Please view results for these tests on the individual orders.   EXTRA TUBES    Narrative:     The following orders were created for panel order Extra  Tubes.  Procedure                               Abnormality         Status                     ---------                               -----------         ------                     Gold Top - SST[[id number]]                                   Final result               Law Top[[id number]]                                         Final result               Light Blue Top[[id number]]                                   Final result                 Please view results for these tests on the individual orders.   GOLD TOP - SST   GRAY TOP   LIGHT BLUE TOP   CBC AND DIFFERENTIAL    Narrative:     The following orders were created for panel order CBC & Differential.  Procedure                               Abnormality         Status                     ---------                               -----------         ------                     CBC Auto Differential[[id number]]        Abnormal            Final result                 Please view results for these tests on the individual orders.       CT Angiogram Chest   Final Result   1. No PE, no aortic dissection.   2. Slight acute infiltration in the lung bases superimposed on severe chronic   interstitial lung disease with pulmonary fibrosis. The lung bases are slightly   worse than 7/20/2023.      XR Chest 1 View   Final Result   1. Stable basilar fibrosis without definite superimposed acute infiltrate.                                                  Medical Decision Making  Problems Addressed:  Precordial pain: complicated acute illness or injury    Amount and/or Complexity of Data Reviewed  Labs: ordered.  Radiology: ordered.  ECG/medicine tests: ordered and independent interpretation performed.    Risk  OTC drugs.  Prescription drug management.  Decision regarding hospitalization.        Final diagnoses:   Precordial pain       ED Disposition  ED Disposition       ED Disposition   Decision to Admit    Condition   --    Comment   Level of Care: Telemetry [5]   Diagnosis: Chest  pain [914161]   Admitting Physician: BEHROOZI, SAEID [[id number]]   Attending Physician: BEHROOZI, SAEID [[id number]]                 Ramila Dee MD  [address] [place] [address] [phone]      Office will call you with a date and time    Skylar Ferrer, APRN  [address] [facility] [id number] [phone]      Office will call you with a date and time         Medication List        New Prescriptions      aspirin 81 MG chewable tablet  Chew 1 tablet Daily for 30 days.     azithromycin 500 MG tablet  Commonly known as: Zithromax  Take 1 tablet by mouth Daily for 2 days.     cefdinir 300 MG capsule  Commonly known as: OMNICEF  Take 1 capsule by mouth 2 (Two) Times a Day for 4 days.     predniSONE 20 MG tablet  Commonly known as: DELTASONE  Take 2 tablets by mouth Daily With Breakfast for 4 doses.            Changed      ipratropium-albuterol 0.5-2.5 mg/3 ml nebulizer  Commonly known as: DUO-NEB  Take 3 mL by nebulization Every 4 (Four) Hours As Needed for Wheezing or Shortness of Air.  What changed: additional instructions            Stop      ezetimibe 10 MG tablet  Commonly known as: ZETIA               Where to Get Your Medications        These medications were sent to [place] [address] [facility] [address] - [phone]  - [phone] [address] [facility] [address]      Phone: [phone]   aspirin 81 MG chewable tablet  azithromycin 500 MG tablet  cefdinir 300 MG capsule  predniSONE 20 MG tablet            Chriss Fisher MD  09/04/23 0723

## 2023-09-02 NOTE — ED NOTES
Nursing report ED to floor  Genna Garcia  68 y.o.  female    HPI:   Chief Complaint   Patient presents with    Chest Pain       Admitting doctor:   Saeid Behroozi, MD    Consulting provider(s):  Consults       Date and Time Order Name Status Description    9/2/2023  3:50 AM Hospitalist (on-call MD unless specified)               Admitting diagnosis:   The encounter diagnosis was Precordial pain.    Code status:   Current Code Status       Date Active Code Status Order ID Comments User Context       Not on file            Allergies:   Sulfa antibiotics, Levaquin [levofloxacin], Augmentin [amoxicillin-pot clavulanate], and Cefuroxime    Intake and Output  No intake or output data in the 24 hours ending 09/02/23 0403    Weight:   There were no vitals filed for this visit.    Most recent vitals:   Vitals:    09/02/23 0317 09/02/23 0319 09/02/23 0332 09/02/23 0347   BP: 135/77  128/64 137/68   BP Location:       Patient Position:       Pulse: 84 82 86 86   Resp:       Temp:       TempSrc:       SpO2: 95% 95% 96% 96%     Oxygen Therapy: room air    Active LDAs/IV Access:   Lines, Drains & Airways       Active LDAs       Name Placement date Placement time Site Days    Peripheral IV 09/02/23 0103 Right Antecubital 09/02/23  0103  Antecubital  less than 1                    Labs (abnormal labs have a star):   Labs Reviewed   COMPREHENSIVE METABOLIC PANEL - Abnormal; Notable for the following components:       Result Value    Glucose 112 (*)     Sodium 135 (*)     Alkaline Phosphatase 129 (*)     All other components within normal limits    Narrative:     GFR Normal >60  Chronic Kidney Disease <60  Kidney Failure <15     CBC WITH AUTO DIFFERENTIAL - Abnormal; Notable for the following components:    WBC 14.75 (*)     MCH 25.5 (*)     MCHC 31.0 (*)     Neutrophil % 81.9 (*)     Lymphocyte % 10.0 (*)     Immature Grans % 0.7 (*)     Neutrophils, Absolute 12.06 (*)     Monocytes, Absolute 0.91 (*)     Immature Grans,  Absolute 0.11 (*)     All other components within normal limits   LIPASE - Normal   BNP (IN-HOUSE) - Normal    Narrative:     Among patients with dyspnea, NT-proBNP is highly sensitive for the detection of acute congestive heart failure. In addition NT-proBNP of <300 pg/ml effectively rules out acute congestive heart failure with 99% negative predictive value.     TROPONIN - Normal    Narrative:     High Sensitive Troponin T Reference Range:  <10.0 ng/L- Negative Female for AMI  <15.0 ng/L- Negative Male for AMI  >=10 - Abnormal Female indicating possible myocardial injury.  >=15 - Abnormal Male indicating possible myocardial injury.   Clinicians would have to utilize clinical acumen, EKG, Troponin, and serial changes to determine if it is an Acute Myocardial Infarction or myocardial injury due to an underlying chronic condition.        CK - Normal   MAGNESIUM - Normal   HIGH SENSITIVITIY TROPONIN T 2HR   CBC AND DIFFERENTIAL    Narrative:     The following orders were created for panel order CBC & Differential.  Procedure                               Abnormality         Status                     ---------                               -----------         ------                     CBC Auto Differential[[id number]]        Abnormal            Final result                 Please view results for these tests on the individual orders.   GOLD TOP - SST   LIGHT BLUE TOP   EXTRA TUBES    Narrative:     The following orders were created for panel order Extra Tubes.  Procedure                               Abnormality         Status                     ---------                               -----------         ------                     Gold Top - SST[[id number]]                                   Final result               Law Top[[id number]]                                         In process                 Light Blue Top[[id number]]                                   Final result                 Please view results for  these tests on the individual orders.   GRAY TOP       Meds given in ED:   Medications   nitroglycerin (NITROSTAT) SL tablet 0.4 mg (0.4 mg Sublingual Given 9/2/23 0206)   aspirin chewable tablet 324 mg (324 mg Oral Given 9/2/23 0204)           NIH Stroke Scale:       Isolation/Infection(s):  No active isolations   No active infections     COVID Testing  Collected NA  Resulted NA    Nursing report ED to floor:  Mental status: A/O  Ambulatory status: independently  Precautions: none    ED nurse phone extentsion- [phone]

## 2023-09-02 NOTE — PROGRESS NOTES
[facility] Medicine   INPATIENT PROGRESS NOTE      Patient Name: Genna Garcia  Date of Admission: 9/2/2023  Today's Date: 09/02/23  Length of Stay: 0  Primary Care Physician: Skylar Ferrer APRN    Subjective   Chief Complaint: chest pain   HPI     9/2/23 denies any further CP since admission.         Review of Systems     All pertinent negatives and positives are as above. All other systems have been reviewed and are negative unless otherwise stated.     Objective    Temp:  [97.3 °F (36.3 °C)-98 °F (36.7 °C)] 97.6 °F (36.4 °C)  Heart Rate:  [] 98  Resp:  [18] 18  BP: (128-181)/(64-87) 137/65      Physical Exam  Vitals and nursing note reviewed.   Constitutional:       General: She is not in acute distress.  HENT:      Head: Normocephalic and atraumatic.      Mouth/Throat:      Pharynx: Oropharynx is clear.   Eyes:      Conjunctiva/sclera: Conjunctivae normal.   Cardiovascular:      Rate and Rhythm: Normal rate and regular rhythm.      Pulses: Normal pulses.      Heart sounds: Normal heart sounds.   Pulmonary:      Effort: Pulmonary effort is normal.      Breath sounds: Normal breath sounds.   Abdominal:      General: Bowel sounds are normal. There is no distension.      Tenderness: There is no abdominal tenderness.   Musculoskeletal:         General: Normal range of motion.      Cervical back: Neck supple.   Skin:     General: Skin is warm.      Capillary Refill: Capillary refill takes less than 2 seconds.   Neurological:      Mental Status: She is alert and oriented to person, place, and time. Mental status is at baseline.   Psychiatric:         Thought Content: Thought content normal.           Results Review:  I have reviewed the labs, radiology results, and diagnostic studies.    Laboratory Data:   Results from last 7 days   Lab Units 09/02/23  0537 09/02/23  0106   WBC 10*3/mm3 13.25* 14.75*   HEMOGLOBIN g/dL 11.5* 12.2   HEMATOCRIT  % 34.4 39.3   PLATELETS 10*3/mm3 307 349        Results from last 7 days   Lab Units 09/02/23  0537 09/02/23  0106   SODIUM mmol/L 137 135*   POTASSIUM mmol/L 4.1 3.8   CHLORIDE mmol/L 101 98   CO2 mmol/L 25.0 26.0   BUN mg/dL 11 12   CREATININE mg/dL 0.72 0.78   CALCIUM mg/dL 9.2 9.3   BILIRUBIN mg/dL  --  <0.2   ALK PHOS U/L  --  129*   ALT (SGPT) U/L  --  19   AST (SGOT) U/L  --  25   GLUCOSE mg/dL 102* 112*       Culture Data:   No results found for: BLOODCX  No results found for: URINECX  No results found for: RESPCX  No results found for: WOUNDCX  No results found for: STOOLCX  No components found for: BODYFLD    Radiology Data:   Imaging Results (Last 24 Hours)       Procedure Component Value Units Date/Time    CT Angiogram Chest [[id number]] Collected: 09/02/23 0747     Updated: 09/02/23 0752    Narrative:      CTA CHEST W CONTRAST    HISTORY: Elevated D-dimer level with chest pain, rule out pulmonary embolus    COMPARISON: 7/20/2023.    Automated exposure control was also utilized to decrease patient radiation dose.    TECHNIQUE: Axial images of the chest were obtained after the administration of  intravenous contrast using pulmonary angiogram protocol. Additionally, 3D and  multiplanar reformatted images were provided.    FINDINGS:  Pulmonary arteries: There is adequate enhancement of the pulmonary arteries to  evaluate for central and segmental pulmonary emboli. There are no filling  defects within the main, lobar, segmental or visualized subsegmental pulmonary  arteries. The pulmonary arteries are within normal limits for size.    Aorta and great vessels: The aorta is well opacified and demonstrates no  dissection or aneurysm. The brachiocephalic vessels are normal in appearance.    Lungs: Severe bibasilar chronic interstitial lung disease with evidence of  honeycombing and pulmonary fibrosis in the lung bases. This is slightly worse  than the prior study indicating acute infiltration superimposed on  severe  chronic disease.    Mediastinum and lymph nodes: No enlarged mediastinal, hilar, or axillary lymph  nodes are present.    Skeletal and soft tissues: The osseous structures of the thorax and surrounding  soft tissues demonstrate no acute process.    Upper abdomen: The imaged portion of the upper abdomen demonstrates no acute  process.        Impression:      1. No PE, no aortic dissection.  2. Slight acute infiltration in the lung bases superimposed on severe chronic  interstitial lung disease with pulmonary fibrosis. The lung bases are slightly  worse than 7/20/2023.    XR Chest 1 View [[id number]] Collected: 09/02/23 0232     Updated: 09/02/23 0237    Narrative:      XR CHEST 1 VIEW    HISTORY: chest pain    COMPARISON: 10/28/2022 and CT from 7/20/2023.    FINDINGS:  Frontal view of the chest was obtained.    There is chronic interstitial thickening similar to prior..This is most  prominent near the bases.  Cardiac silhouette is within normal limits. There is  no significant pleural effusion or pneumothorax.    The osseous structures and surrounding soft tissues demonstrate no acute  abnormality.    Upper abdomen is unremarkable.        Impression:      1. Stable basilar fibrosis without definite superimposed acute infiltrate.                I have reviewed the patient's current medications.     Assessment/Plan     I have utilized all available immediate resources to obtain, update, or review the patient's current medications (including all prescriptions, over-the-counter products, herbals, cannabis/cannabidiol products, and vitamin/mineral/dietary (nutritional) supplements).      Active Hospital Problems    Diagnosis     **Chest pain      Assessment/Plan  Chest Pain  -echocardiogram shows preserved EF, no RWMA or significant valvular dysfunction  -No further CP since admission, trop negative x 2, EKG with no acute ischemic changes.    2. Bibasilar PNA, bacterial  3. ILD  -CTA negative for PE. Chronic,  severe fibrotic changes with acute bibasilar infiltrates.  - Continue ceftriaxone and azithromycin, solumedrol, bronchodilators  - Keep follow up appt with Pulmonology    VTE PPX: lovenox      Medical Decision Making  Number and Complexity of problems: multiple moderate    Conditions and Status:        Condition is improving.     MDM Data  External documents reviewed: None today  My EKG interpretation: SR with nonspecific changes  My CT interpretation: as per radiologist's interpretation  Tests considered but not ordered: None today       Discussed with: Patient, attending      Treatment Plan  As above    Care Planning  Shared decision making: With patient  Code status and discussions: full    Disposition  Social Determinants of Health that impact treatment or disposition: n/a  I expect the patient to be discharged to home in 2-3 days.      I confirmed that the patient's Advance Care Plan is present, code status is documented, or surrogate decision maker is listed in the patient's medical record.   ________________________________        Copied text in this note has been reviewed and is accurate as of 09/02/23       Electronically signed by MARCELLA Thibodeaux, 09/02/23, 17:58 CDT.

## 2023-09-03 VITALS
TEMPERATURE: 97.3 F | WEIGHT: 170.4 LBS | RESPIRATION RATE: 18 BRPM | BODY MASS INDEX: 27.38 KG/M2 | OXYGEN SATURATION: 90 % | DIASTOLIC BLOOD PRESSURE: 74 MMHG | SYSTOLIC BLOOD PRESSURE: 154 MMHG | HEART RATE: 86 BPM | HEIGHT: 66 IN

## 2023-09-03 LAB
ANION GAP SERPL CALCULATED.3IONS-SCNC: 13 MMOL/L (ref 5–15)
BASOPHILS # BLD AUTO: 0.02 10*3/MM3 (ref 0–0.2)
BASOPHILS NFR BLD AUTO: 0.2 % (ref 0–1.5)
BUN SERPL-MCNC: 13 MG/DL (ref 8–23)
BUN/CREAT SERPL: 18.6 (ref 7–25)
CALCIUM SPEC-SCNC: 9.4 MG/DL (ref 8.6–10.5)
CHLORIDE SERPL-SCNC: 100 MMOL/L (ref 98–107)
CO2 SERPL-SCNC: 22 MMOL/L (ref 22–29)
CREAT SERPL-MCNC: 0.7 MG/DL (ref 0.57–1)
DEPRECATED RDW RBC AUTO: 39.8 FL (ref 37–54)
EGFRCR SERPLBLD CKD-EPI 2021: 95.5 ML/MIN/1.73
EOSINOPHIL # BLD AUTO: 0 10*3/MM3 (ref 0–0.4)
EOSINOPHIL NFR BLD AUTO: 0 % (ref 0.3–6.2)
ERYTHROCYTE [DISTWIDTH] IN BLOOD BY AUTOMATED COUNT: 13.9 % (ref 12.3–15.4)
GLUCOSE SERPL-MCNC: 111 MG/DL (ref 65–99)
HCT VFR BLD AUTO: 39.3 % (ref 34–46.6)
HGB BLD-MCNC: 12.4 G/DL (ref 12–15.9)
IMM GRANULOCYTES # BLD AUTO: 0.16 10*3/MM3 (ref 0–0.05)
IMM GRANULOCYTES NFR BLD AUTO: 1.4 % (ref 0–0.5)
LYMPHOCYTES # BLD AUTO: 1.27 10*3/MM3 (ref 0.7–3.1)
LYMPHOCYTES NFR BLD AUTO: 11.1 % (ref 19.6–45.3)
MCH RBC QN AUTO: 25.1 PG (ref 26.6–33)
MCHC RBC AUTO-ENTMCNC: 31.6 G/DL (ref 31.5–35.7)
MCV RBC AUTO: 79.4 FL (ref 79–97)
MONOCYTES # BLD AUTO: 0.54 10*3/MM3 (ref 0.1–0.9)
MONOCYTES NFR BLD AUTO: 4.7 % (ref 5–12)
NEUTROPHILS NFR BLD AUTO: 82.6 % (ref 42.7–76)
NEUTROPHILS NFR BLD AUTO: 9.5 10*3/MM3 (ref 1.7–7)
NRBC BLD AUTO-RTO: 0 /100 WBC (ref 0–0.2)
PLATELET # BLD AUTO: 383 10*3/MM3 (ref 140–450)
PMV BLD AUTO: 9.3 FL (ref 6–12)
POTASSIUM SERPL-SCNC: 3.8 MMOL/L (ref 3.5–5.2)
RBC # BLD AUTO: 4.95 10*6/MM3 (ref 3.77–5.28)
SODIUM SERPL-SCNC: 135 MMOL/L (ref 136–145)
WBC NRBC COR # BLD: 11.49 10*3/MM3 (ref 3.4–10.8)

## 2023-09-03 PROCEDURE — G0378 HOSPITAL OBSERVATION PER HR: HCPCS

## 2023-09-03 PROCEDURE — 25010000002 CEFTRIAXONE PER 250 MG: Performed by: INTERNAL MEDICINE

## 2023-09-03 PROCEDURE — 85025 COMPLETE CBC W/AUTO DIFF WBC: CPT | Performed by: NURSE PRACTITIONER

## 2023-09-03 PROCEDURE — 25010000002 AZITHROMYCIN PER 500 MG: Performed by: INTERNAL MEDICINE

## 2023-09-03 PROCEDURE — 96366 THER/PROPH/DIAG IV INF ADDON: CPT

## 2023-09-03 PROCEDURE — 94760 N-INVAS EAR/PLS OXIMETRY 1: CPT

## 2023-09-03 PROCEDURE — 80048 BASIC METABOLIC PNL TOTAL CA: CPT | Performed by: NURSE PRACTITIONER

## 2023-09-03 PROCEDURE — 63710000001 PREDNISONE PER 1 MG: Performed by: NURSE PRACTITIONER

## 2023-09-03 PROCEDURE — 94799 UNLISTED PULMONARY SVC/PX: CPT

## 2023-09-03 RX ORDER — PREDNISONE 20 MG/1
40 TABLET ORAL
Qty: 8 TABLET | Refills: 0 | Status: SHIPPED | OUTPATIENT
Start: 2023-09-04 | End: 2023-09-08

## 2023-09-03 RX ORDER — CEFDINIR 300 MG/1
300 CAPSULE ORAL 2 TIMES DAILY
Qty: 8 CAPSULE | Refills: 0 | Status: SHIPPED | OUTPATIENT
Start: 2023-09-03 | End: 2023-09-07

## 2023-09-03 RX ORDER — AZITHROMYCIN 500 MG/1
500 TABLET, FILM COATED ORAL DAILY
Qty: 2 TABLET | Refills: 0 | Status: SHIPPED | OUTPATIENT
Start: 2023-09-03 | End: 2023-09-06

## 2023-09-03 RX ORDER — ASPIRIN 81 MG/1
81 TABLET, CHEWABLE ORAL DAILY
Qty: 30 TABLET | Refills: 0 | Status: SHIPPED | OUTPATIENT
Start: 2023-09-04 | End: 2023-10-04

## 2023-09-03 RX ORDER — PREDNISONE 20 MG/1
40 TABLET ORAL
Status: DISCONTINUED | OUTPATIENT
Start: 2023-09-03 | End: 2023-09-03 | Stop reason: HOSPADM

## 2023-09-03 RX ADMIN — AZITHROMYCIN MONOHYDRATE 500 MG: 500 INJECTION, POWDER, LYOPHILIZED, FOR SOLUTION INTRAVENOUS at 09:50

## 2023-09-03 RX ADMIN — IPRATROPIUM BROMIDE AND ALBUTEROL SULFATE 3 ML: .5; 3 SOLUTION RESPIRATORY (INHALATION) at 08:01

## 2023-09-03 RX ADMIN — Medication 10 ML: at 09:11

## 2023-09-03 RX ADMIN — ASPIRIN 81 MG: 81 TABLET, CHEWABLE ORAL at 08:38

## 2023-09-03 RX ADMIN — PANTOPRAZOLE SODIUM 40 MG: 40 TABLET, DELAYED RELEASE ORAL at 06:08

## 2023-09-03 RX ADMIN — CEFTRIAXONE SODIUM 1000 MG: 1 INJECTION, POWDER, FOR SOLUTION INTRAMUSCULAR; INTRAVENOUS at 09:11

## 2023-09-03 RX ADMIN — PREDNISONE 40 MG: 20 TABLET ORAL at 09:11

## 2023-09-03 NOTE — PLAN OF CARE
Goal Outcome Evaluation:              Outcome Evaluation: Pt sleeping at this time; ambulated in the hallway earlier tonight; VSS; plan of care continues

## 2023-09-03 NOTE — DISCHARGE SUMMARY
[facility] Medicine Services  DISCHARGE SUMMARY       Date of Admission: 9/2/2023  Date of Discharge:  9/3/2023  Primary Care Physician: Skylar Ferrer APRN    Presenting Problem/History of Present Illness:  Precordial pain [R07.2]  Chest pain [R07.9]       Final Discharge Diagnoses:  Active Hospital Problems    Diagnosis     **Chest pain        Consults:   Consults       No orders found for last 30 day(s).            Procedures Performed:                 Pertinent Test Results:   Lab Results (most recent)       Procedure Component Value Units Date/Time    Respiratory Culture - Sputum, Cough [[id number]] Collected: 09/02/23 1612    Specimen: Sputum from Cough Updated: 09/03/23 0905     Respiratory Culture Scant growth (1+) The culture consists of normal respiratory steff. This is a preliminary report; final report to follow.     Gram Stain Many (4+) WBCs per low power field      Few (2+) Epithelial cells per low power field      Mixed bacterial steff    Basic Metabolic Panel [[id number]]  (Abnormal) Collected: 09/03/23 0815    Specimen: Blood Updated: 09/03/23 0850     Glucose 111 mg/dL      BUN 13 mg/dL      Creatinine 0.70 mg/dL      Sodium 135 mmol/L      Potassium 3.8 mmol/L      Chloride 100 mmol/L      CO2 22.0 mmol/L      Calcium 9.4 mg/dL      BUN/Creatinine Ratio 18.6     Anion Gap 13.0 mmol/L      eGFR 95.5 mL/min/1.73     Narrative:      GFR Normal >60  Chronic Kidney Disease <60  Kidney Failure <15      CBC & Differential [[id number]]  (Abnormal) Collected: 09/03/23 0815    Specimen: Blood Updated: 09/03/23 0831    Narrative:      The following orders were created for panel order CBC & Differential.  Procedure                               Abnormality         Status                     ---------                               -----------         ------                     CBC Auto Differential[[id number]]        Abnormal            Final result                  Please view results for these tests on the individual orders.    CBC Auto Differential [[id number]]  (Abnormal) Collected: 09/03/23 0815    Specimen: Blood Updated: 09/03/23 0831     WBC 11.49 10*3/mm3      RBC 4.95 10*6/mm3      Hemoglobin 12.4 g/dL      Hematocrit 39.3 %      MCV 79.4 fL      MCH 25.1 pg      MCHC 31.6 g/dL      RDW 13.9 %      RDW-SD 39.8 fl      MPV 9.3 fL      Platelets 383 10*3/mm3      Neutrophil % 82.6 %      Lymphocyte % 11.1 %      Monocyte % 4.7 %      Eosinophil % 0.0 %      Basophil % 0.2 %      Immature Grans % 1.4 %      Neutrophils, Absolute 9.50 10*3/mm3      Lymphocytes, Absolute 1.27 10*3/mm3      Monocytes, Absolute 0.54 10*3/mm3      Eosinophils, Absolute 0.00 10*3/mm3      Basophils, Absolute 0.02 10*3/mm3      Immature Grans, Absolute 0.16 10*3/mm3      nRBC 0.0 /100 WBC     Respiratory Panel PCR w/COVID-19(SARS-CoV-2) NIDA/LAUREANO/JARAD/PAD/COR/MAD/GASPER In-House, NP Swab in UTM/VTM, 3-4 HR TAT - Swab, Nasopharynx [[id number]]  (Normal) Collected: 09/02/23 1027    Specimen: Swab from Nasopharynx Updated: 09/02/23 1139     ADENOVIRUS, PCR Not Detected     Coronavirus 229E Not Detected     Coronavirus HKU1 Not Detected     Coronavirus NL63 Not Detected     Coronavirus OC43 Not Detected     COVID19 Not Detected     Human Metapneumovirus Not Detected     Human Rhinovirus/Enterovirus Not Detected     Influenza A PCR Not Detected     Influenza B PCR Not Detected     Parainfluenza Virus 1 Not Detected     Parainfluenza Virus 2 Not Detected     Parainfluenza Virus 3 Not Detected     Parainfluenza Virus 4 Not Detected     RSV, PCR Not Detected     Bordetella pertussis pcr Not Detected     Bordetella parapertussis PCR Not Detected     Chlamydophila pneumoniae PCR Not Detected     Mycoplasma pneumo by PCR Not Detected    Narrative:      In the setting of a positive respiratory panel with a viral infection PLUS a negative procalcitonin without other underlying concern for bacterial  infection, consider observing off antibiotics or discontinuation of antibiotics and continue supportive care. If the respiratory panel is positive for atypical bacterial infection (Bordetella pertussis, Chlamydophila pneumoniae, or Mycoplasma pneumoniae), consider antibiotic de-escalation to target atypical bacterial infection.    High Sensitivity Troponin T [[id number]]  (Normal) Collected: 09/02/23 0537    Specimen: Blood Updated: 09/02/23 0625     HS Troponin T 7 ng/L     Narrative:      High Sensitive Troponin T Reference Range:  <10.0 ng/L- Negative Female for AMI  <15.0 ng/L- Negative Male for AMI  >=10 - Abnormal Female indicating possible myocardial injury.  >=15 - Abnormal Male indicating possible myocardial injury.   Clinicians would have to utilize clinical acumen, EKG, Troponin, and serial changes to determine if it is an Acute Myocardial Infarction or myocardial injury due to an underlying chronic condition.         TSH [[id number]]  (Normal) Collected: 09/02/23 0537    Specimen: Blood Updated: 09/02/23 0625     TSH 1.910 uIU/mL     D-dimer, Quantitative [[id number]]  (Abnormal) Collected: 09/02/23 0537    Specimen: Blood Updated: 09/02/23 0621     D-Dimer, Quantitative 820 ng/mL (FEU)     Narrative:      According to the assay 's published package insert, a normal (<500 ng/mL (FEU)) D-dimer result in conjunction with a non-high clinical probability assessment, excludes deep vein thrombosis (DVT) and pulmonary embolism (PE) with high sensitivity.    D-dimer values increase with age and this can make VTE exclusion of an older population difficult. To address this, the [employer], based on best available evidence and recent guidelines, recommends that clinicians use age-adjusted D-dimer thresholds in patients greater than 50 years of age with: a) a low probability of PE who do not meet all Pulmonary Embolism Rule Out Criteria, or b) in those with intermediate probability of  "PE.   The formula for an age-adjusted D-dimer cut-off is \"age*10\".  For example, a 60 year old patient would have an age-adjusted cut-off of 600 ng/mL (FEU) and an 80 year old 800 ng/mL (FEU).      Magnesium [[id number]]  (Normal) Collected: 09/02/23 0537    Specimen: Blood Updated: 09/02/23 0620     Magnesium 2.2 mg/dL     Lipid Panel [[id number]]  (Abnormal) Collected: 09/02/23 0537    Specimen: Blood Updated: 09/02/23 0620     Total Cholesterol 251 mg/dL      Triglycerides 155 mg/dL      HDL Cholesterol 38 mg/dL      LDL Cholesterol  184 mg/dL      VLDL Cholesterol 29 mg/dL      LDL/HDL Ratio 4.79    Narrative:      Cholesterol Reference Ranges  (U.S. Department of Health and Human Services ATP III Classifications)    Desirable          <200 mg/dL  Borderline High    200-239 mg/dL  High Risk          >240 mg/dL      Triglyceride Reference Ranges  (U.S. Department of Health and Human Services ATP III Classifications)    Normal           <150 mg/dL  Borderline High  150-199 mg/dL  High             200-499 mg/dL  Very High        >500 mg/dL    HDL Reference Ranges  (U.S. Department of Health and Human Services ATP III Classifications)    Low     <40 mg/dl (major risk factor for CHD)  High    >60 mg/dl ('negative' risk factor for CHD)        LDL Reference Ranges  (U.S. Department of Health and Human Services ATP III Classifications)    Optimal          <100 mg/dL  Near Optimal     100-129 mg/dL  Borderline High  130-159 mg/dL  High             160-189 mg/dL  Very High        >189 mg/dL    Basic Metabolic Panel [[id number]]  (Abnormal) Collected: 09/02/23 0537    Specimen: Blood Updated: 09/02/23 0620     Glucose 102 mg/dL      BUN 11 mg/dL      Creatinine 0.72 mg/dL      Sodium 137 mmol/L      Potassium 4.1 mmol/L      Chloride 101 mmol/L      CO2 25.0 mmol/L      Calcium 9.2 mg/dL      BUN/Creatinine Ratio 15.3     Anion Gap 11.0 mmol/L      eGFR 92.3 mL/min/1.73     Narrative:      GFR Normal >60  Chronic Kidney " Disease <60  Kidney Failure <15      Hemoglobin A1c [[id number]]  (Abnormal) Collected: 09/02/23 0537    Specimen: Blood Updated: 09/02/23 0616     Hemoglobin A1C 6.00 %     Narrative:      Hemoglobin A1C Ranges:    Increased Risk for Diabetes  5.7% to 6.4%  Diabetes                     >= 6.5%  Diabetic Goal                < 7.0%    CBC (No Diff) [[id number]]  (Abnormal) Collected: 09/02/23 0537    Specimen: Blood Updated: 09/02/23 0613     WBC 13.25 10*3/mm3      RBC 4.36 10*6/mm3      Hemoglobin 11.5 g/dL      Hematocrit 34.4 %      MCV 78.9 fL      MCH 26.4 pg      MCHC 33.4 g/dL      RDW 13.8 %      RDW-SD 39.5 fl      MPV 9.4 fL      Platelets 307 10*3/mm3     Extra Tubes [[id number]] Collected: 09/02/23 0106    Specimen: Blood from Arm, Right Updated: 09/02/23 0515    Narrative:      The following orders were created for panel order Extra Tubes.  Procedure                               Abnormality         Status                     ---------                               -----------         ------                     Gold Top - SST[[id number]]                                   Final result               Law Top[[id number]]                                         Final result               Light Blue Top[[id number]]                                   Final result                 Please view results for these tests on the individual orders.    Gray Top [[id number]] Collected: 09/02/23 0106    Specimen: Blood from Arm, Right Updated: 09/02/23 0515     Extra Tube Hold for add-ons.     Comment: Auto resulted.       High Sensitivity Troponin T 2Hr [[id number]]  (Normal) Collected: 09/02/23 0313    Specimen: Blood Updated: 09/02/23 0409     HS Troponin T 7 ng/L      Troponin T Delta 0 ng/L     Narrative:      High Sensitive Troponin T Reference Range:  <10.0 ng/L- Negative Female for AMI  <15.0 ng/L- Negative Male for AMI  >=10 - Abnormal Female indicating possible myocardial injury.  >=15 - Abnormal Male indicating  possible myocardial injury.   Clinicians would have to utilize clinical acumen, EKG, Troponin, and serial changes to determine if it is an Acute Myocardial Infarction or myocardial injury due to an underlying chronic condition.         Gold Top - SST [[id number]] Collected: 09/02/23 0106    Specimen: Blood from Arm, Right Updated: 09/02/23 0216     Extra Tube Hold for add-ons.     Comment: Auto resulted.       Light Blue Top [[id number]] Collected: 09/02/23 0106    Specimen: Blood from Arm, Right Updated: 09/02/23 0216     Extra Tube Hold for add-ons.     Comment: Auto resulted       Comprehensive Metabolic Panel [[id number]]  (Abnormal) Collected: 09/02/23 0106    Specimen: Blood from Arm, Right Updated: 09/02/23 0155     Glucose 112 mg/dL      BUN 12 mg/dL      Creatinine 0.78 mg/dL      Sodium 135 mmol/L      Potassium 3.8 mmol/L      Chloride 98 mmol/L      CO2 26.0 mmol/L      Calcium 9.3 mg/dL      Total Protein 8.3 g/dL      Albumin 3.8 g/dL      ALT (SGPT) 19 U/L      AST (SGOT) 25 U/L      Alkaline Phosphatase 129 U/L      Total Bilirubin <0.2 mg/dL      Globulin 4.5 gm/dL      A/G Ratio 0.8 g/dL      BUN/Creatinine Ratio 15.4     Anion Gap 11.0 mmol/L      eGFR 83.9 mL/min/1.73     Narrative:      GFR Normal >60  Chronic Kidney Disease <60  Kidney Failure <15      Magnesium [[id number]]  (Normal) Collected: 09/02/23 0106    Specimen: Blood from Arm, Right Updated: 09/02/23 0155     Magnesium 2.1 mg/dL     Lipase [[id number]]  (Normal) Collected: 09/02/23 0106    Specimen: Blood from Arm, Right Updated: 09/02/23 0155     Lipase 33 U/L     High Sensitivity Troponin T [[id number]]  (Normal) Collected: 09/02/23 0106    Specimen: Blood from Arm, Right Updated: 09/02/23 0155     HS Troponin T 7 ng/L     Narrative:      High Sensitive Troponin T Reference Range:  <10.0 ng/L- Negative Female for AMI  <15.0 ng/L- Negative Male for AMI  >=10 - Abnormal Female indicating possible myocardial injury.  >=15 - Abnormal Male  indicating possible myocardial injury.   Clinicians would have to utilize clinical acumen, EKG, Troponin, and serial changes to determine if it is an Acute Myocardial Infarction or myocardial injury due to an underlying chronic condition.         CK [[id number]]  (Normal) Collected: 09/02/23 0106    Specimen: Blood from Arm, Right Updated: 09/02/23 0155     Creatine Kinase 29 U/L     BNP [[id number]]  (Normal) Collected: 09/02/23 0106    Specimen: Blood from Arm, Right Updated: 09/02/23 0153     proBNP 98.7 pg/mL     Narrative:      Among patients with dyspnea, NT-proBNP is highly sensitive for the detection of acute congestive heart failure. In addition NT-proBNP of <300 pg/ml effectively rules out acute congestive heart failure with 99% negative predictive value.      CBC & Differential [[id number]]  (Abnormal) Collected: 09/02/23 0106    Specimen: Blood from Arm, Right Updated: 09/02/23 0118    Narrative:      The following orders were created for panel order CBC & Differential.  Procedure                               Abnormality         Status                     ---------                               -----------         ------                     CBC Auto Differential[[id number]]        Abnormal            Final result                 Please view results for these tests on the individual orders.    CBC Auto Differential [[id number]]  (Abnormal) Collected: 09/02/23 0106    Specimen: Blood from Arm, Right Updated: 09/02/23 0118     WBC 14.75 10*3/mm3      RBC 4.78 10*6/mm3      Hemoglobin 12.2 g/dL      Hematocrit 39.3 %      MCV 82.2 fL      MCH 25.5 pg      MCHC 31.0 g/dL      RDW 14.2 %      RDW-SD 42.5 fl      MPV 9.5 fL      Platelets 349 10*3/mm3      Neutrophil % 81.9 %      Lymphocyte % 10.0 %      Monocyte % 6.2 %      Eosinophil % 0.9 %      Basophil % 0.3 %      Immature Grans % 0.7 %      Neutrophils, Absolute 12.06 10*3/mm3      Lymphocytes, Absolute 1.48 10*3/mm3      Monocytes, Absolute 0.91 10*3/mm3       Eosinophils, Absolute 0.14 10*3/mm3      Basophils, Absolute 0.05 10*3/mm3      Immature Grans, Absolute 0.11 10*3/mm3      nRBC 0.0 /100 WBC           Imaging Results (Most Recent)       Procedure Component Value Units Date/Time    CT Angiogram Chest [[id number]] Collected: 09/02/23 0747     Updated: 09/02/23 0752    Narrative:      CTA CHEST W CONTRAST    HISTORY: Elevated D-dimer level with chest pain, rule out pulmonary embolus    COMPARISON: 7/20/2023.    Automated exposure control was also utilized to decrease patient radiation dose.    TECHNIQUE: Axial images of the chest were obtained after the administration of  intravenous contrast using pulmonary angiogram protocol. Additionally, 3D and  multiplanar reformatted images were provided.    FINDINGS:  Pulmonary arteries: There is adequate enhancement of the pulmonary arteries to  evaluate for central and segmental pulmonary emboli. There are no filling  defects within the main, lobar, segmental or visualized subsegmental pulmonary  arteries. The pulmonary arteries are within normal limits for size.    Aorta and great vessels: The aorta is well opacified and demonstrates no  dissection or aneurysm. The brachiocephalic vessels are normal in appearance.    Lungs: Severe bibasilar chronic interstitial lung disease with evidence of  honeycombing and pulmonary fibrosis in the lung bases. This is slightly worse  than the prior study indicating acute infiltration superimposed on severe  chronic disease.    Mediastinum and lymph nodes: No enlarged mediastinal, hilar, or axillary lymph  nodes are present.    Skeletal and soft tissues: The osseous structures of the thorax and surrounding  soft tissues demonstrate no acute process.    Upper abdomen: The imaged portion of the upper abdomen demonstrates no acute  process.        Impression:      1. No PE, no aortic dissection.  2. Slight acute infiltration in the lung bases superimposed on severe chronic  interstitial  lung disease with pulmonary fibrosis. The lung bases are slightly  worse than 7/20/2023.    XR Chest 1 View [[id number]] Collected: 09/02/23 0232     Updated: 09/02/23 0237    Narrative:      XR CHEST 1 VIEW    HISTORY: chest pain    COMPARISON: 10/28/2022 and CT from 7/20/2023.    FINDINGS:  Frontal view of the chest was obtained.    There is chronic interstitial thickening similar to prior..This is most  prominent near the bases.  Cardiac silhouette is within normal limits. There is  no significant pleural effusion or pneumothorax.    The osseous structures and surrounding soft tissues demonstrate no acute  abnormality.    Upper abdomen is unremarkable.        Impression:      1. Stable basilar fibrosis without definite superimposed acute infiltrate.                Chief Complaint on Day of Discharge: Denies any complaints    Hospital Course:  The patient is a 68 y.o. female with severe ILD, currently being worked up at [facility], GERD, esophageal stricture, IBS, chronic tobacco use who presented to [facility] with complaints of left-sided chest and shoulder pain that was described as sharp in nature. She had no further episodes after admission.  EKG showed no acute ischemic changes.  CTA chest ruled out PE.  Did show severe chronic fibrotic changes and bibasilar infiltrates.  She did have an elevated white count of 13,000.  She was started on antibiotics and steroids.  Echocardiogram showed preserved EF, elevated RVSP suggestive of pulmonary hypertension.  Patient feels well and is eager to be discharged home.  Her troponin was negative x2 and she has had no further complaints.  She will be discharged home on oral antibiotics, short course prednisone and to be referred to cardiology as an outpatient.  She is stable for discharge home.    Condition on Discharge: Stable    Physical Exam on Discharge:  /74 (BP Location: Left arm, Patient Position: Sitting)   Pulse 86   Temp 97.3 °F (36.3 °C)  "(Temporal)   Resp 18   Ht 166.4 cm (65.5\")   Wt 77.3 kg (170 lb 6.4 oz)   SpO2 90%   BMI 27.92 kg/m²   Physical Exam  Vitals and nursing note reviewed.   Constitutional:       General: She is not in acute distress.     Appearance: She is not ill-appearing.   HENT:      Head: Normocephalic and atraumatic.      Mouth/Throat:      Pharynx: Oropharynx is clear.   Eyes:      Conjunctiva/sclera: Conjunctivae normal.   Cardiovascular:      Rate and Rhythm: Normal rate and regular rhythm.      Pulses: Normal pulses.      Heart sounds: Normal heart sounds.   Pulmonary:      Effort: Pulmonary effort is normal.      Comments: Bibasilar velcro crackles  Abdominal:      General: Bowel sounds are normal. There is no distension.      Tenderness: There is no abdominal tenderness.   Musculoskeletal:         General: Normal range of motion.      Cervical back: Neck supple.   Skin:     General: Skin is warm and dry.      Capillary Refill: Capillary refill takes less than 2 seconds.   Neurological:      Mental Status: She is alert and oriented to person, place, and time. Mental status is at baseline.   Psychiatric:         Mood and Affect: Mood normal.         Behavior: Behavior normal.         Discharge Disposition:  Home or Self Care    Discharge Medications:     Discharge Medications        New Medications        Instructions Start Date   aspirin 81 MG chewable tablet   81 mg, Oral, Daily   Start Date: September 4, 2023     azithromycin 500 MG tablet  Commonly known as: Zithromax   500 mg, Oral, Daily      cefdinir 300 MG capsule  Commonly known as: OMNICEF   300 mg, Oral, 2 Times Daily      predniSONE 20 MG tablet  Commonly known as: DELTASONE   40 mg, Oral, Daily With Breakfast   Start Date: September 4, 2023            Changes to Medications        Instructions Start Date   ipratropium-albuterol 0.5-2.5 mg/3 ml nebulizer  Commonly known as: DUO-NEB  What changed: additional instructions   3 mL, Nebulization, Every 4 Hours " PRN             Continue These Medications        Instructions Start Date   acetaminophen-codeine 300-30 MG per tablet  Commonly known as: TYLENOL #3   1 tablet, Oral, 2 Times Daily PRN      albuterol sulfate  (90 Base) MCG/ACT inhaler  Commonly known as: Ventolin HFA   2 puffs, Inhalation, Every 4 Hours PRN      albuterol (2.5 MG/3ML) 0.083% nebulizer solution  Commonly known as: PROVENTIL   2.5 mg, Nebulization, 4 Times Daily PRN, Mix with atrovent      benzonatate 200 MG capsule  Commonly known as: TESSALON   200 mg, Oral, 3 Times Daily PRN      Biotin 1 MG capsule   1 capsule, Oral, Daily      Breztri Aerosphere 160-9-4.8 MCG/ACT aerosol inhaler  Generic drug: Budeson-Glycopyrrol-Formoterol   2 puffs, Inhalation, 2 Times Daily      Calcium-Magnesium-Vitamin D - MG-MG-UNIT tablet sustained-release 24 hour   2 tablets, Oral, Daily      Cranberry 500 MG tablet   500 mg, Oral, 2 times daily      dicyclomine 20 MG tablet  Commonly known as: BENTYL   20 mg, Oral, Every 6 Hours      famotidine 20 MG tablet  Commonly known as: PEPCID   20 mg, Oral, Daily      fexofenadine 180 MG tablet  Commonly known as: Allegra Allergy   180 mg, Oral, Daily      FISH OIL OMEGA-3 PO   Oral      fluticasone 50 MCG/ACT nasal spray  Commonly known as: FLONASE   2 sprays into each nostril daily      ipratropium 0.02 % nebulizer solution  Commonly known as: ATROVENT   0.5 mg, Nebulization, 4 Times Daily PRN, Mix with albuterol      montelukast 10 MG tablet  Commonly known as: SINGULAIR   10 mg, Oral, Daily      olopatadine 0.6 % solution nasal solution  Commonly known as: PATANASE   2 sprays, Each Nare, 2 Times Daily      pantoprazole 40 MG EC tablet  Commonly known as: PROTONIX   40 mg, Oral, Daily      PROLIA SC   Subcutaneous, Every 6 Months, Due in October 2023      vitamin D3 125 MCG (5000 UT) capsule capsule   5,000 Units, Oral, Daily, Takes 2 daily             Stop These Medications      ezetimibe 10 MG  tablet  Commonly known as: ZETIA              Discharge Diet:   Diet Instructions       Diet: Cardiac Diets; Low Sodium (2g); Regular Texture (IDDSI 7); Thin (IDDSI 0)      Discharge Diet: Cardiac Diets    Cardiac Diet: Low Sodium (2g)    Texture: Regular Texture (IDDSI 7)    Fluid Consistency: Thin (IDDSI 0)            Activity at Discharge:   Activity Instructions       Activity as Tolerated              Discharge Care Plan/Instructions: Take medications as prescribed. Keep follow up appointments. Return to ER if return of or worsening of symptoms.     Follow-up Appointments:   Future Appointments   Date Time Provider Department Center   9/6/2023 11:30 AM Maria Esther Meléndez APRN [facility]   9/6/2023  1:45 PM Amber Chadwick APRN [facility]   10/10/2023 10:30 AM [facility] OP INFU CHAIR 14 [facility] OPI [facility]   10/26/2023 10:00 AM Evan Rondon MD [facility]   10/26/2023  1:15 PM Mariah De Jesus DO [facility] PULM [facility]   3/4/2024 10:00 AM [facility] OSCR [facility]       Test Results Pending at Discharge:   Pending Labs       Order Current Status    Respiratory Culture - Sputum, Cough Preliminary result                  Time: 35 mins

## 2023-09-04 LAB
BACTERIA SPEC RESP CULT: NORMAL
GRAM STN SPEC: NORMAL

## 2023-09-06 ENCOUNTER — OFFICE VISIT (OUTPATIENT)
Dept: GASTROENTEROLOGY | Facility: CLINIC | Age: 68
End: 2023-09-06
Payer: MEDICARE

## 2023-09-06 ENCOUNTER — OFFICE VISIT (OUTPATIENT)
Dept: SLEEP MEDICINE | Facility: HOSPITAL | Age: 68
End: 2023-09-06
Payer: MEDICARE

## 2023-09-06 VITALS
DIASTOLIC BLOOD PRESSURE: 80 MMHG | SYSTOLIC BLOOD PRESSURE: 161 MMHG | WEIGHT: 173.8 LBS | HEIGHT: 66 IN | HEART RATE: 99 BPM | BODY MASS INDEX: 27.93 KG/M2

## 2023-09-06 VITALS
HEIGHT: 66 IN | WEIGHT: 172 LBS | DIASTOLIC BLOOD PRESSURE: 74 MMHG | BODY MASS INDEX: 27.64 KG/M2 | HEART RATE: 81 BPM | OXYGEN SATURATION: 95 % | SYSTOLIC BLOOD PRESSURE: 139 MMHG

## 2023-09-06 DIAGNOSIS — K21.9 GASTROESOPHAGEAL REFLUX DISEASE WITHOUT ESOPHAGITIS: ICD-10-CM

## 2023-09-06 DIAGNOSIS — R13.19 OTHER DYSPHAGIA: Primary | ICD-10-CM

## 2023-09-06 DIAGNOSIS — Z87.19 HISTORY OF ESOPHAGEAL STRICTURE: ICD-10-CM

## 2023-09-06 DIAGNOSIS — G47.33 OSA (OBSTRUCTIVE SLEEP APNEA): Primary | ICD-10-CM

## 2023-09-06 PROBLEM — Z91.038 HISTORY OF INSECT STING ALLERGY: Status: ACTIVE | Noted: 2023-09-05

## 2023-09-06 PROBLEM — J45.909 MODERATE ASTHMA: Status: ACTIVE | Noted: 2023-08-22

## 2023-09-06 PROBLEM — B02.9 SHINGLES OUTBREAK: Status: ACTIVE | Noted: 2023-08-22

## 2023-09-06 PROBLEM — J30.2 SEASONAL ALLERGIES: Status: ACTIVE | Noted: 2023-08-22

## 2023-09-06 PROBLEM — J84.10 PULMONARY FIBROSIS: Status: ACTIVE | Noted: 2023-09-05

## 2023-09-06 PROBLEM — J84.9 INTERSTITIAL LUNG DISEASE: Status: ACTIVE | Noted: 2023-08-22

## 2023-09-06 PROBLEM — E78.5 HYPERLIPIDEMIA: Status: ACTIVE | Noted: 2023-08-22

## 2023-09-06 PROCEDURE — 99214 OFFICE O/P EST MOD 30 MIN: CPT | Performed by: NURSE PRACTITIONER

## 2023-09-06 PROCEDURE — 1159F MED LIST DOCD IN RCRD: CPT | Performed by: NURSE PRACTITIONER

## 2023-09-06 PROCEDURE — 99213 OFFICE O/P EST LOW 20 MIN: CPT | Performed by: NURSE PRACTITIONER

## 2023-09-06 PROCEDURE — 1160F RVW MEDS BY RX/DR IN RCRD: CPT | Performed by: NURSE PRACTITIONER

## 2023-09-06 RX ORDER — DEXTROSE AND SODIUM CHLORIDE 5; .45 G/100ML; G/100ML
30 INJECTION, SOLUTION INTRAVENOUS CONTINUOUS PRN
OUTPATIENT
Start: 2023-09-06

## 2023-09-06 NOTE — H&P (VIEW-ONLY)
Chief Complaint   Patient presents with    Abdominal Pain       Subjective    Genna Garcia is a 68 y.o. female. she is here today for follow-up.                                                                  Assessment & Plan                                     1. Other dysphagia    2. Gastroesophageal reflux disease without esophagitis    3. History of esophageal stricture    Plan; schedule patient for EGD with dilation if indicated due to recurrent dysphagia history of esophageal stricture     Follow-up: Return in about 4 weeks (around 10/4/2023) for Recheck, After test.     HPI  68-year-old female presents to discuss dysphagia.  She was recently hospitalized due to pneumonia and will finish up course of antibiotic and steroid tomorrow morning.  Has concurrent medical history of interstitial lung disease, GERD, irritable bowel syndrome recurrent esophageal stricture.  Denies any abdominal pain states over the last few weeks she has had intermittent episode of chest pain which was evaluated in the hospital.  Is being evaluated [facility] due to interstitial lung disease.  Had good relief of dysphagia after last EGD with dilation was completed April 20, 2023    Review of Systems  Review of Systems   Constitutional:  Negative for activity change, appetite change, chills, diaphoresis, fatigue, fever and unexpected weight change.   HENT:  Positive for trouble swallowing (with any intake feels like it stops mid clavicle area). Negative for sore throat.    Respiratory:  Positive for cough (frequent throat clearing) and shortness of breath.    Gastrointestinal:  Positive for abdominal pain (upper stomach cramps). Negative for abdominal distention, anal bleeding, blood in stool, constipation, diarrhea, nausea, rectal pain and vomiting.   Musculoskeletal:  Negative for arthralgias.   Skin:   "Negative for pallor.   Neurological:  Negative for light-headedness.     /80 (BP Location: Right arm)   Pulse 99   Ht 166.4 cm (65.5\")   Wt 78.8 kg (173 lb 12.8 oz)   BMI 28.48 kg/m²     Objective      Physical Exam  Constitutional:       General: She is not in acute distress.     Appearance: Normal appearance. She is normal weight. She is not ill-appearing or toxic-appearing.   HENT:      Head: Normocephalic and atraumatic.   Pulmonary:      Effort: Pulmonary effort is normal. No respiratory distress.   Abdominal:      General: Abdomen is flat. Bowel sounds are normal. There is no distension.      Palpations: Abdomen is soft. There is no mass.      Tenderness: There is no abdominal tenderness.   Neurological:      Mental Status: She is alert.             The following portions of the patient's history were reviewed and updated as appropriate:   Past Medical History:   Diagnosis Date    Anesthesia complication     states she \"stopped breathing during her last procedure\"    Arthritis     Asthma     Bronchiectasis     Elevated cholesterol     Endometriosis     Falls     GERD (gastroesophageal reflux disease)     Osteoporosis     Pulmonary fibrosis     Scoliosis of thoracolumbar spine     Seasonal rhinitis     Sinusitis      Past Surgical History:   Procedure Laterality Date    APPENDECTOMY      COLONOSCOPY  07/05/2022    Per Dr. Evan Chavez M.D., [facility], [place], [place].    COLONOSCOPY N/A 06/30/2020    Procedure: COLONOSCOPY;  Surgeon: Evan Chavez MD;  Location: [facility] ENDOSCOPY;  Service: Gastroenterology;  Laterality: N/A;    ENDOSCOPY N/A 10/16/2019    Procedure: ESOPHAGOGASTRODUODENOSCOPY;  Surgeon: Evan Chavez MD;  Location: [facility] ENDOSCOPY;  Service: Gastroenterology    ENDOSCOPY N/A 06/30/2020    Procedure: ESOPHAGOGASTRODUODENOSCOPY;  Surgeon: Evan Chavez MD;  Location: [facility] ENDOSCOPY;  Service: Gastroenterology;  Laterality: N/A;  savory dilation 48-54    " ENDOSCOPY N/A 2021    Procedure: ESOPHAGOGASTRODUODENOSCOPY;  Surgeon: Evan Rondon MD;  Location: [facility] ENDOSCOPY;  Service: Gastroenterology;  Laterality: N/A;  48-54 f eso dil. blue    ENDOSCOPY N/A 2022    Procedure: ESOPHAGOGASTRODUODENOSCOPY;  Surgeon: Evan Rondon MD;  Location: [facility] ENDOSCOPY;  Service: Gastroenterology;  Laterality: N/A;    ENDOSCOPY N/A 2023    Procedure: ESOPHAGOGASTRODUODENOSCOPY;  Surgeon: Evan Rondon MD;  Location: [facility] ENDOSCOPY;  Service: Gastroenterology;  Laterality: N/A;    FOREARM SURGERY      HYSTERECTOMY  1984    total    SALPINGO OOPHORECTOMY      UPPER GASTROINTESTINAL ENDOSCOPY  10/16/2019    UPPER GASTROINTESTINAL ENDOSCOPY  2020    UPPER GASTROINTESTINAL ENDOSCOPY  2021    WRIST FRACTURE SURGERY Right      Family History   Problem Relation Age of Onset    Heart failure Mother     Thyroid disease Mother     Ulcerative colitis Father      OB History    No obstetric history on file.       Allergies   Allergen Reactions    Sulfa Antibiotics Other (See Comments)     Low platelets    Levaquin [Levofloxacin] Diarrhea and Nausea And Vomiting    Augmentin [Amoxicillin-Pot Clavulanate] Diarrhea    Cefuroxime Diarrhea     Social History     Socioeconomic History    Marital status:    Tobacco Use    Smoking status: Former     Packs/day: 2.50     Years: 20.00     Pack years: 50.00     Types: Cigarettes     Start date:      Quit date:      Years since quittin.6     Passive exposure: Past    Smokeless tobacco: Never   Vaping Use    Vaping Use: Never used   Substance and Sexual Activity    Alcohol use: Not Currently     Comment: none in 40 years    Drug use: Never    Sexual activity: Defer     Current Medications:  Prior to Admission medications    Medication Sig Start Date End Date Taking? Authorizing Provider   acetaminophen-codeine (TYLENOL #3) 300-30 MG per tablet Take 1 tablet by mouth 2 (Two) Times a Day As Needed  for Moderate Pain . 7/21/22  Yes Neena Kee APRN   albuterol sulfate HFA (Ventolin HFA) 108 (90 Base) MCG/ACT inhaler Inhale 2 puffs Every 4 (Four) Hours As Needed for Wheezing or Shortness of Air. 12/15/22  Yes Mariah De Jesus, DO   aspirin 81 MG chewable tablet Chew 1 tablet Daily for 30 days. 9/4/23 10/4/23 Yes Katie Bunn APRN   benzonatate (TESSALON) 200 MG capsule Take 1 capsule by mouth 3 (Three) Times a Day As Needed for Cough.   Yes Roosevelt Bryant MD   Biotin 1 MG capsule Take 1 capsule by mouth Daily.   Yes Roosevelt Bryant MD   Budeson-Glycopyrrol-Formoterol (Breztri Aerosphere) 160-9-4.8 MCG/ACT aerosol inhaler Inhale 2 puffs 2 (Two) Times a Day. 12/15/22  Yes Mariah De Jesus, DO   Calcium-Magnesium-Vitamin D - MG-MG-UNIT tablet sustained-release 24 hour Take 2 tablets by mouth Daily.   Yes ProviderRoosevelt MD   cefdinir (OMNICEF) 300 MG capsule Take 1 capsule by mouth 2 (Two) Times a Day for 4 days. 9/3/23 9/7/23 Yes Katie Bunn APRN   Cranberry 500 MG tablet Take 500 mg by mouth 2 (two) times a day.   Yes Roosevelt Bryant MD   Denosumab (PROLIA SC) Inject  under the skin into the appropriate area as directed Every 6 (Six) Months. Due in October 2023   Yes Roosevelt Bryant MD   dicyclomine (BENTYL) 20 MG tablet Take 1 tablet by mouth Every 6 (Six) Hours. 4/27/23  Yes Evan Rondon MD   famotidine (PEPCID) 20 MG tablet TAKE 1 TABLET BY MOUTH DAILY. 8/29/23  Yes Evan Rondon MD   fexofenadine (Allegra Allergy) 180 MG tablet Take 1 tablet by mouth Daily. 12/29/22  Yes Mariah De Jesus DO   fluticasone (FLONASE) 50 MCG/ACT nasal spray 2 sprays into each nostril daily 7/1/21  Yes Mariah De Jesus, DO   ipratropium (ATROVENT) 0.02 % nebulizer solution Take 2.5 mL by nebulization 4 (Four) Times a Day As Needed for Wheezing or Shortness of Air. Mix with albuterol 3/28/23  Yes Charlene Sheffield APRN   ipratropium-albuterol (DUO-NEB) 0.5-2.5 mg/3  ml nebulizer Take 3 mL by nebulization Every 4 (Four) Hours As Needed for Wheezing or Shortness of Air. 12/15/22  Yes Mariah De Jesus DO   montelukast (SINGULAIR) 10 MG tablet Take 1 tablet by mouth Daily. 3/1/22  Yes Roosevelt Bryant MD   olopatadine (PATANASE) 0.6 % solution nasal solution 2 sprays by Each Nare route 2 (Two) Times a Day. 7/1/21  Yes Mariah De Jesus,    Omega-3 Fatty Acids (FISH OIL OMEGA-3 PO) Take  by mouth.   Yes ProviderRoosevelt MD   pantoprazole (PROTONIX) 40 MG EC tablet Take 1 tablet by mouth Daily. 4/27/23  Yes Evan Rondon MD   predniSONE (DELTASONE) 20 MG tablet Take 2 tablets by mouth Daily With Breakfast for 4 doses. 9/4/23 9/8/23 Yes Katie Bunn APRN   vitamin D3 125 MCG (5000 UT) capsule capsule Take 1 capsule by mouth Daily. Takes 2 daily   Yes ProviderRoosevelt MD   albuterol (PROVENTIL) (2.5 MG/3ML) 0.083% nebulizer solution Take 2.5 mg by nebulization 4 (Four) Times a Day As Needed for Wheezing. Mix with atrovent 8/1/23 9/6/23  Charlene Sheffield APRN   azithromycin (Zithromax) 500 MG tablet Take 1 tablet by mouth Daily for 2 days. 9/3/23 9/6/23  Katie Bunn APRN     Orders placed during this encounter include:  Orders Placed This Encounter   Procedures    Obtain Informed Consent     Standing Status:   Future     Order Specific Question:   Informed Consent Given For     Answer:   ESOPHAGOGASTRODUODENOSCOPY possible dilation     ESOPHAGOGASTRODUODENOSCOPY possible dilation (N/A)  No orders of the defined types were placed in this encounter.        Review and/or summary of lab tests, radiology, procedures, medications. Review and summary of old records and obtaining of history. The risks and benefits of my recommendations, as well as other treatment options were discussed . Any questions/concerned were answered. Patient voiced understanding and agreement.          This document has been electronically signed by MARCELLA Mae on September 6,  2023 14:18 CDT                                               Results for orders placed or performed during the hospital encounter of 09/02/23   Gray Top   Result Value Ref Range    Extra Tube Hold for add-ons.    Gold Top - SST   Result Value Ref Range    Extra Tube Hold for add-ons.    High Sensitivity Troponin T 2Hr    Specimen: Blood   Result Value Ref Range    HS Troponin T 7 <10 ng/L    Troponin T Delta 0 >=-4 - <+4 ng/L   Respiratory Panel PCR w/COVID-19(SARS-CoV-2) [facility]O/JARAD/PAD/[facility]/M[facility] In-House, NP Swab in UTM/VTM, 3-4 HR TAT - Swab, Nasopharynx    Specimen: Nasopharynx; Swab   Result Value Ref Range    ADENOVIRUS, PCR Not Detected Not Detected    Coronavirus 229E Not Detected Not Detected    Coronavirus HKU1 Not Detected Not Detected    Coronavirus NL63 Not Detected Not Detected    Coronavirus OC43 Not Detected Not Detected    COVID19 Not Detected Not Detected - Ref. Range    Human Metapneumovirus Not Detected Not Detected    Human Rhinovirus/Enterovirus Not Detected Not Detected    Influenza A PCR Not Detected Not Detected    Influenza B PCR Not Detected Not Detected    Parainfluenza Virus 1 Not Detected Not Detected    Parainfluenza Virus 2 Not Detected Not Detected    Parainfluenza Virus 3 Not Detected Not Detected    Parainfluenza Virus 4 Not Detected Not Detected    RSV, PCR Not Detected Not Detected    Bordetella pertussis pcr Not Detected Not Detected    Bordetella parapertussis PCR Not Detected Not Detected    Chlamydophila pneumoniae PCR Not Detected Not Detected    Mycoplasma pneumo by PCR Not Detected Not Detected   Respiratory Culture - Sputum, Cough    Specimen: Cough; Sputum   Result Value Ref Range    Respiratory Culture       Scant growth (1+) Normal respiratory steff. No S. aureus or Pseudomonas aeruginosa detected. Final report.    Gram Stain Many (4+) WBCs per low power field     Gram Stain Few (2+) Epithelial cells per low power field     Gram Stain Mixed bacterial steff    CBC  Auto Differential    Specimen: Blood   Result Value Ref Range    WBC 11.49 (H) 3.40 - 10.80 10*3/mm3    RBC 4.95 3.77 - 5.28 10*6/mm3    Hemoglobin 12.4 12.0 - 15.9 g/dL    Hematocrit 39.3 34.0 - 46.6 %    MCV 79.4 79.0 - 97.0 fL    MCH 25.1 (L) 26.6 - 33.0 pg    MCHC 31.6 31.5 - 35.7 g/dL    RDW 13.9 12.3 - 15.4 %    RDW-SD 39.8 37.0 - 54.0 fl    MPV 9.3 6.0 - 12.0 fL    Platelets 383 140 - 450 10*3/mm3    Neutrophil % 82.6 (H) 42.7 - 76.0 %    Lymphocyte % 11.1 (L) 19.6 - 45.3 %    Monocyte % 4.7 (L) 5.0 - 12.0 %    Eosinophil % 0.0 (L) 0.3 - 6.2 %    Basophil % 0.2 0.0 - 1.5 %    Immature Grans % 1.4 (H) 0.0 - 0.5 %    Neutrophils, Absolute 9.50 (H) 1.70 - 7.00 10*3/mm3    Lymphocytes, Absolute 1.27 0.70 - 3.10 10*3/mm3    Monocytes, Absolute 0.54 0.10 - 0.90 10*3/mm3    Eosinophils, Absolute 0.00 0.00 - 0.40 10*3/mm3    Basophils, Absolute 0.02 0.00 - 0.20 10*3/mm3    Immature Grans, Absolute 0.16 (H) 0.00 - 0.05 10*3/mm3    nRBC 0.0 0.0 - 0.2 /100 WBC   CBC Auto Differential    Specimen: Arm, Right; Blood   Result Value Ref Range    WBC 14.75 (H) 3.40 - 10.80 10*3/mm3    RBC 4.78 3.77 - 5.28 10*6/mm3    Hemoglobin 12.2 12.0 - 15.9 g/dL    Hematocrit 39.3 34.0 - 46.6 %    MCV 82.2 79.0 - 97.0 fL    MCH 25.5 (L) 26.6 - 33.0 pg    MCHC 31.0 (L) 31.5 - 35.7 g/dL    RDW 14.2 12.3 - 15.4 %    RDW-SD 42.5 37.0 - 54.0 fl    MPV 9.5 6.0 - 12.0 fL    Platelets 349 140 - 450 10*3/mm3    Neutrophil % 81.9 (H) 42.7 - 76.0 %    Lymphocyte % 10.0 (L) 19.6 - 45.3 %    Monocyte % 6.2 5.0 - 12.0 %    Eosinophil % 0.9 0.3 - 6.2 %    Basophil % 0.3 0.0 - 1.5 %    Immature Grans % 0.7 (H) 0.0 - 0.5 %    Neutrophils, Absolute 12.06 (H) 1.70 - 7.00 10*3/mm3    Lymphocytes, Absolute 1.48 0.70 - 3.10 10*3/mm3    Monocytes, Absolute 0.91 (H) 0.10 - 0.90 10*3/mm3    Eosinophils, Absolute 0.14 0.00 - 0.40 10*3/mm3    Basophils, Absolute 0.05 0.00 - 0.20 10*3/mm3    Immature Grans, Absolute 0.11 (H) 0.00 - 0.05 10*3/mm3    nRBC 0.0 0.0 -  0.2 /100 WBC   Light Blue Top   Result Value Ref Range    Extra Tube Hold for add-ons.    High Sensitivity Troponin T    Specimen: Blood   Result Value Ref Range    HS Troponin T 7 <10 ng/L   High Sensitivity Troponin T    Specimen: Arm, Right; Blood   Result Value Ref Range    HS Troponin T 7 <10 ng/L   D-dimer, Quantitative    Specimen: Blood   Result Value Ref Range    D-Dimer, Quantitative 820 (H) 0 - 660 ng/mL (FEU)   CBC (No Diff)    Specimen: Blood   Result Value Ref Range    WBC 13.25 (H) 3.40 - 10.80 10*3/mm3    RBC 4.36 3.77 - 5.28 10*6/mm3    Hemoglobin 11.5 (L) 12.0 - 15.9 g/dL    Hematocrit 34.4 34.0 - 46.6 %    MCV 78.9 (L) 79.0 - 97.0 fL    MCH 26.4 (L) 26.6 - 33.0 pg    MCHC 33.4 31.5 - 35.7 g/dL    RDW 13.8 12.3 - 15.4 %    RDW-SD 39.5 37.0 - 54.0 fl    MPV 9.4 6.0 - 12.0 fL    Platelets 307 140 - 450 10*3/mm3   TSH    Specimen: Blood   Result Value Ref Range    TSH 1.910 0.270 - 4.200 uIU/mL   BNP    Specimen: Arm, Right; Blood   Result Value Ref Range    proBNP 98.7 0.0 - 900.0 pg/mL   Magnesium    Specimen: Blood   Result Value Ref Range    Magnesium 2.2 1.6 - 2.4 mg/dL   Magnesium    Specimen: Arm, Right; Blood   Result Value Ref Range    Magnesium 2.1 1.6 - 2.4 mg/dL   Lipase    Specimen: Arm, Right; Blood   Result Value Ref Range    Lipase 33 13 - 60 U/L   Hemoglobin A1c    Specimen: Blood   Result Value Ref Range    Hemoglobin A1C 6.00 (H) 4.80 - 5.60 %   CK    Specimen: Arm, Right; Blood   Result Value Ref Range    Creatine Kinase 29 20 - 180 U/L   Lipid Panel    Specimen: Blood   Result Value Ref Range    Total Cholesterol 251 (H) 0 - 200 mg/dL    Triglycerides 155 (H) 0 - 150 mg/dL    HDL Cholesterol 38 (L) 40 - 60 mg/dL    LDL Cholesterol  184 (H) 0 - 100 mg/dL    VLDL Cholesterol 29 5 - 40 mg/dL    LDL/HDL Ratio 4.79      *Note: Due to a large number of results and/or encounters for the requested time period, some results have not been displayed. A complete set of results can be found in  Results Review.

## 2023-09-06 NOTE — PROGRESS NOTES
Sleep Clinic Follow Up    Date: 9/6/2023  Primary Care Provider: Skylar Ferrer APRN    Last office visit: 03/2/2023 (I reviewed this note)    CC: Follow up: DORA started on CPAP, 6 month      Interim History:  Since the last visit:    1) moderate DORA -  Genna Garcia has remained compliant with CPAP. She denies mask and machine issues, dry mouth, headaches, or pressures intolerance. She denies abnormal dreams, sleep paralysis, nasal congestion, URI sx.    Doing well likes using PAP machine.     Sleep Testing:    HST on 08/18/2022, AHI of 15   CPAP titration on 09/28/2022, recommended 8 cm H2O   Currently on 6-7 cm H2O    PAP Data:    Time frame: 04/1/2023-09/5/2023   Compliance: 97 %  Average use on days used: 8hrs 4 min  Percent of days with usage greater than or equal to 4 hours: 96%  PAP range: 6-7 cm H2O  Average 90% pressure: 7 cmH2O  Leak: 17 L/ minutes  Average AHI: 0.9 events/hr  Machine: resmed with modem   Mask type: Full face mask  DME: [facility]     Bed time: 2300  Sleep latency: 15 minutes  Number of times awakens during the night: 1  Wake time: 0800  Estimated total sleep time at night: 9 hours  Caffeine intake: 2 cups of coffee, 2 cups of tea, and 1 sodas per day  Alcohol intake: 0 drinks per week  Nap time: denies    Sleepiness with Driving: denies       Perry Sleepiness Scale Score: 4    How likely are you to doze off or fall asleep in the following situation, in contrast to feeling just tired?     Use the following scale to choose the most appropriate number for each situation:    0 = would never doze  1 = slight chance of dozing   2 = moderate chance of dozing   3 = high chance of dozing    It is important that you answer each question as best you can.      Situation       Chance of Dozing (0-3)    Sitting and reading            ___1____    Watching TV          ___1____    Sitting, inactive in a public place (e.g. a theatre or meeting)   ___0____    As a passenger in a car for an  "hour without break    ___0____    Lying down to rest in the afternoon, when circumstances permit  ___2____    Sitting and talking to someone      ___0____    Sitting quietly after a lunch without alcohol     ___0____    In a car, while stopped for a few minutes in traffic    ___0____         PMHx, FH, SH reviewed and pertinent changes are: hospitalized with pneumonia       REVIEW OF SYSTEMS:   Negative for chest pain, SOA, fever, chills, cough, N/V/D, abdominal pain.    Smoking:none, former            Exam:  Vitals:    09/06/23 1128   BP: 139/74   Pulse: 81   SpO2: 95%           09/06/23  1128   Weight: 78 kg (172 lb)     Body mass index is 28.18 kg/m².  BMI is >= 25 and <30. (Overweight) The following options were offered after discussion;: nutrition counseling/recommendations       Gen:                No distress, conversant, pleasant, appears stated age, alert, oriented  Eyes:               Anicteric sclera, moist conjunctiva, no lid lag                           EOMI   Lungs:             normal effort, non-labored breathing                          Clear to auscultation bilaterally          CV:                  Normal S1/S2, no murmur                          no lower extremity edema                 Psych:             Appropriate affect  Neuro:             CN 2-12 appear intact    Past Medical History:   Diagnosis Date    Anesthesia complication     states she \"stopped breathing during her last procedure\"    Arthritis     Asthma     Bronchiectasis     Elevated cholesterol     Endometriosis     Falls     GERD (gastroesophageal reflux disease)     Osteoporosis     Pulmonary fibrosis     Scoliosis of thoracolumbar spine     Seasonal rhinitis     Sinusitis        Current Outpatient Medications:     acetaminophen-codeine (TYLENOL #3) 300-30 MG per tablet, Take 1 tablet by mouth 2 (Two) Times a Day As Needed for Moderate Pain ., Disp: 60 tablet, Rfl: 0    albuterol sulfate HFA (Ventolin HFA) 108 (90 Base) MCG/ACT " inhaler, Inhale 2 puffs Every 4 (Four) Hours As Needed for Wheezing or Shortness of Air., Disp: 18 g, Rfl: 2    aspirin 81 MG chewable tablet, Chew 1 tablet Daily for 30 days., Disp: 30 tablet, Rfl: 0    benzonatate (TESSALON) 200 MG capsule, Take 1 capsule by mouth 3 (Three) Times a Day As Needed for Cough., Disp: , Rfl:     Biotin 1 MG capsule, Take 1 capsule by mouth Daily., Disp: , Rfl:     Budeson-Glycopyrrol-Formoterol (Breztri Aerosphere) 160-9-4.8 MCG/ACT aerosol inhaler, Inhale 2 puffs 2 (Two) Times a Day., Disp: 1 each, Rfl: 11    Calcium-Magnesium-Vitamin D - MG-MG-UNIT tablet sustained-release 24 hour, Take 2 tablets by mouth Daily., Disp: , Rfl:     cefdinir (OMNICEF) 300 MG capsule, Take 1 capsule by mouth 2 (Two) Times a Day for 4 days., Disp: 8 capsule, Rfl: 0    Cranberry 500 MG tablet, Take 500 mg by mouth 2 (two) times a day., Disp: , Rfl:     Denosumab (PROLIA SC), Inject  under the skin into the appropriate area as directed Every 6 (Six) Months. Due in October 2023, Disp: , Rfl:     dicyclomine (BENTYL) 20 MG tablet, Take 1 tablet by mouth Every 6 (Six) Hours., Disp: 120 tablet, Rfl: 3    famotidine (PEPCID) 20 MG tablet, TAKE 1 TABLET BY MOUTH DAILY., Disp: 30 tablet, Rfl: 3    fexofenadine (Allegra Allergy) 180 MG tablet, Take 1 tablet by mouth Daily., Disp: 30 tablet, Rfl: 5    fluticasone (FLONASE) 50 MCG/ACT nasal spray, 2 sprays into each nostril daily, Disp: 16 mL, Rfl: 5    ipratropium (ATROVENT) 0.02 % nebulizer solution, Take 2.5 mL by nebulization 4 (Four) Times a Day As Needed for Wheezing or Shortness of Air. Mix with albuterol, Disp: 180 mL, Rfl: 3    ipratropium-albuterol (DUO-NEB) 0.5-2.5 mg/3 ml nebulizer, Take 3 mL by nebulization Every 4 (Four) Hours As Needed for Wheezing or Shortness of Air., Disp: 360 mL, Rfl: 5    montelukast (SINGULAIR) 10 MG tablet, Take 1 tablet by mouth Daily., Disp: , Rfl:     olopatadine (PATANASE) 0.6 % solution nasal solution, 2 sprays  by Each Nare route 2 (Two) Times a Day., Disp: 30 g, Rfl: 5    Omega-3 Fatty Acids (FISH OIL OMEGA-3 PO), Take  by mouth., Disp: , Rfl:     pantoprazole (PROTONIX) 40 MG EC tablet, Take 1 tablet by mouth Daily., Disp: 30 tablet, Rfl: 5    predniSONE (DELTASONE) 20 MG tablet, Take 2 tablets by mouth Daily With Breakfast for 4 doses., Disp: 8 tablet, Rfl: 0    vitamin D3 125 MCG (5000 UT) capsule capsule, Take 1 capsule by mouth Daily. Takes 2 daily, Disp: , Rfl:   WBC   Date Value Ref Range Status   09/03/2023 11.49 (H) 3.40 - 10.80 10*3/mm3 Final     RBC   Date Value Ref Range Status   09/03/2023 4.95 3.77 - 5.28 10*6/mm3 Final     Hemoglobin   Date Value Ref Range Status   09/03/2023 12.4 12.0 - 15.9 g/dL Final     Hematocrit   Date Value Ref Range Status   09/03/2023 39.3 34.0 - 46.6 % Final     MCV   Date Value Ref Range Status   09/03/2023 79.4 79.0 - 97.0 fL Final     MCH   Date Value Ref Range Status   09/03/2023 25.1 (L) 26.6 - 33.0 pg Final     MCHC   Date Value Ref Range Status   09/03/2023 31.6 31.5 - 35.7 g/dL Final     RDW   Date Value Ref Range Status   09/03/2023 13.9 12.3 - 15.4 % Final     RDW-SD   Date Value Ref Range Status   09/03/2023 39.8 37.0 - 54.0 fl Final     MPV   Date Value Ref Range Status   09/03/2023 9.3 6.0 - 12.0 fL Final     Platelets   Date Value Ref Range Status   09/03/2023 383 140 - 450 10*3/mm3 Final     Neutrophil %   Date Value Ref Range Status   09/03/2023 82.6 (H) 42.7 - 76.0 % Final     Lymphocyte %   Date Value Ref Range Status   09/03/2023 11.1 (L) 19.6 - 45.3 % Final     Monocyte %   Date Value Ref Range Status   09/03/2023 4.7 (L) 5.0 - 12.0 % Final     Eosinophil %   Date Value Ref Range Status   09/03/2023 0.0 (L) 0.3 - 6.2 % Final     Basophil %   Date Value Ref Range Status   09/03/2023 0.2 0.0 - 1.5 % Final     Immature Grans %   Date Value Ref Range Status   09/03/2023 1.4 (H) 0.0 - 0.5 % Final     Neutrophils, Absolute   Date Value Ref Range Status   09/03/2023  9.50 (H) 1.70 - 7.00 10*3/mm3 Final     Lymphocytes, Absolute   Date Value Ref Range Status   09/03/2023 1.27 0.70 - 3.10 10*3/mm3 Final     Monocytes, Absolute   Date Value Ref Range Status   09/03/2023 0.54 0.10 - 0.90 10*3/mm3 Final     Eosinophils, Absolute   Date Value Ref Range Status   09/03/2023 0.00 0.00 - 0.40 10*3/mm3 Final     Basophils, Absolute   Date Value Ref Range Status   09/03/2023 0.02 0.00 - 0.20 10*3/mm3 Final     Immature Grans, Absolute   Date Value Ref Range Status   09/03/2023 0.16 (H) 0.00 - 0.05 10*3/mm3 Final     nRBC   Date Value Ref Range Status   09/03/2023 0.0 0.0 - 0.2 /100 WBC Final       Lab Results   Component Value Date    GLUCOSE 111 (H) 09/03/2023    BUN 13 09/03/2023    CREATININE 0.70 09/03/2023    EGFR 95.5 09/03/2023    BCR 18.6 09/03/2023    K 3.8 09/03/2023    CO2 22.0 09/03/2023    CALCIUM 9.4 09/03/2023    ALBUMIN 3.8 09/02/2023    BILITOT <0.2 09/02/2023    AST 25 09/02/2023    ALT 19 09/02/2023         Assessment and Plan:    Obstructive sleep apnea- Established, stable   Compliant with PAP therapy- compliance report reviewed with patient   Continue PAP as prescribed  Script for PAP supplies  Pertinent labs reviewed   Drowsy driving tips- do not drive if feeling sleepy   Return to clinic in 12 months with compliance report unless change in symptoms in interim period          Educated on PAP management, maintenance, and compliance.           This document has been electronically signed by MARCELLA Jeronimo on September 6, 2023 11:34 CDT            CC: Skylar Ferrer APRN Muhlethaler, Maresa E, *

## 2023-09-06 NOTE — PROGRESS NOTES
Chief Complaint   Patient presents with    Abdominal Pain       Subjective    Genna Garcia is a 68 y.o. female. she is here today for follow-up.                                                                  Assessment & Plan                                     1. Other dysphagia    2. Gastroesophageal reflux disease without esophagitis    3. History of esophageal stricture    Plan; schedule patient for EGD with dilation if indicated due to recurrent dysphagia history of esophageal stricture     Follow-up: Return in about 4 weeks (around 10/4/2023) for Recheck, After test.     HPI  68-year-old female presents to discuss dysphagia.  She was recently hospitalized due to pneumonia and will finish up course of antibiotic and steroid tomorrow morning.  Has concurrent medical history of interstitial lung disease, GERD, irritable bowel syndrome recurrent esophageal stricture.  Denies any abdominal pain states over the last few weeks she has had intermittent episode of chest pain which was evaluated in the hospital.  Is being evaluated [place] due to interstitial lung disease.  Had good relief of dysphagia after last EGD with dilation was completed April 20, 2023    Review of Systems  Review of Systems   Constitutional:  Negative for activity change, appetite change, chills, diaphoresis, fatigue, fever and unexpected weight change.   HENT:  Positive for trouble swallowing (with any intake feels like it stops mid clavicle area). Negative for sore throat.    Respiratory:  Positive for cough (frequent throat clearing) and shortness of breath.    Gastrointestinal:  Positive for abdominal pain (upper stomach cramps). Negative for abdominal distention, anal bleeding, blood in stool, constipation, diarrhea, nausea, rectal pain and vomiting.   Musculoskeletal:  Negative for arthralgias.   Skin:   "Negative for pallor.   Neurological:  Negative for light-headedness.     /80 (BP Location: Right arm)   Pulse 99   Ht 166.4 cm (65.5\")   Wt 78.8 kg (173 lb 12.8 oz)   BMI 28.48 kg/m²     Objective      Physical Exam  Constitutional:       General: She is not in acute distress.     Appearance: Normal appearance. She is normal weight. She is not ill-appearing or toxic-appearing.   HENT:      Head: Normocephalic and atraumatic.   Pulmonary:      Effort: Pulmonary effort is normal. No respiratory distress.   Abdominal:      General: Abdomen is flat. Bowel sounds are normal. There is no distension.      Palpations: Abdomen is soft. There is no mass.      Tenderness: There is no abdominal tenderness.   Neurological:      Mental Status: She is alert.             The following portions of the patient's history were reviewed and updated as appropriate:   Past Medical History:   Diagnosis Date    Anesthesia complication     states she \"stopped breathing during her last procedure\"    Arthritis     Asthma     Bronchiectasis     Elevated cholesterol     Endometriosis     Falls     GERD (gastroesophageal reflux disease)     Osteoporosis     Pulmonary fibrosis     Scoliosis of thoracolumbar spine     Seasonal rhinitis     Sinusitis      Past Surgical History:   Procedure Laterality Date    APPENDECTOMY      COLONOSCOPY  07/05/2022    Per Dr. Evan Chavez M.D., [facility], [place], [place].    COLONOSCOPY N/A 06/30/2020    Procedure: COLONOSCOPY;  Surgeon: Evan Chavez MD;  Location: [facility] ENDOSCOPY;  Service: Gastroenterology;  Laterality: N/A;    ENDOSCOPY N/A 10/16/2019    Procedure: ESOPHAGOGASTRODUODENOSCOPY;  Surgeon: Evan Chavez MD;  Location: [facility] ENDOSCOPY;  Service: Gastroenterology    ENDOSCOPY N/A 06/30/2020    Procedure: ESOPHAGOGASTRODUODENOSCOPY;  Surgeon: Evan Chavez MD;  Location: [facility] ENDOSCOPY;  Service: Gastroenterology;  Laterality: N/A;  savory dilation 48-54    " ENDOSCOPY N/A 2021    Procedure: ESOPHAGOGASTRODUODENOSCOPY;  Surgeon: Evan Rondon MD;  Location: [facility] ENDOSCOPY;  Service: Gastroenterology;  Laterality: N/A;  48-54 f eso dil. blue    ENDOSCOPY N/A 2022    Procedure: ESOPHAGOGASTRODUODENOSCOPY;  Surgeon: Evan Rondon MD;  Location: [facility] ENDOSCOPY;  Service: Gastroenterology;  Laterality: N/A;    ENDOSCOPY N/A 2023    Procedure: ESOPHAGOGASTRODUODENOSCOPY;  Surgeon: Evan Rondon MD;  Location: [facility] ENDOSCOPY;  Service: Gastroenterology;  Laterality: N/A;    FOREARM SURGERY      HYSTERECTOMY  1984    total    SALPINGO OOPHORECTOMY      UPPER GASTROINTESTINAL ENDOSCOPY  10/16/2019    UPPER GASTROINTESTINAL ENDOSCOPY  2020    UPPER GASTROINTESTINAL ENDOSCOPY  2021    WRIST FRACTURE SURGERY Right      Family History   Problem Relation Age of Onset    Heart failure Mother     Thyroid disease Mother     Ulcerative colitis Father      OB History    No obstetric history on file.       Allergies   Allergen Reactions    Sulfa Antibiotics Other (See Comments)     Low platelets    Levaquin [Levofloxacin] Diarrhea and Nausea And Vomiting    Augmentin [Amoxicillin-Pot Clavulanate] Diarrhea    Cefuroxime Diarrhea     Social History     Socioeconomic History    Marital status:    Tobacco Use    Smoking status: Former     Packs/day: 2.50     Years: 20.00     Pack years: 50.00     Types: Cigarettes     Start date:      Quit date:      Years since quittin.6     Passive exposure: Past    Smokeless tobacco: Never   Vaping Use    Vaping Use: Never used   Substance and Sexual Activity    Alcohol use: Not Currently     Comment: none in 40 years    Drug use: Never    Sexual activity: Defer     Current Medications:  Prior to Admission medications    Medication Sig Start Date End Date Taking? Authorizing Provider   acetaminophen-codeine (TYLENOL #3) 300-30 MG per tablet Take 1 tablet by mouth 2 (Two) Times a Day As Needed  for Moderate Pain . 7/21/22  Yes Neena Kee APRN   albuterol sulfate HFA (Ventolin HFA) 108 (90 Base) MCG/ACT inhaler Inhale 2 puffs Every 4 (Four) Hours As Needed for Wheezing or Shortness of Air. 12/15/22  Yes Mariah De Jesus, DO   aspirin 81 MG chewable tablet Chew 1 tablet Daily for 30 days. 9/4/23 10/4/23 Yes Katie Bunn APRN   benzonatate (TESSALON) 200 MG capsule Take 1 capsule by mouth 3 (Three) Times a Day As Needed for Cough.   Yes Roosevelt Bryant MD   Biotin 1 MG capsule Take 1 capsule by mouth Daily.   Yes Roosevelt Bryant MD   Budeson-Glycopyrrol-Formoterol (Breztri Aerosphere) 160-9-4.8 MCG/ACT aerosol inhaler Inhale 2 puffs 2 (Two) Times a Day. 12/15/22  Yes Mariah De Jesus, DO   Calcium-Magnesium-Vitamin D - MG-MG-UNIT tablet sustained-release 24 hour Take 2 tablets by mouth Daily.   Yes ProviderRoosevelt MD   cefdinir (OMNICEF) 300 MG capsule Take 1 capsule by mouth 2 (Two) Times a Day for 4 days. 9/3/23 9/7/23 Yes Katie Bunn APRN   Cranberry 500 MG tablet Take 500 mg by mouth 2 (two) times a day.   Yes Roosevelt Bryant MD   Denosumab (PROLIA SC) Inject  under the skin into the appropriate area as directed Every 6 (Six) Months. Due in October 2023   Yes Roosevelt Bryant MD   dicyclomine (BENTYL) 20 MG tablet Take 1 tablet by mouth Every 6 (Six) Hours. 4/27/23  Yes Evan Rondon MD   famotidine (PEPCID) 20 MG tablet TAKE 1 TABLET BY MOUTH DAILY. 8/29/23  Yes Evan Rondon MD   fexofenadine (Allegra Allergy) 180 MG tablet Take 1 tablet by mouth Daily. 12/29/22  Yes Mariah De Jesus DO   fluticasone (FLONASE) 50 MCG/ACT nasal spray 2 sprays into each nostril daily 7/1/21  Yes Mariah De Jesus, DO   ipratropium (ATROVENT) 0.02 % nebulizer solution Take 2.5 mL by nebulization 4 (Four) Times a Day As Needed for Wheezing or Shortness of Air. Mix with albuterol 3/28/23  Yes Charlene Sheffield APRN   ipratropium-albuterol (DUO-NEB) 0.5-2.5 mg/3  ml nebulizer Take 3 mL by nebulization Every 4 (Four) Hours As Needed for Wheezing or Shortness of Air. 12/15/22  Yes Mariah De Jesus DO   montelukast (SINGULAIR) 10 MG tablet Take 1 tablet by mouth Daily. 3/1/22  Yes Roosevelt Bryant MD   olopatadine (PATANASE) 0.6 % solution nasal solution 2 sprays by Each Nare route 2 (Two) Times a Day. 7/1/21  Yes Mariah De Jesus,    Omega-3 Fatty Acids (FISH OIL OMEGA-3 PO) Take  by mouth.   Yes ProviderRoosevelt MD   pantoprazole (PROTONIX) 40 MG EC tablet Take 1 tablet by mouth Daily. 4/27/23  Yes Evan Rondon MD   predniSONE (DELTASONE) 20 MG tablet Take 2 tablets by mouth Daily With Breakfast for 4 doses. 9/4/23 9/8/23 Yes Katie Bunn APRN   vitamin D3 125 MCG (5000 UT) capsule capsule Take 1 capsule by mouth Daily. Takes 2 daily   Yes ProviderRoosevelt MD   albuterol (PROVENTIL) (2.5 MG/3ML) 0.083% nebulizer solution Take 2.5 mg by nebulization 4 (Four) Times a Day As Needed for Wheezing. Mix with atrovent 8/1/23 9/6/23  Charlene Sheffield APRN   azithromycin (Zithromax) 500 MG tablet Take 1 tablet by mouth Daily for 2 days. 9/3/23 9/6/23  Katie Bunn APRN     Orders placed during this encounter include:  Orders Placed This Encounter   Procedures    Obtain Informed Consent     Standing Status:   Future     Order Specific Question:   Informed Consent Given For     Answer:   ESOPHAGOGASTRODUODENOSCOPY possible dilation     ESOPHAGOGASTRODUODENOSCOPY possible dilation (N/A)  No orders of the defined types were placed in this encounter.        Review and/or summary of lab tests, radiology, procedures, medications. Review and summary of old records and obtaining of history. The risks and benefits of my recommendations, as well as other treatment options were discussed . Any questions/concerned were answered. Patient voiced understanding and agreement.          This document has been electronically signed by MARCELLA Mae on September 6,  2023 14:18 CDT                                               Results for orders placed or performed during the hospital encounter of 09/02/23   Gray Top   Result Value Ref Range    Extra Tube Hold for add-ons.    Gold Top - SST   Result Value Ref Range    Extra Tube Hold for add-ons.    High Sensitivity Troponin T 2Hr    Specimen: Blood   Result Value Ref Range    HS Troponin T 7 <10 ng/L    Troponin T Delta 0 >=-4 - <+4 ng/L   Respiratory Panel PCR w/COVID-19(SARS-CoV-2) NIDA/LAUREANO/JARAD/PAD/COR/MAD/GASPER In-House, NP Swab in UTM/VTM, 3-4 HR TAT - Swab, Nasopharynx    Specimen: Nasopharynx; Swab   Result Value Ref Range    ADENOVIRUS, PCR Not Detected Not Detected    Coronavirus 229E Not Detected Not Detected    Coronavirus HKU1 Not Detected Not Detected    Coronavirus NL63 Not Detected Not Detected    Coronavirus OC43 Not Detected Not Detected    COVID19 Not Detected Not Detected - Ref. Range    Human Metapneumovirus Not Detected Not Detected    Human Rhinovirus/Enterovirus Not Detected Not Detected    Influenza A PCR Not Detected Not Detected    Influenza B PCR Not Detected Not Detected    Parainfluenza Virus 1 Not Detected Not Detected    Parainfluenza Virus 2 Not Detected Not Detected    Parainfluenza Virus 3 Not Detected Not Detected    Parainfluenza Virus 4 Not Detected Not Detected    RSV, PCR Not Detected Not Detected    Bordetella pertussis pcr Not Detected Not Detected    Bordetella parapertussis PCR Not Detected Not Detected    Chlamydophila pneumoniae PCR Not Detected Not Detected    Mycoplasma pneumo by PCR Not Detected Not Detected   Respiratory Culture - Sputum, Cough    Specimen: Cough; Sputum   Result Value Ref Range    Respiratory Culture       Scant growth (1+) Normal respiratory steff. No S. aureus or Pseudomonas aeruginosa detected. Final report.    Gram Stain Many (4+) WBCs per low power field     Gram Stain Few (2+) Epithelial cells per low power field     Gram Stain Mixed bacterial steff    CBC  Auto Differential    Specimen: Blood   Result Value Ref Range    WBC 11.49 (H) 3.40 - 10.80 10*3/mm3    RBC 4.95 3.77 - 5.28 10*6/mm3    Hemoglobin 12.4 12.0 - 15.9 g/dL    Hematocrit 39.3 34.0 - 46.6 %    MCV 79.4 79.0 - 97.0 fL    MCH 25.1 (L) 26.6 - 33.0 pg    MCHC 31.6 31.5 - 35.7 g/dL    RDW 13.9 12.3 - 15.4 %    RDW-SD 39.8 37.0 - 54.0 fl    MPV 9.3 6.0 - 12.0 fL    Platelets 383 140 - 450 10*3/mm3    Neutrophil % 82.6 (H) 42.7 - 76.0 %    Lymphocyte % 11.1 (L) 19.6 - 45.3 %    Monocyte % 4.7 (L) 5.0 - 12.0 %    Eosinophil % 0.0 (L) 0.3 - 6.2 %    Basophil % 0.2 0.0 - 1.5 %    Immature Grans % 1.4 (H) 0.0 - 0.5 %    Neutrophils, Absolute 9.50 (H) 1.70 - 7.00 10*3/mm3    Lymphocytes, Absolute 1.27 0.70 - 3.10 10*3/mm3    Monocytes, Absolute 0.54 0.10 - 0.90 10*3/mm3    Eosinophils, Absolute 0.00 0.00 - 0.40 10*3/mm3    Basophils, Absolute 0.02 0.00 - 0.20 10*3/mm3    Immature Grans, Absolute 0.16 (H) 0.00 - 0.05 10*3/mm3    nRBC 0.0 0.0 - 0.2 /100 WBC   CBC Auto Differential    Specimen: Arm, Right; Blood   Result Value Ref Range    WBC 14.75 (H) 3.40 - 10.80 10*3/mm3    RBC 4.78 3.77 - 5.28 10*6/mm3    Hemoglobin 12.2 12.0 - 15.9 g/dL    Hematocrit 39.3 34.0 - 46.6 %    MCV 82.2 79.0 - 97.0 fL    MCH 25.5 (L) 26.6 - 33.0 pg    MCHC 31.0 (L) 31.5 - 35.7 g/dL    RDW 14.2 12.3 - 15.4 %    RDW-SD 42.5 37.0 - 54.0 fl    MPV 9.5 6.0 - 12.0 fL    Platelets 349 140 - 450 10*3/mm3    Neutrophil % 81.9 (H) 42.7 - 76.0 %    Lymphocyte % 10.0 (L) 19.6 - 45.3 %    Monocyte % 6.2 5.0 - 12.0 %    Eosinophil % 0.9 0.3 - 6.2 %    Basophil % 0.3 0.0 - 1.5 %    Immature Grans % 0.7 (H) 0.0 - 0.5 %    Neutrophils, Absolute 12.06 (H) 1.70 - 7.00 10*3/mm3    Lymphocytes, Absolute 1.48 0.70 - 3.10 10*3/mm3    Monocytes, Absolute 0.91 (H) 0.10 - 0.90 10*3/mm3    Eosinophils, Absolute 0.14 0.00 - 0.40 10*3/mm3    Basophils, Absolute 0.05 0.00 - 0.20 10*3/mm3    Immature Grans, Absolute 0.11 (H) 0.00 - 0.05 10*3/mm3    nRBC 0.0 0.0 -  0.2 /100 WBC   Light Blue Top   Result Value Ref Range    Extra Tube Hold for add-ons.    High Sensitivity Troponin T    Specimen: Blood   Result Value Ref Range    HS Troponin T 7 <10 ng/L   High Sensitivity Troponin T    Specimen: Arm, Right; Blood   Result Value Ref Range    HS Troponin T 7 <10 ng/L   D-dimer, Quantitative    Specimen: Blood   Result Value Ref Range    D-Dimer, Quantitative 820 (H) 0 - 660 ng/mL (FEU)   CBC (No Diff)    Specimen: Blood   Result Value Ref Range    WBC 13.25 (H) 3.40 - 10.80 10*3/mm3    RBC 4.36 3.77 - 5.28 10*6/mm3    Hemoglobin 11.5 (L) 12.0 - 15.9 g/dL    Hematocrit 34.4 34.0 - 46.6 %    MCV 78.9 (L) 79.0 - 97.0 fL    MCH 26.4 (L) 26.6 - 33.0 pg    MCHC 33.4 31.5 - 35.7 g/dL    RDW 13.8 12.3 - 15.4 %    RDW-SD 39.5 37.0 - 54.0 fl    MPV 9.4 6.0 - 12.0 fL    Platelets 307 140 - 450 10*3/mm3   TSH    Specimen: Blood   Result Value Ref Range    TSH 1.910 0.270 - 4.200 uIU/mL   BNP    Specimen: Arm, Right; Blood   Result Value Ref Range    proBNP 98.7 0.0 - 900.0 pg/mL   Magnesium    Specimen: Blood   Result Value Ref Range    Magnesium 2.2 1.6 - 2.4 mg/dL   Magnesium    Specimen: Arm, Right; Blood   Result Value Ref Range    Magnesium 2.1 1.6 - 2.4 mg/dL   Lipase    Specimen: Arm, Right; Blood   Result Value Ref Range    Lipase 33 13 - 60 U/L   Hemoglobin A1c    Specimen: Blood   Result Value Ref Range    Hemoglobin A1C 6.00 (H) 4.80 - 5.60 %   CK    Specimen: Arm, Right; Blood   Result Value Ref Range    Creatine Kinase 29 20 - 180 U/L   Lipid Panel    Specimen: Blood   Result Value Ref Range    Total Cholesterol 251 (H) 0 - 200 mg/dL    Triglycerides 155 (H) 0 - 150 mg/dL    HDL Cholesterol 38 (L) 40 - 60 mg/dL    LDL Cholesterol  184 (H) 0 - 100 mg/dL    VLDL Cholesterol 29 5 - 40 mg/dL    LDL/HDL Ratio 4.79      *Note: Due to a large number of results and/or encounters for the requested time period, some results have not been displayed. A complete set of results can be found in  Results Review.

## 2023-09-18 ENCOUNTER — ANESTHESIA EVENT (OUTPATIENT)
Dept: GASTROENTEROLOGY | Facility: HOSPITAL | Age: 68
End: 2023-09-18
Payer: MEDICARE

## 2023-09-18 ENCOUNTER — HOSPITAL ENCOUNTER (OUTPATIENT)
Facility: HOSPITAL | Age: 68
Setting detail: HOSPITAL OUTPATIENT SURGERY
Discharge: HOME | End: 2023-09-18
Attending: INTERNAL MEDICINE | Admitting: INTERNAL MEDICINE
Payer: MEDICARE

## 2023-09-18 ENCOUNTER — ANESTHESIA (OUTPATIENT)
Dept: GASTROENTEROLOGY | Facility: HOSPITAL | Age: 68
End: 2023-09-18
Payer: MEDICARE

## 2023-09-18 VITALS
OXYGEN SATURATION: 96 % | RESPIRATION RATE: 18 BRPM | HEART RATE: 73 BPM | TEMPERATURE: 97.5 F | WEIGHT: 169 LBS | BODY MASS INDEX: 28.16 KG/M2 | DIASTOLIC BLOOD PRESSURE: 90 MMHG | SYSTOLIC BLOOD PRESSURE: 147 MMHG | HEIGHT: 65 IN

## 2023-09-18 DIAGNOSIS — R13.19 OTHER DYSPHAGIA: ICD-10-CM

## 2023-09-18 DIAGNOSIS — Z87.19 HISTORY OF ESOPHAGEAL STRICTURE: ICD-10-CM

## 2023-09-18 DIAGNOSIS — K21.9 GASTROESOPHAGEAL REFLUX DISEASE WITHOUT ESOPHAGITIS: ICD-10-CM

## 2023-09-18 PROCEDURE — 25010000002 PROPOFOL 10 MG/ML EMULSION: Performed by: NURSE ANESTHETIST, CERTIFIED REGISTERED

## 2023-09-18 PROCEDURE — C1769 GUIDE WIRE: HCPCS | Performed by: INTERNAL MEDICINE

## 2023-09-18 PROCEDURE — 43248 EGD GUIDE WIRE INSERTION: CPT | Performed by: INTERNAL MEDICINE

## 2023-09-18 RX ORDER — DEXTROSE AND SODIUM CHLORIDE 5; .45 G/100ML; G/100ML
30 INJECTION, SOLUTION INTRAVENOUS CONTINUOUS PRN
Status: DISCONTINUED | OUTPATIENT
Start: 2023-09-18 | End: 2023-09-18 | Stop reason: HOSPADM

## 2023-09-18 RX ORDER — LIDOCAINE HYDROCHLORIDE 20 MG/ML
INJECTION, SOLUTION EPIDURAL; INFILTRATION; INTRACAUDAL; PERINEURAL AS NEEDED
Status: DISCONTINUED | OUTPATIENT
Start: 2023-09-18 | End: 2023-09-18 | Stop reason: SURG

## 2023-09-18 RX ORDER — PROPOFOL 10 MG/ML
VIAL (ML) INTRAVENOUS AS NEEDED
Status: DISCONTINUED | OUTPATIENT
Start: 2023-09-18 | End: 2023-09-18 | Stop reason: SURG

## 2023-09-18 RX ADMIN — PROPOFOL 30 MG: 10 INJECTION, EMULSION INTRAVENOUS at 14:14

## 2023-09-18 RX ADMIN — DEXTROSE AND SODIUM CHLORIDE 30 ML/HR: 5; 450 INJECTION, SOLUTION INTRAVENOUS at 13:53

## 2023-09-18 RX ADMIN — PROPOFOL 80 MG: 10 INJECTION, EMULSION INTRAVENOUS at 14:12

## 2023-09-18 RX ADMIN — LIDOCAINE HYDROCHLORIDE 60 MG: 20 INJECTION, SOLUTION EPIDURAL; INFILTRATION; INTRACAUDAL; PERINEURAL at 14:12

## 2023-09-18 NOTE — ANESTHESIA POSTPROCEDURE EVALUATION
Patient: Genna Garcia    Procedure Summary       Date: 09/18/23 Room / Location: [facility] ENDOSCOPY 1 / [facility] ENDOSCOPY    Anesthesia Start: 1409 Anesthesia Stop: 1416    Procedure: ESOPHAGOGASTRODUODENOSCOPY possible dilation Diagnosis:       Other dysphagia      Gastroesophageal reflux disease without esophagitis      History of esophageal stricture      (Other dysphagia [R13.19])      (Gastroesophageal reflux disease without esophagitis [K21.9])      (History of esophageal stricture [Z87.19])    Surgeons: Evan Rondon MD Provider: Cindy Gray CRNA    Anesthesia Type: general ASA Status: 3            Anesthesia Type: general    Vitals  No vitals data found for the desired time range.          Post Anesthesia Care and Evaluation    Patient location during evaluation: bedside  Patient participation: complete - patient participated  Level of consciousness: sleepy but conscious  Pain score: 0  Pain management: adequate    Airway patency: patent  Anesthetic complications: No anesthetic complications  PONV Status: none  Cardiovascular status: acceptable and stable  Respiratory status: acceptable, room air and spontaneous ventilation  Hydration status: acceptable    Comments: BP:  160/98  HR:  80  SAT:  94  RR:  18  TEMP:  98

## 2023-09-25 RX ORDER — SUCRALFATE 1 G/1
1 TABLET ORAL 4 TIMES DAILY
Qty: 120 TABLET | Refills: 5 | Status: SHIPPED | OUTPATIENT
Start: 2023-09-25

## (undated) DEVICE — CANN SMPL SOFTECH BIFLO ETCO2 A/M 7FT

## (undated) DEVICE — MSK ENDO PORT O2 POM ELITE CURAPLEX A/

## (undated) DEVICE — CLEANGUIDE DISPOSABLE MARKED SPRING TIP GUIDEWIRE, 1.86 MM X 210 CM: Brand: CLEANGUIDE

## (undated) DEVICE — SINGLE-USE BIOPSY FORCEPS: Brand: RADIAL JAW 4

## (undated) DEVICE — BITEBLOCK ENDO W/STRAP 60F A/ LF DISP